# Patient Record
Sex: MALE | Race: WHITE | NOT HISPANIC OR LATINO | Employment: UNEMPLOYED | URBAN - METROPOLITAN AREA
[De-identification: names, ages, dates, MRNs, and addresses within clinical notes are randomized per-mention and may not be internally consistent; named-entity substitution may affect disease eponyms.]

---

## 2019-03-25 ENCOUNTER — APPOINTMENT (EMERGENCY)
Dept: CT IMAGING | Facility: HOSPITAL | Age: 49
End: 2019-03-25
Payer: OTHER GOVERNMENT

## 2019-03-25 ENCOUNTER — HOSPITAL ENCOUNTER (EMERGENCY)
Facility: HOSPITAL | Age: 49
Discharge: OTHER INSTITUTION | End: 2019-03-25
Attending: EMERGENCY MEDICINE | Admitting: EMERGENCY MEDICINE
Payer: OTHER GOVERNMENT

## 2019-03-25 ENCOUNTER — APPOINTMENT (EMERGENCY)
Dept: RADIOLOGY | Facility: HOSPITAL | Age: 49
End: 2019-03-25
Payer: OTHER GOVERNMENT

## 2019-03-25 ENCOUNTER — HOSPITAL ENCOUNTER (INPATIENT)
Facility: HOSPITAL | Age: 49
LOS: 4 days | Discharge: HOME | DRG: 640 | End: 2019-03-29
Attending: INTERNAL MEDICINE | Admitting: INTERNAL MEDICINE
Payer: OTHER GOVERNMENT

## 2019-03-25 VITALS
OXYGEN SATURATION: 95 % | WEIGHT: 200 LBS | SYSTOLIC BLOOD PRESSURE: 102 MMHG | BODY MASS INDEX: 25.67 KG/M2 | TEMPERATURE: 97.9 F | RESPIRATION RATE: 21 BRPM | DIASTOLIC BLOOD PRESSURE: 72 MMHG | HEART RATE: 127 BPM | HEIGHT: 74 IN

## 2019-03-25 DIAGNOSIS — E11.65 TYPE 2 DIABETES MELLITUS WITH HYPERGLYCEMIA, WITHOUT LONG-TERM CURRENT USE OF INSULIN (HCC): ICD-10-CM

## 2019-03-25 DIAGNOSIS — F10.239 ALCOHOL WITHDRAWAL (HCC): ICD-10-CM

## 2019-03-25 DIAGNOSIS — E86.0 DEHYDRATION: ICD-10-CM

## 2019-03-25 DIAGNOSIS — R11.2 NAUSEA & VOMITING: Primary | ICD-10-CM

## 2019-03-25 DIAGNOSIS — E11.10 DIABETIC KETOACIDOSIS WITHOUT COMA ASSOCIATED WITH TYPE 2 DIABETES MELLITUS (HCC): ICD-10-CM

## 2019-03-25 DIAGNOSIS — F10.10 ALCOHOL ABUSE: ICD-10-CM

## 2019-03-25 DIAGNOSIS — N17.9 ACUTE KIDNEY INJURY (HCC): ICD-10-CM

## 2019-03-25 DIAGNOSIS — E87.2 ALCOHOLIC KETOACIDOSIS: ICD-10-CM

## 2019-03-25 DIAGNOSIS — R73.9 HYPERGLYCEMIA: ICD-10-CM

## 2019-03-25 DIAGNOSIS — K92.0 GASTROINTESTINAL HEMORRHAGE WITH HEMATEMESIS: Primary | ICD-10-CM

## 2019-03-25 PROBLEM — E87.29 ALCOHOLIC KETOACIDOSIS: Status: ACTIVE | Noted: 2019-03-25

## 2019-03-25 LAB
ALBUMIN SERPL BCP-MCNC: 3.2 G/DL (ref 3.5–5.7)
ALP SERPL-CCNC: 329 U/L (ref 40–150)
ALT SERPL W P-5'-P-CCNC: 35 U/L (ref 7–52)
ANION GAP SERPL CALCULATED.3IONS-SCNC: 23 MMOL/L (ref 4–13)
ANION GAP SERPL CALCULATED.3IONS-SCNC: 24 MMOL/L (ref 4–13)
ANION GAP SERPL CALCULATED.3IONS-SCNC: 28 MMOL/L (ref 4–13)
APTT PPP: 43 SECONDS (ref 26–38)
ARTERIAL PATENCY WRIST A: YES
AST SERPL W P-5'-P-CCNC: 93 U/L (ref 13–39)
BASE EXCESS BLDA CALC-SCNC: -0.5 MMOL/L
BASOPHILS # BLD AUTO: 0.1 THOUSANDS/ΜL (ref 0–0.1)
BASOPHILS NFR BLD AUTO: 1 % (ref 0–2)
BETA-HYDROXYBUTYRATE: 2.53 MMOL/L (ref 0.02–0.27)
BILIRUB SERPL-MCNC: 1.6 MG/DL (ref 0.2–1)
BUN SERPL-MCNC: 6 MG/DL (ref 7–25)
BUN SERPL-MCNC: 6 MG/DL (ref 7–25)
BUN SERPL-MCNC: 7 MG/DL (ref 5–25)
CALCIUM SERPL-MCNC: 8.8 MG/DL (ref 8.3–10.1)
CALCIUM SERPL-MCNC: 8.9 MG/DL (ref 8.6–10.5)
CALCIUM SERPL-MCNC: 9.9 MG/DL (ref 8.6–10.5)
CHLORIDE SERPL-SCNC: 84 MMOL/L (ref 98–107)
CHLORIDE SERPL-SCNC: 88 MMOL/L (ref 98–107)
CHLORIDE SERPL-SCNC: 93 MMOL/L (ref 100–108)
CO2 SERPL-SCNC: 20 MMOL/L (ref 21–32)
CO2 SERPL-SCNC: 22 MMOL/L (ref 21–31)
CO2 SERPL-SCNC: 23 MMOL/L (ref 21–31)
CREAT SERPL-MCNC: 1.15 MG/DL (ref 0.7–1.3)
CREAT SERPL-MCNC: 1.3 MG/DL (ref 0.6–1.3)
CREAT SERPL-MCNC: 1.33 MG/DL (ref 0.7–1.3)
DACRYOCYTES BLD QL SMEAR: PRESENT
EOSINOPHIL # BLD AUTO: 0 THOUSAND/ΜL (ref 0–0.61)
EOSINOPHIL NFR BLD AUTO: 0 % (ref 0–5)
ERYTHROCYTE [DISTWIDTH] IN BLOOD BY AUTOMATED COUNT: 13.3 % (ref 11.5–14.5)
ERYTHROCYTE [DISTWIDTH] IN BLOOD BY AUTOMATED COUNT: 13.3 % (ref 11.6–15.1)
ETHANOL SERPL-MCNC: 74.4 MG/DL
GFR SERPL CREATININE-BSD FRML MDRD: 62 ML/MIN/1.73SQ M
GFR SERPL CREATININE-BSD FRML MDRD: 64 ML/MIN/1.73SQ M
GFR SERPL CREATININE-BSD FRML MDRD: 74 ML/MIN/1.73SQ M
GIANT PLATELETS BLD QL SMEAR: PRESENT
GLUCOSE SERPL-MCNC: 304 MG/DL (ref 65–99)
GLUCOSE SERPL-MCNC: 424 MG/DL (ref 65–99)
GLUCOSE SERPL-MCNC: 437 MG/DL (ref 65–140)
GLUCOSE SERPL-MCNC: 447 MG/DL (ref 65–140)
GLUCOSE SERPL-MCNC: 467 MG/DL (ref 65–140)
GLUCOSE SERPL-MCNC: 475 MG/DL (ref 65–140)
HCO3 BLDA-SCNC: 21.6 MMOL/L (ref 22–28)
HCT VFR BLD AUTO: 41.2 % (ref 36.5–49.3)
HCT VFR BLD AUTO: 45.3 % (ref 42–47)
HGB BLD-MCNC: 14.3 G/DL (ref 12–17)
HGB BLD-MCNC: 15.7 G/DL (ref 14–18)
INR PPP: 1.37 (ref 0.9–1.5)
LIPASE SERPL-CCNC: 12 U/L (ref 11–82)
LYMPHOCYTES # BLD AUTO: 1.2 THOUSANDS/ΜL (ref 0.6–4.47)
LYMPHOCYTES NFR BLD AUTO: 22 % (ref 21–51)
MACROCYTES BLD QL AUTO: PRESENT
MAGNESIUM SERPL-MCNC: 1.6 MG/DL (ref 1.9–2.7)
MAGNESIUM SERPL-MCNC: 2 MG/DL (ref 1.6–2.6)
MCH RBC QN AUTO: 35.3 PG (ref 26.8–34.3)
MCH RBC QN AUTO: 35.3 PG (ref 26–34)
MCHC RBC AUTO-ENTMCNC: 34.5 G/DL (ref 31–37)
MCHC RBC AUTO-ENTMCNC: 34.7 G/DL (ref 31.4–37.4)
MCV RBC AUTO: 102 FL (ref 81–99)
MCV RBC AUTO: 102 FL (ref 82–98)
MONOCYTES # BLD AUTO: 0.7 THOUSAND/ΜL (ref 0.17–1.22)
MONOCYTES NFR BLD AUTO: 12 % (ref 2–12)
NEUTROPHILS # BLD AUTO: 3.6 THOUSANDS/ΜL (ref 1.4–6.5)
NEUTS SEG NFR BLD AUTO: 65 % (ref 42–75)
NON VENT ROOM AIR: 21 %
O2 CT BLDA-SCNC: 19.7 ML/DL (ref 16–23)
OXYHGB MFR BLDA: 94.3 % (ref 94–97)
PCO2 BLDA: 29 MM HG (ref 36–44)
PH BLDA: 7.49 [PH] (ref 7.35–7.45)
PH BLDV: 7.55 [PH] (ref 7.3–7.4)
PLATELET # BLD AUTO: 167 THOUSANDS/UL (ref 149–390)
PLATELET # BLD AUTO: 180 THOUSANDS/UL (ref 149–390)
PLATELET BLD QL SMEAR: ADEQUATE
PMV BLD AUTO: 11.5 FL (ref 8.9–12.7)
PMV BLD AUTO: 9.4 FL (ref 8.6–11.7)
PO2 BLDA: 80 MM HG (ref 75–129)
POLYCHROMASIA BLD QL SMEAR: PRESENT
POTASSIUM SERPL-SCNC: 3.6 MMOL/L (ref 3.5–5.5)
POTASSIUM SERPL-SCNC: 3.8 MMOL/L (ref 3.5–5.3)
POTASSIUM SERPL-SCNC: 3.8 MMOL/L (ref 3.5–5.5)
PROT SERPL-MCNC: 7 G/DL (ref 6.4–8.9)
PROTHROMBIN TIME: 16 SECONDS (ref 10.2–13)
RBC # BLD AUTO: 4.05 MILLION/UL (ref 3.88–5.62)
RBC # BLD AUTO: 4.43 MILLION/UL (ref 4.3–5.9)
RBC MORPH BLD: NORMAL
SODIUM SERPL-SCNC: 134 MMOL/L (ref 134–143)
SODIUM SERPL-SCNC: 135 MMOL/L (ref 134–143)
SODIUM SERPL-SCNC: 136 MMOL/L (ref 136–145)
SPECIMEN SOURCE: ABNORMAL
WBC # BLD AUTO: 5.5 THOUSAND/UL (ref 4.8–10.8)
WBC # BLD AUTO: 8.22 THOUSAND/UL (ref 4.31–10.16)

## 2019-03-25 PROCEDURE — 99221 1ST HOSP IP/OBS SF/LOW 40: CPT | Performed by: NURSE PRACTITIONER

## 2019-03-25 PROCEDURE — 83735 ASSAY OF MAGNESIUM: CPT | Performed by: NURSE PRACTITIONER

## 2019-03-25 PROCEDURE — 36600 WITHDRAWAL OF ARTERIAL BLOOD: CPT

## 2019-03-25 PROCEDURE — 96365 THER/PROPH/DIAG IV INF INIT: CPT

## 2019-03-25 PROCEDURE — 93005 ELECTROCARDIOGRAM TRACING: CPT

## 2019-03-25 PROCEDURE — 80048 BASIC METABOLIC PNL TOTAL CA: CPT | Performed by: EMERGENCY MEDICINE

## 2019-03-25 PROCEDURE — 83690 ASSAY OF LIPASE: CPT | Performed by: EMERGENCY MEDICINE

## 2019-03-25 PROCEDURE — 83735 ASSAY OF MAGNESIUM: CPT | Performed by: EMERGENCY MEDICINE

## 2019-03-25 PROCEDURE — 70450 CT HEAD/BRAIN W/O DYE: CPT

## 2019-03-25 PROCEDURE — 82010 KETONE BODYS QUAN: CPT | Performed by: EMERGENCY MEDICINE

## 2019-03-25 PROCEDURE — 96375 TX/PRO/DX INJ NEW DRUG ADDON: CPT

## 2019-03-25 PROCEDURE — 96376 TX/PRO/DX INJ SAME DRUG ADON: CPT

## 2019-03-25 PROCEDURE — 99285 EMERGENCY DEPT VISIT HI MDM: CPT

## 2019-03-25 PROCEDURE — 85610 PROTHROMBIN TIME: CPT | Performed by: EMERGENCY MEDICINE

## 2019-03-25 PROCEDURE — 85027 COMPLETE CBC AUTOMATED: CPT | Performed by: NURSE PRACTITIONER

## 2019-03-25 PROCEDURE — 82805 BLOOD GASES W/O2 SATURATION: CPT | Performed by: NURSE PRACTITIONER

## 2019-03-25 PROCEDURE — 96366 THER/PROPH/DIAG IV INF ADDON: CPT

## 2019-03-25 PROCEDURE — 80048 BASIC METABOLIC PNL TOTAL CA: CPT | Performed by: NURSE PRACTITIONER

## 2019-03-25 PROCEDURE — 80320 DRUG SCREEN QUANTALCOHOLS: CPT | Performed by: EMERGENCY MEDICINE

## 2019-03-25 PROCEDURE — 82948 REAGENT STRIP/BLOOD GLUCOSE: CPT

## 2019-03-25 PROCEDURE — 71045 X-RAY EXAM CHEST 1 VIEW: CPT

## 2019-03-25 PROCEDURE — 96361 HYDRATE IV INFUSION ADD-ON: CPT

## 2019-03-25 PROCEDURE — 36415 COLL VENOUS BLD VENIPUNCTURE: CPT | Performed by: EMERGENCY MEDICINE

## 2019-03-25 PROCEDURE — 85025 COMPLETE CBC W/AUTO DIFF WBC: CPT | Performed by: EMERGENCY MEDICINE

## 2019-03-25 PROCEDURE — 82800 BLOOD PH: CPT | Performed by: EMERGENCY MEDICINE

## 2019-03-25 PROCEDURE — 85730 THROMBOPLASTIN TIME PARTIAL: CPT | Performed by: EMERGENCY MEDICINE

## 2019-03-25 PROCEDURE — 80053 COMPREHEN METABOLIC PANEL: CPT | Performed by: EMERGENCY MEDICINE

## 2019-03-25 RX ORDER — CALCIUM CARBONATE 500(1250)
1 TABLET ORAL 2 TIMES DAILY WITH MEALS
Status: DISCONTINUED | OUTPATIENT
Start: 2019-03-26 | End: 2019-03-29 | Stop reason: HOSPADM

## 2019-03-25 RX ORDER — LEVETIRACETAM 500 MG/1
500 TABLET ORAL EVERY 12 HOURS SCHEDULED
COMMUNITY

## 2019-03-25 RX ORDER — ATORVASTATIN CALCIUM 20 MG/1
20 TABLET, FILM COATED ORAL
Status: DISCONTINUED | OUTPATIENT
Start: 2019-03-26 | End: 2019-03-29 | Stop reason: HOSPADM

## 2019-03-25 RX ORDER — UBIDECARENONE 75 MG
100 CAPSULE ORAL DAILY
Status: DISCONTINUED | OUTPATIENT
Start: 2019-03-26 | End: 2019-03-29 | Stop reason: HOSPADM

## 2019-03-25 RX ORDER — SODIUM CHLORIDE 9 MG/ML
125 INJECTION, SOLUTION INTRAVENOUS CONTINUOUS
Status: DISCONTINUED | OUTPATIENT
Start: 2019-03-25 | End: 2019-03-25 | Stop reason: HOSPADM

## 2019-03-25 RX ORDER — MULTIVIT-MIN/IRON FUM/FOLIC AC 7.5 MG-4
1 TABLET ORAL DAILY
COMMUNITY

## 2019-03-25 RX ORDER — HYDROMORPHONE HCL/PF 1 MG/ML
0.5 SYRINGE (ML) INJECTION EVERY 6 HOURS PRN
Status: DISCONTINUED | OUTPATIENT
Start: 2019-03-25 | End: 2019-03-26

## 2019-03-25 RX ORDER — CALCIUM CARBONATE/VITAMIN D3 500-10/5ML
400 LIQUID (ML) ORAL 2 TIMES DAILY
COMMUNITY

## 2019-03-25 RX ORDER — LEVETIRACETAM 500 MG/1
500 TABLET ORAL EVERY 12 HOURS SCHEDULED
Status: DISCONTINUED | OUTPATIENT
Start: 2019-03-25 | End: 2019-03-29 | Stop reason: HOSPADM

## 2019-03-25 RX ORDER — FERROUS SULFATE 325(65) MG
325 TABLET ORAL
COMMUNITY

## 2019-03-25 RX ORDER — FOLIC ACID 1 MG/1
1 TABLET ORAL DAILY
COMMUNITY

## 2019-03-25 RX ORDER — SODIUM CHLORIDE, SODIUM LACTATE, POTASSIUM CHLORIDE, CALCIUM CHLORIDE 600; 310; 30; 20 MG/100ML; MG/100ML; MG/100ML; MG/100ML
100 INJECTION, SOLUTION INTRAVENOUS CONTINUOUS
Status: DISCONTINUED | OUTPATIENT
Start: 2019-03-25 | End: 2019-03-26

## 2019-03-25 RX ORDER — THIAMINE MONONITRATE (VIT B1) 100 MG
100 TABLET ORAL ONCE
Status: COMPLETED | OUTPATIENT
Start: 2019-03-25 | End: 2019-03-25

## 2019-03-25 RX ORDER — PHENOL 1.4 %
650 AEROSOL, SPRAY (ML) MUCOUS MEMBRANE 2 TIMES DAILY WITH MEALS
COMMUNITY

## 2019-03-25 RX ORDER — MIRTAZAPINE 30 MG/1
45 TABLET, FILM COATED ORAL
COMMUNITY

## 2019-03-25 RX ORDER — ERGOCALCIFEROL (VITAMIN D2) 1250 MCG
50000 CAPSULE ORAL WEEKLY
COMMUNITY

## 2019-03-25 RX ORDER — FOLIC ACID 1 MG/1
1 TABLET ORAL ONCE
Status: COMPLETED | OUTPATIENT
Start: 2019-03-25 | End: 2019-03-25

## 2019-03-25 RX ORDER — ERGOCALCIFEROL 1.25 MG/1
50000 CAPSULE ORAL WEEKLY
Status: DISCONTINUED | OUTPATIENT
Start: 2019-03-26 | End: 2019-03-29 | Stop reason: HOSPADM

## 2019-03-25 RX ORDER — PANTOPRAZOLE SODIUM 20 MG/1
20 TABLET, DELAYED RELEASE ORAL
Status: DISCONTINUED | OUTPATIENT
Start: 2019-03-26 | End: 2019-03-26

## 2019-03-25 RX ORDER — DULOXETIN HYDROCHLORIDE 20 MG/1
40 CAPSULE, DELAYED RELEASE ORAL 2 TIMES DAILY
Status: ON HOLD | COMMUNITY
End: 2020-06-27

## 2019-03-25 RX ORDER — ONDANSETRON 2 MG/ML
4 INJECTION INTRAMUSCULAR; INTRAVENOUS ONCE
Status: COMPLETED | OUTPATIENT
Start: 2019-03-25 | End: 2019-03-25

## 2019-03-25 RX ORDER — ATORVASTATIN CALCIUM 10 MG/1
40 TABLET, FILM COATED ORAL DAILY
COMMUNITY

## 2019-03-25 RX ORDER — TOPIRAMATE 50 MG/1
50 TABLET, FILM COATED ORAL DAILY
Status: ON HOLD | COMMUNITY
End: 2020-06-27

## 2019-03-25 RX ORDER — OMEPRAZOLE 20 MG/1
40 CAPSULE, DELAYED RELEASE ORAL DAILY
COMMUNITY
End: 2019-03-29 | Stop reason: HOSPADM

## 2019-03-25 RX ORDER — CHOLESTYRAMINE LIGHT 4 G/5.7G
4 POWDER, FOR SUSPENSION ORAL 3 TIMES DAILY
Status: DISCONTINUED | OUTPATIENT
Start: 2019-03-25 | End: 2019-03-29 | Stop reason: HOSPADM

## 2019-03-25 RX ORDER — THIAMINE MONONITRATE (VIT B1) 100 MG
100 TABLET ORAL DAILY
COMMUNITY

## 2019-03-25 RX ORDER — SERTRALINE HYDROCHLORIDE 100 MG/1
100 TABLET, FILM COATED ORAL 2 TIMES DAILY
Status: DISCONTINUED | OUTPATIENT
Start: 2019-03-25 | End: 2019-03-29 | Stop reason: HOSPADM

## 2019-03-25 RX ORDER — GEMFIBROZIL 600 MG/1
600 TABLET, FILM COATED ORAL DAILY
COMMUNITY

## 2019-03-25 RX ORDER — SERTRALINE HYDROCHLORIDE 100 MG/1
100 TABLET, FILM COATED ORAL 2 TIMES DAILY
COMMUNITY

## 2019-03-25 RX ORDER — ONDANSETRON 2 MG/ML
1 INJECTION INTRAMUSCULAR; INTRAVENOUS ONCE
Status: COMPLETED | OUTPATIENT
Start: 2019-03-25 | End: 2019-03-25

## 2019-03-25 RX ORDER — ASPIRIN 81 MG/1
81 TABLET ORAL DAILY
COMMUNITY

## 2019-03-25 RX ORDER — GEMFIBROZIL 600 MG/1
600 TABLET, FILM COATED ORAL DAILY
Status: DISCONTINUED | OUTPATIENT
Start: 2019-03-26 | End: 2019-03-25

## 2019-03-25 RX ORDER — DULOXETIN HYDROCHLORIDE 20 MG/1
40 CAPSULE, DELAYED RELEASE ORAL 2 TIMES DAILY
Status: DISCONTINUED | OUTPATIENT
Start: 2019-03-25 | End: 2019-03-29 | Stop reason: HOSPADM

## 2019-03-25 RX ORDER — LORAZEPAM 2 MG/ML
2 INJECTION INTRAMUSCULAR ONCE
Status: COMPLETED | OUTPATIENT
Start: 2019-03-25 | End: 2019-03-25

## 2019-03-25 RX ORDER — LORAZEPAM 2 MG/ML
2 INJECTION INTRAMUSCULAR EVERY 4 HOURS PRN
Status: DISCONTINUED | OUTPATIENT
Start: 2019-03-25 | End: 2019-03-29 | Stop reason: HOSPADM

## 2019-03-25 RX ORDER — MAGNESIUM SULFATE HEPTAHYDRATE 40 MG/ML
2 INJECTION, SOLUTION INTRAVENOUS ONCE
Status: COMPLETED | OUTPATIENT
Start: 2019-03-25 | End: 2019-03-25

## 2019-03-25 RX ORDER — FOLIC ACID 1 MG/1
1 TABLET ORAL DAILY
Status: DISCONTINUED | OUTPATIENT
Start: 2019-03-26 | End: 2019-03-29 | Stop reason: HOSPADM

## 2019-03-25 RX ORDER — TOPIRAMATE 25 MG/1
50 TABLET ORAL DAILY
Status: DISCONTINUED | OUTPATIENT
Start: 2019-03-26 | End: 2019-03-29 | Stop reason: HOSPADM

## 2019-03-25 RX ORDER — FERROUS SULFATE 325(65) MG
325 TABLET ORAL
Status: DISCONTINUED | OUTPATIENT
Start: 2019-03-26 | End: 2019-03-29 | Stop reason: HOSPADM

## 2019-03-25 RX ADMIN — SODIUM CHLORIDE 125 ML/HR: 0.9 INJECTION, SOLUTION INTRAVENOUS at 19:28

## 2019-03-25 RX ADMIN — SODIUM CHLORIDE 10 UNITS/HR: 9 INJECTION, SOLUTION INTRAVENOUS at 22:54

## 2019-03-25 RX ADMIN — ONDANSETRON 4 MG: 2 INJECTION INTRAMUSCULAR; INTRAVENOUS at 19:29

## 2019-03-25 RX ADMIN — SODIUM CHLORIDE 1000 ML: 0.9 INJECTION, SOLUTION INTRAVENOUS at 15:49

## 2019-03-25 RX ADMIN — MAGNESIUM SULFATE IN WATER 2 G: 40 INJECTION, SOLUTION INTRAVENOUS at 16:34

## 2019-03-25 RX ADMIN — HYDROMORPHONE HYDROCHLORIDE 0.5 MG: 1 INJECTION, SOLUTION INTRAMUSCULAR; INTRAVENOUS; SUBCUTANEOUS at 21:57

## 2019-03-25 RX ADMIN — FOLIC ACID 1 MG: 1 TABLET ORAL at 15:54

## 2019-03-25 RX ADMIN — Medication 100 MG: at 15:54

## 2019-03-25 RX ADMIN — LORAZEPAM 2 MG: 2 INJECTION INTRAMUSCULAR; INTRAVENOUS at 17:34

## 2019-03-25 RX ADMIN — SODIUM CHLORIDE, SODIUM LACTATE, POTASSIUM CHLORIDE, AND CALCIUM CHLORIDE 200 ML/HR: .6; .31; .03; .02 INJECTION, SOLUTION INTRAVENOUS at 21:35

## 2019-03-25 RX ADMIN — SODIUM CHLORIDE 1000 ML: 0.9 INJECTION, SOLUTION INTRAVENOUS at 16:49

## 2019-03-25 RX ADMIN — ONDANSETRON 4 MG: 2 INJECTION INTRAMUSCULAR; INTRAVENOUS at 15:52

## 2019-03-25 RX ADMIN — SODIUM CHLORIDE 125 ML/HR: 0.9 INJECTION, SOLUTION INTRAVENOUS at 19:34

## 2019-03-25 NOTE — ED PROVIDER NOTES
History  Chief Complaint   Patient presents with    Vomiting     Patient is a 50-year-old male with history of alcohol abuse presents the emergency department with 2 days of worsening nausea and vomiting denies any abdominal pain he also reports having occasional falls over the past 2 days and is unsure if he hit his head denies any pain or acute traumatic injury  Patient is also concerned as he isn't unable to hold down anything today and including alcohol  History provided by:  Patient  Vomiting   Severity:  Moderate  Duration:  2 days  Timing:  Constant  Quality:  Stomach contents  Progression:  Worsening  Chronicity:  Recurrent      Prior to Admission Medications   Prescriptions Last Dose Informant Patient Reported? Taking?    COLESTIPOL HCL PO 3/25/2019 at Unknown time  Yes Yes   Sig: Take 1 g by mouth 2 (two) times a day   DULoxetine (CYMBALTA) 20 mg capsule 3/25/2019 at Unknown time  Yes Yes   Sig: Take 40 mg by mouth 2 (two) times a day   Empagliflozin 25 MG TABS 3/25/2019 at Unknown time  Yes Yes   Sig: Take 25 mg by mouth every morning   Magnesium Oxide 400 MG CAPS 3/25/2019 at Unknown time  Yes Yes   Sig: Take 400 mg by mouth 2 (two) times a day   Multiple Vitamins-Minerals (MULTIVITAMIN WITH MINERALS) tablet 3/25/2019 at Unknown time  Yes Yes   Sig: Take 1 tablet by mouth daily   aspirin (ECOTRIN LOW STRENGTH) 81 mg EC tablet 3/25/2019 at Unknown time  Yes Yes   Sig: Take 81 mg by mouth daily   atorvastatin (LIPITOR) 10 mg tablet 3/25/2019 at Unknown time  Yes Yes   Sig: Take 20 mg by mouth daily   calcium carbonate (OS-LUIGI) 600 MG tablet 3/25/2019 at Unknown time  Yes Yes   Sig: Take 650 mg by mouth 2 (two) times a day with meals   cyanocobalamin 1000 MCG tablet 3/25/2019 at Unknown time  Yes Yes   Sig: Take 100 mcg by mouth daily   ergocalciferol (ERGOCALCIFEROL) 15692 units capsule 3/25/2019 at Unknown time  Yes Yes   Sig: Take 50,000 Units by mouth once a week   ferrous sulfate 325 (65 Fe) mg tablet 3/25/2019 at Unknown time  Yes Yes   Sig: Take 325 mg by mouth daily with breakfast   folic acid (FOLVITE) 1 mg tablet 3/25/2019 at Unknown time  Yes Yes   Sig: Take 1 mg by mouth daily   gemfibrozil (LOPID) 600 mg tablet 3/25/2019 at Unknown time  Yes Yes   Sig: Take 600 mg by mouth daily   levETIRAcetam (KEPPRA) 500 mg tablet 3/25/2019 at Unknown time  Yes Yes   Sig: Take 500 mg by mouth every 12 (twelve) hours   metFORMIN (GLUCOPHAGE) 1000 MG tablet 3/25/2019 at Unknown time  Yes Yes   Sig: Take 1,000 mg by mouth 2 (two) times a day with meals   metoprolol tartrate (LOPRESSOR) 25 mg tablet 3/25/2019 at Unknown time  Yes Yes   Sig: Take 25 mg by mouth every 12 (twelve) hours   mirtazapine (REMERON) 30 mg tablet 3/25/2019 at Unknown time  Yes Yes   Sig: Take 30 mg by mouth daily at bedtime   omeprazole (PriLOSEC) 20 mg delayed release capsule 3/25/2019 at Unknown time  Yes Yes   Sig: Take 40 mg by mouth daily   sertraline (ZOLOFT) 100 mg tablet 3/25/2019 at Unknown time  Yes Yes   Sig: Take 100 mg by mouth 2 (two) times a day   thiamine (VITAMIN B1) 100 mg tablet 3/25/2019 at Unknown time  Yes Yes   Sig: Take 100 mg by mouth daily   topiramate (TOPAMAX) 50 MG tablet 3/25/2019 at Unknown time  Yes Yes   Sig: Take 50 mg by mouth daily      Facility-Administered Medications: None       History reviewed  No pertinent past medical history  Past Surgical History:   Procedure Laterality Date    ABDOMINAL SURGERY      CARDIAC CATHETERIZATION      PANCREATECTOMY      SPLENECTOMY      TONSILLECTOMY         History reviewed  No pertinent family history  I have reviewed and agree with the history as documented  Social History     Tobacco Use    Smoking status: Never Smoker    Smokeless tobacco: Never Used   Substance Use Topics    Alcohol use:  Yes     Alcohol/week: 9 0 oz     Types: 15 Cans of beer per week     Comment: 15 cans beer daily    Drug use: Never        Review of Systems   Gastrointestinal: Positive for vomiting         Physical Exam  Physical Exam    Vital Signs  ED Triage Vitals [03/25/19 1502]   Temperature Pulse Respirations Blood Pressure SpO2   97 9 °F (36 6 °C) (!) 124 18 103/72 97 %      Temp src Heart Rate Source Patient Position - Orthostatic VS BP Location FiO2 (%)   -- -- Sitting Left arm --      Pain Score       7           Vitals:    03/25/19 1700 03/25/19 1730 03/25/19 1735 03/25/19 1800   BP: 100/68 90/58 99/67 97/70   Pulse: (!) 121 (!) 124 (!) 123 (!) 117   Patient Position - Orthostatic VS:             Visual Acuity      ED Medications  Medications   ondansetron (FOR EMS ONLY) (ZOFRAN) 4 mg/2 mL injection 4 mg (0 mg Does not apply Given to EMS 3/25/19 1626)   sodium chloride 0 9 % bolus 1,000 mL (0 mL Intravenous Stopped 3/25/19 1738)   ondansetron (ZOFRAN) injection 4 mg (4 mg Intravenous Given 3/25/19 1552)   thiamine (VITAMIN B1) tablet 100 mg (100 mg Oral Given 1/37/40 3107)   folic acid (FOLVITE) tablet 1 mg (1 mg Oral Given 3/25/19 1554)   magnesium sulfate 2 g/50 mL IVPB (premix) 2 g (0 g Intravenous Stopped 3/25/19 1818)   sodium chloride 0 9 % bolus 1,000 mL (1,000 mL Intravenous New Bag 3/25/19 1649)   LORazepam (ATIVAN) 2 mg/mL injection 2 mg (2 mg Intravenous Given 3/25/19 1734)       Diagnostic Studies  Results Reviewed     Procedure Component Value Units Date/Time    Basic metabolic panel [[id number]] Collected:  03/25/19 1834    Lab Status:  No result Specimen:  Blood from Arm, Right     Lipase [[id number]]  (Normal) Collected:  03/25/19 1547    Lab Status:  Final result Specimen:  Blood from Arm, Right Updated:  03/25/19 1704     Lipase 12 u/L     Beta Hydroxybutyrate [[id number]]  (Abnormal) Collected:  03/25/19 1547    Lab Status:  Final result Specimen:  Blood from Arm, Right Updated:  03/25/19 1704     BETA-HYDROXYBUTYRATE 2 53 mmol/L     pH, venous [[id number]]  (Abnormal) Collected:  03/25/19 1693    Lab Status:  Final result Specimen:  Blood from Arm, Right Updated: 03/25/19 1656     pH, Hermann 7 550    Magnesium [[id number]]  (Abnormal) Collected:  03/25/19 1547    Lab Status:  Final result Specimen:  Blood from Arm, Right Updated:  03/25/19 1615     Magnesium 1 6 mg/dL     Comprehensive metabolic panel [[id number]]  (Abnormal) Collected:  03/25/19 1547    Lab Status:  Final result Specimen:  Blood from Arm, Right Updated:  03/25/19 1615     Sodium 135 mmol/L      Potassium 3 8 mmol/L      Chloride 84 mmol/L      CO2 23 mmol/L      ANION GAP 28 mmol/L      BUN 6 mg/dL      Creatinine 1 33 mg/dL      Glucose 304 mg/dL      Calcium 9 9 mg/dL      AST 93 U/L      ALT 35 U/L      Alkaline Phosphatase 329 U/L      Total Protein 7 0 g/dL      Albumin 3 2 g/dL      Total Bilirubin 1 60 mg/dL      eGFR 62 ml/min/1 73sq m     Narrative:       National Kidney Disease Education Program recommendations are as follows:  GFR calculation is accurate only with a steady state creatinine  Chronic Kidney disease less than 60 ml/min/1 73 sq  meters  Kidney failure less than 15 ml/min/1 73 sq  meters      Ethanol [[id number]]  (Abnormal) Collected:  03/25/19 1547    Lab Status:  Final result Specimen:  Blood from Arm, Right Updated:  03/25/19 1614     Ethanol Lvl 74 4 mg/dL     Protime-INR [[id number]]  (Abnormal) Collected:  03/25/19 1547    Lab Status:  Final result Specimen:  Blood from Arm, Right Updated:  03/25/19 1608     Protime 16 0 seconds      INR 1 37    APTT [[id number]]  (Abnormal) Collected:  03/25/19 1547    Lab Status:  Final result Specimen:  Blood from Arm, Right Updated:  03/25/19 1608     PTT 43 seconds     CBC and differential [[id number]]  (Abnormal) Collected:  03/25/19 1547    Lab Status:  Final result Specimen:  Blood from Arm, Right Updated:  03/25/19 1602     WBC 5 50 Thousand/uL      RBC 4 43 Million/uL      Hemoglobin 15 7 g/dL      Hematocrit 45 3 %       fL      MCH 35 3 pg      MCHC 34 5 g/dL      RDW 13 3 %      MPV 9 4 fL      Platelets 000 Thousands/uL Neutrophils Relative 65 %      Lymphocytes Relative 22 %      Monocytes Relative 12 %      Eosinophils Relative 0 %      Basophils Relative 1 %      Neutrophils Absolute 3 60 Thousands/µL      Lymphocytes Absolute 1 20 Thousands/µL      Monocytes Absolute 0 70 Thousand/µL      Eosinophils Absolute 0 00 Thousand/µL      Basophils Absolute 0 10 Thousands/µL     UA w Reflex to Microscopic w Reflex to Culture [[id number]]     Lab Status:  No result Specimen:  Urine     Rapid drug screen, urine [[id number]]     Lab Status:  No result Specimen:  Urine                  XR chest 1 view portable   ED Interpretation by Mary Jo Caban DO (03/25 1832)   No acute disease      CT head without contrast   Final Result by Shu Singer MD (03/25 1622)      No acute intracranial abnormality  Workstation performed: [id number]                    Procedures  ECG 12 Lead Documentation  Date/Time: 3/25/2019 4:14 PM  Performed by: Mary Jo Caban DO  Authorized by: Mary Jo Caban DO     ECG reviewed by me, the ED Provider: yes    Patient location:  ED  Previous ECG:     Comparison to cardiac monitor: Yes    Quality:     Tracing quality:  Limited by artifact  Rate:     ECG rate:  130    ECG rate assessment: tachycardic    Rhythm:     Rhythm: sinus tachycardia    QRS:     QRS axis:  Right  Conduction:     Conduction normal: Low voltage  ST segments:     ST segments:  Normal  T waves:     T waves: normal             Phone Contacts  ED Phone Contact    ED Course      Patient treated with IV fluids and antiemetics on arrival to the emergency department labs are reviewed and reveal dehydration acute kidney injury and diabetes with hyperglycemia and ketosis with an elevated anion gap however pH is 7 5 and bicarb is 23  Electrolytes replaced intravenously patient was also treated with thiamine and folate given history of alcohol abuse will referred to the hospitalist for further evaluation monitoring and treatment        0245 - spoke with Dr Charles Anthony hospitalist on-call reviewed case and findings in the emergency department  My recommendation was that the patient has dehydration and ketosis and hyperglycemia and needs management with IV fluids he however reviewed the level of ketosis and recommends the patient must be placed on the DKA protocol and transferred to critical care as there is no critical care bed available with a monitor at this hospital        Patient is developing some symptoms of withdrawal with persistent tachycardia and stating he feels very shaky will treat with benzos and given this finding and high probability of alcohol withdrawal in the hospital the patient will require high level or step-down bed  6:35 PM  Spoke with Dr Milady burton intensivist on-call  Reviewed case and findings in the emergency department he accepts for transfer advises just repeating BMP obtaining chest x-ray continue with fluids and holding off on insulin drip at this time                        MDM  Number of Diagnoses or Management Options  Acute kidney injury [facility]): new and requires workup  Alcohol abuse: new and requires workup  Alcohol withdrawal ([facility] [address] ): new and requires workup  Dehydration: new and requires workup  Diabetic ketoacidosis without coma associated with type 2 diabetes mellitus ([facility] [address] ): new and requires workup  Hyperglycemia: new and requires workup  Nausea & vomiting: new and requires workup     Amount and/or Complexity of Data Reviewed  Clinical lab tests: ordered and reviewed  Tests in the radiology section of CPT®: ordered and reviewed  Tests in the medicine section of CPT®: ordered and reviewed  Decide to obtain previous medical records or to obtain history from someone other than the patient: yes  Review and summarize past medical records: yes  Independent visualization of images, tracings, or specimens: yes    Risk of Complications, Morbidity, and/or Mortality  Presenting problems: moderate  Diagnostic procedures: moderate  Management options: moderate    Patient Progress  Patient progress: stable      Disposition  Final diagnoses:   Nausea & vomiting   Dehydration   Acute kidney injury ([facility] [address] )   Alcohol abuse - Chronic   Hyperglycemia   Diabetic ketoacidosis without coma associated with type 2 diabetes mellitus ([facility] [address] ) - Possible without acidosis   Alcohol withdrawal ([facility] [address] )     Time reflects when diagnosis was documented in both MDM as applicable and the Disposition within this note     Time User Action Codes Description Comment    3/25/2019  4:31 PM Brielle Saris Add [R11 2] Nausea & vomiting     3/25/2019  4:31 PM Brielle Saris Add [E86 0] Dehydration     3/25/2019  4:31 PM Remaley, Priscella Maxcy Add [N17 9] Acute kidney injury ([facility] [address] )     3/25/2019  4:31 PM Brielle Saris Add [F10 10] Alcohol abuse     3/25/2019  4:31 PM Brielle Saris Modify [F10 10] Alcohol abuse Chronic    3/25/2019  4:32 PM Brielle Saris Add [R73 9] Hyperglycemia     3/25/2019  5:11 PM Remaley, Priscella Maxcy Add [E11 10] Diabetic ketoacidosis without coma associated with type 2 diabetes mellitus ([facility] [address] )     3/25/2019  5:11 PM Brielle Saris Modify [E11 10] Diabetic ketoacidosis without coma associated with type 2 diabetes mellitus ([facility] [address] ) Possible without acidosis    3/25/2019  6:03 PM Brielle Saris Add [F10 239] Alcohol withdrawal [facility])       ED Disposition     ED Disposition Condition Date/Time Comment    Transfer to Another [facility] Mar 25, 2019  5:36 PM Transferred to [address]  Follow-up Information    None         Patient's Medications   Discharge Prescriptions    No medications on file     No discharge procedures on file      ED Provider  Electronically Signed by               Blair Marrero DO  03/25/19 7852

## 2019-03-25 NOTE — EMTALA/ACUTE CARE TRANSFER
[address] [facility]  [address] [facility] [address]  [address] [facility] [address] [phone]  Dept: [phone]      [address] TRANSFER CONSENT    NAME Orion Kerr 1970                              MRN [id number]    I have been informed of my rights regarding examination, treatment, and transfer   by Dr René Waldrop DO    Benefits: Specialized equipment and/or services available at the receiving facility (Include comment)________________________(Cardiac monitor and ICU bed)    Risks: Potential for delay in receiving treatment, Potential deterioration of medical condition, Loss of IV, Increased discomfort during transfer, Possible worsening of condition or death during transfer      Consent for Transfer:  I acknowledge that my medical condition has been evaluated and explained to me by the emergency department physician or other qualified medical person and/or my attending physician, who has recommended that I be transferred to the service of  Accepting Physician: Dr Raji Garvin at [address] [place] [address] Name, [address] : [facility]  The above potential benefits of such transfer, the potential risks associated with such transfer, and the probable risks of not being transferred have been explained to me, and I fully understand them  The doctor has explained that, in my case, the benefits of transfer outweigh the risks  I agree to be transferred  I authorize the performance of emergency medical procedures and treatments upon me in both transit and upon arrival at the receiving facility  Additionally, I authorize the release of any and all medical records to the receiving facility and request they be transported with me, if possible  I understand that the safest mode of transportation during a medical emergency is an ambulance and that the Hospital advocates the use of this mode of transport   Risks of traveling to the receiving facility by car, including absence of medical control, life sustaining equipment, such as oxygen, and medical personnel has been explained to me and I fully understand them  (JORGE CORRECT BOX BELOW)  [  ]  I consent to the stated transfer and to be transported by ambulance/helicopter  [  ]  I consent to the stated transfer, but refuse transportation by ambulance and accept full responsibility for my transportation by car  I understand the risks of non-ambulance transfers and I exonerate the Hospital and its staff from any deterioration in my condition that results from this refusal     X___________________________________________    DATE  19  TIME________  Signature of patient or legally responsible individual signing on patient behalf           RELATIONSHIP TO PATIENT_________________________          Provider Certification    NAME Leroy Angeles                                        [facility] 1970                              MRN [id number]    A medical screening exam was performed on the above named patient  Based on the examination:    Condition Necessitating Transfer The primary encounter diagnosis was Nausea & vomiting  Diagnoses of Dehydration, Acute kidney injury ([address] ), Alcohol abuse, Hyperglycemia, Diabetic ketoacidosis without coma associated with type 2 diabetes mellitus ([address] ), and Alcohol withdrawal ([facility] [address] ) were also pertinent to this visit      Patient Condition: The patient has been stabilized such that within reasonable medical probability, no material deterioration of the patient condition or the condition of the unborn child(suhail) is likely to result from the transfer    Reason for Transfer: Level of Care needed not available at this facility(ICU level care)    Transfer Requirements: Facility [facility]   · Space available and qualified personnel available for treatment as acknowledged by    · Agreed to accept transfer and to provide appropriate medical treatment as acknowledged by         Willie  · Appropriate medical records of the examination and treatment of the patient are provided at the time of transfer   [address] [facility] _______  · Transfer will be performed by qualified personnel from    and appropriate transfer equipment as required, including the use of necessary and appropriate life support measures  Provider Certification: I have examined the patient and explained the following risks and benefits of being transferred/refusing transfer to the patient/family:  General risk, such as traffic hazards, adverse weather conditions, rough terrain or turbulence, possible failure of equipment (including vehicle or aircraft), or consequences of actions of persons outside the control of the transport personnel      Based on these reasonable risks and benefits to the patient and/or the unborn child(suhail), and based upon the information available at the time of the patients examination, I certify that the medical benefits reasonably to be expected from the provision of appropriate medical treatments at another medical facility outweigh the increasing risks, if any, to the individuals medical condition, and in the case of labor to the unborn child, from effecting the transfer      X____________________________________________ DATE 03/25/19        TIME_______      ORIGINAL - SEND TO MEDICAL RECORDS   COPY - SEND WITH PATIENT DURING TRANSFER

## 2019-03-26 ENCOUNTER — APPOINTMENT (INPATIENT)
Dept: ULTRASOUND IMAGING | Facility: HOSPITAL | Age: 49
DRG: 640 | End: 2019-03-26
Payer: OTHER GOVERNMENT

## 2019-03-26 PROBLEM — K92.0 GASTROINTESTINAL HEMORRHAGE WITH HEMATEMESIS: Status: ACTIVE | Noted: 2019-03-26

## 2019-03-26 LAB
ANION GAP SERPL CALCULATED.3IONS-SCNC: 9 MMOL/L (ref 4–13)
ATRIAL RATE: 130 BPM
BUN SERPL-MCNC: 6 MG/DL (ref 5–25)
CA-I BLD-SCNC: 1.05 MMOL/L (ref 1.12–1.32)
CALCIUM SERPL-MCNC: 9 MG/DL (ref 8.3–10.1)
CHLORIDE SERPL-SCNC: 100 MMOL/L (ref 100–108)
CO2 SERPL-SCNC: 32 MMOL/L (ref 21–32)
CREAT SERPL-MCNC: 1.2 MG/DL (ref 0.6–1.3)
ERYTHROCYTE [DISTWIDTH] IN BLOOD BY AUTOMATED COUNT: 13.5 % (ref 11.6–15.1)
GFR SERPL CREATININE-BSD FRML MDRD: 71 ML/MIN/1.73SQ M
GLUCOSE SERPL-MCNC: 110 MG/DL (ref 65–140)
GLUCOSE SERPL-MCNC: 116 MG/DL (ref 65–140)
GLUCOSE SERPL-MCNC: 120 MG/DL (ref 65–140)
GLUCOSE SERPL-MCNC: 148 MG/DL (ref 65–140)
GLUCOSE SERPL-MCNC: 161 MG/DL (ref 65–140)
GLUCOSE SERPL-MCNC: 172 MG/DL (ref 65–140)
GLUCOSE SERPL-MCNC: 175 MG/DL (ref 65–140)
GLUCOSE SERPL-MCNC: 177 MG/DL (ref 65–140)
GLUCOSE SERPL-MCNC: 185 MG/DL (ref 65–140)
GLUCOSE SERPL-MCNC: 199 MG/DL (ref 65–140)
GLUCOSE SERPL-MCNC: 242 MG/DL (ref 65–140)
GLUCOSE SERPL-MCNC: 242 MG/DL (ref 65–140)
GLUCOSE SERPL-MCNC: 359 MG/DL (ref 65–140)
HCT VFR BLD AUTO: 40.1 % (ref 36.5–49.3)
HGB BLD-MCNC: 14.1 G/DL (ref 12–17)
MAGNESIUM SERPL-MCNC: 1.9 MG/DL (ref 1.6–2.6)
MCH RBC QN AUTO: 35.7 PG (ref 26.8–34.3)
MCHC RBC AUTO-ENTMCNC: 35.2 G/DL (ref 31.4–37.4)
MCV RBC AUTO: 102 FL (ref 82–98)
PLATELET # BLD AUTO: 153 THOUSANDS/UL (ref 149–390)
PMV BLD AUTO: 10.8 FL (ref 8.9–12.7)
POTASSIUM SERPL-SCNC: 3.1 MMOL/L (ref 3.5–5.3)
QRS AXIS: 66 DEGREES
QRSD INTERVAL: 78 MS
QT INTERVAL: 400 MS
QTC INTERVAL: 588 MS
RBC # BLD AUTO: 3.95 MILLION/UL (ref 3.88–5.62)
SODIUM SERPL-SCNC: 141 MMOL/L (ref 136–145)
T WAVE AXIS: 57 DEGREES
VENTRICULAR RATE: 130 BPM
WBC # BLD AUTO: 8.31 THOUSAND/UL (ref 4.31–10.16)

## 2019-03-26 PROCEDURE — 85027 COMPLETE CBC AUTOMATED: CPT | Performed by: NURSE PRACTITIONER

## 2019-03-26 PROCEDURE — 80048 BASIC METABOLIC PNL TOTAL CA: CPT | Performed by: NURSE PRACTITIONER

## 2019-03-26 PROCEDURE — 82948 REAGENT STRIP/BLOOD GLUCOSE: CPT

## 2019-03-26 PROCEDURE — 83735 ASSAY OF MAGNESIUM: CPT | Performed by: NURSE PRACTITIONER

## 2019-03-26 PROCEDURE — 76700 US EXAM ABDOM COMPLETE: CPT

## 2019-03-26 PROCEDURE — C9113 INJ PANTOPRAZOLE SODIUM, VIA: HCPCS | Performed by: NURSE PRACTITIONER

## 2019-03-26 PROCEDURE — 93010 ELECTROCARDIOGRAM REPORT: CPT | Performed by: INTERNAL MEDICINE

## 2019-03-26 PROCEDURE — 99233 SBSQ HOSP IP/OBS HIGH 50: CPT | Performed by: NURSE PRACTITIONER

## 2019-03-26 PROCEDURE — 82330 ASSAY OF CALCIUM: CPT | Performed by: NURSE PRACTITIONER

## 2019-03-26 RX ORDER — HEPARIN SODIUM 5000 [USP'U]/ML
5000 INJECTION, SOLUTION INTRAVENOUS; SUBCUTANEOUS EVERY 8 HOURS SCHEDULED
Status: DISCONTINUED | OUTPATIENT
Start: 2019-03-26 | End: 2019-03-29 | Stop reason: HOSPADM

## 2019-03-26 RX ORDER — POTASSIUM CHLORIDE 14.9 MG/ML
20 INJECTION INTRAVENOUS ONCE
Status: COMPLETED | OUTPATIENT
Start: 2019-03-26 | End: 2019-03-26

## 2019-03-26 RX ORDER — PANTOPRAZOLE SODIUM 40 MG/1
40 INJECTION, POWDER, FOR SOLUTION INTRAVENOUS EVERY 12 HOURS SCHEDULED
Status: DISCONTINUED | OUTPATIENT
Start: 2019-03-26 | End: 2019-03-26

## 2019-03-26 RX ORDER — PANTOPRAZOLE SODIUM 40 MG/1
40 TABLET, DELAYED RELEASE ORAL
Status: DISCONTINUED | OUTPATIENT
Start: 2019-03-27 | End: 2019-03-29 | Stop reason: HOSPADM

## 2019-03-26 RX ORDER — SODIUM CHLORIDE, SODIUM LACTATE, POTASSIUM CHLORIDE, CALCIUM CHLORIDE 600; 310; 30; 20 MG/100ML; MG/100ML; MG/100ML; MG/100ML
75 INJECTION, SOLUTION INTRAVENOUS CONTINUOUS
Status: DISCONTINUED | OUTPATIENT
Start: 2019-03-26 | End: 2019-03-29 | Stop reason: HOSPADM

## 2019-03-26 RX ORDER — MAGNESIUM SULFATE 1 G/100ML
1 INJECTION INTRAVENOUS ONCE
Status: COMPLETED | OUTPATIENT
Start: 2019-03-26 | End: 2019-03-26

## 2019-03-26 RX ADMIN — VITAMIN B12 0.1 MG ORAL TABLET 100 MCG: 0.1 TABLET ORAL at 08:49

## 2019-03-26 RX ADMIN — HEPARIN SODIUM 5000 UNITS: 5000 INJECTION, SOLUTION INTRAVENOUS; SUBCUTANEOUS at 14:42

## 2019-03-26 RX ADMIN — LEVETIRACETAM 500 MG: 500 TABLET ORAL at 08:49

## 2019-03-26 RX ADMIN — ERGOCALCIFEROL 50000 UNITS: 1.25 CAPSULE ORAL at 08:49

## 2019-03-26 RX ADMIN — THIAMINE HYDROCHLORIDE 100 MG: 100 INJECTION, SOLUTION INTRAMUSCULAR; INTRAVENOUS at 08:57

## 2019-03-26 RX ADMIN — ONDANSETRON 8 MG: 2 INJECTION INTRAMUSCULAR; INTRAVENOUS at 02:08

## 2019-03-26 RX ADMIN — PANTOPRAZOLE SODIUM 40 MG: 40 INJECTION, POWDER, FOR SOLUTION INTRAVENOUS at 05:19

## 2019-03-26 RX ADMIN — SODIUM CHLORIDE, SODIUM LACTATE, POTASSIUM CHLORIDE, AND CALCIUM CHLORIDE 100 ML/HR: .6; .31; .03; .02 INJECTION, SOLUTION INTRAVENOUS at 08:54

## 2019-03-26 RX ADMIN — Medication 1 TABLET: at 17:49

## 2019-03-26 RX ADMIN — HYDROMORPHONE HYDROCHLORIDE 0.5 MG: 1 INJECTION, SOLUTION INTRAMUSCULAR; INTRAVENOUS; SUBCUTANEOUS at 07:24

## 2019-03-26 RX ADMIN — SERTRALINE HYDROCHLORIDE 100 MG: 100 TABLET ORAL at 08:50

## 2019-03-26 RX ADMIN — HYDROMORPHONE HYDROCHLORIDE 0.5 MG: 1 INJECTION, SOLUTION INTRAMUSCULAR; INTRAVENOUS; SUBCUTANEOUS at 14:46

## 2019-03-26 RX ADMIN — MAGNESIUM OXIDE TAB 400 MG (241.3 MG ELEMENTAL MG) 400 MG: 400 (241.3 MG) TAB at 08:49

## 2019-03-26 RX ADMIN — SODIUM CHLORIDE, SODIUM LACTATE, POTASSIUM CHLORIDE, AND CALCIUM CHLORIDE 75 ML/HR: .6; .31; .03; .02 INJECTION, SOLUTION INTRAVENOUS at 22:14

## 2019-03-26 RX ADMIN — SODIUM CHLORIDE 3 UNITS/HR: 9 INJECTION, SOLUTION INTRAVENOUS at 17:54

## 2019-03-26 RX ADMIN — CHOLESTYRAMINE 4 G: 4 POWDER, FOR SUSPENSION ORAL at 22:00

## 2019-03-26 RX ADMIN — SERTRALINE HYDROCHLORIDE 100 MG: 100 TABLET ORAL at 17:49

## 2019-03-26 RX ADMIN — HEPARIN SODIUM 5000 UNITS: 5000 INJECTION, SOLUTION INTRAVENOUS; SUBCUTANEOUS at 22:12

## 2019-03-26 RX ADMIN — MAGNESIUM OXIDE TAB 400 MG (241.3 MG ELEMENTAL MG) 400 MG: 400 (241.3 MG) TAB at 17:48

## 2019-03-26 RX ADMIN — METOPROLOL TARTRATE 25 MG: 25 TABLET, FILM COATED ORAL at 22:19

## 2019-03-26 RX ADMIN — LEVETIRACETAM 500 MG: 500 TABLET ORAL at 22:00

## 2019-03-26 RX ADMIN — METOPROLOL TARTRATE 25 MG: 25 TABLET, FILM COATED ORAL at 08:50

## 2019-03-26 RX ADMIN — SODIUM CHLORIDE, SODIUM LACTATE, POTASSIUM CHLORIDE, AND CALCIUM CHLORIDE 200 ML/HR: .6; .31; .03; .02 INJECTION, SOLUTION INTRAVENOUS at 02:11

## 2019-03-26 RX ADMIN — ATORVASTATIN CALCIUM 20 MG: 20 TABLET, FILM COATED ORAL at 17:48

## 2019-03-26 RX ADMIN — POTASSIUM CHLORIDE 20 MEQ: 200 INJECTION, SOLUTION INTRAVENOUS at 06:34

## 2019-03-26 RX ADMIN — DULOXETINE HYDROCHLORIDE 40 MG: 20 CAPSULE, DELAYED RELEASE ORAL at 17:48

## 2019-03-26 RX ADMIN — HEPARIN SODIUM 5000 UNITS: 5000 INJECTION, SOLUTION INTRAVENOUS; SUBCUTANEOUS at 05:19

## 2019-03-26 RX ADMIN — POTASSIUM CHLORIDE 20 MEQ: 200 INJECTION, SOLUTION INTRAVENOUS at 08:42

## 2019-03-26 RX ADMIN — FOLIC ACID 1 MG: 1 TABLET ORAL at 08:49

## 2019-03-26 RX ADMIN — MAGNESIUM SULFATE HEPTAHYDRATE 1 G: 1 INJECTION, SOLUTION INTRAVENOUS at 07:26

## 2019-03-27 LAB
ALBUMIN SERPL BCP-MCNC: 2 G/DL (ref 3.5–5)
ALP SERPL-CCNC: 310 U/L (ref 46–116)
ALT SERPL W P-5'-P-CCNC: 35 U/L (ref 12–78)
ANION GAP SERPL CALCULATED.3IONS-SCNC: 11 MMOL/L (ref 4–13)
AST SERPL W P-5'-P-CCNC: 100 U/L (ref 5–45)
ATRIAL RATE: 117 BPM
BILIRUB SERPL-MCNC: 2.41 MG/DL (ref 0.2–1)
BUN SERPL-MCNC: 5 MG/DL (ref 5–25)
CALCIUM SERPL-MCNC: 8.3 MG/DL (ref 8.3–10.1)
CHLORIDE SERPL-SCNC: 100 MMOL/L (ref 100–108)
CO2 SERPL-SCNC: 29 MMOL/L (ref 21–32)
CREAT SERPL-MCNC: 0.8 MG/DL (ref 0.6–1.3)
ERYTHROCYTE [DISTWIDTH] IN BLOOD BY AUTOMATED COUNT: 13.2 % (ref 11.6–15.1)
GFR SERPL CREATININE-BSD FRML MDRD: 105 ML/MIN/1.73SQ M
GLUCOSE SERPL-MCNC: 115 MG/DL (ref 65–140)
GLUCOSE SERPL-MCNC: 125 MG/DL (ref 65–140)
GLUCOSE SERPL-MCNC: 141 MG/DL (ref 65–140)
GLUCOSE SERPL-MCNC: 160 MG/DL (ref 65–140)
GLUCOSE SERPL-MCNC: 164 MG/DL (ref 65–140)
GLUCOSE SERPL-MCNC: 170 MG/DL (ref 65–140)
GLUCOSE SERPL-MCNC: 179 MG/DL (ref 65–140)
GLUCOSE SERPL-MCNC: 193 MG/DL (ref 65–140)
GLUCOSE SERPL-MCNC: 195 MG/DL (ref 65–140)
GLUCOSE SERPL-MCNC: 221 MG/DL (ref 65–140)
GLUCOSE SERPL-MCNC: 233 MG/DL (ref 65–140)
GLUCOSE SERPL-MCNC: 250 MG/DL (ref 65–140)
GLUCOSE SERPL-MCNC: 65 MG/DL (ref 65–140)
GLUCOSE SERPL-MCNC: 72 MG/DL (ref 65–140)
HCT VFR BLD AUTO: 37.8 % (ref 36.5–49.3)
HGB BLD-MCNC: 12.8 G/DL (ref 12–17)
MCH RBC QN AUTO: 35.3 PG (ref 26.8–34.3)
MCHC RBC AUTO-ENTMCNC: 33.9 G/DL (ref 31.4–37.4)
MCV RBC AUTO: 104 FL (ref 82–98)
P AXIS: 66 DEGREES
PLATELET # BLD AUTO: 115 THOUSANDS/UL (ref 149–390)
PMV BLD AUTO: 12 FL (ref 8.9–12.7)
POTASSIUM SERPL-SCNC: 2.8 MMOL/L (ref 3.5–5.3)
PR INTERVAL: 175 MS
PROT SERPL-MCNC: 5.8 G/DL (ref 6.4–8.2)
QRS AXIS: 72 DEGREES
QRSD INTERVAL: 83 MS
QT INTERVAL: 292 MS
QTC INTERVAL: 408 MS
RBC # BLD AUTO: 3.63 MILLION/UL (ref 3.88–5.62)
SODIUM SERPL-SCNC: 140 MMOL/L (ref 136–145)
T WAVE AXIS: 270 DEGREES
VENTRICULAR RATE: 117 BPM
WBC # BLD AUTO: 6.71 THOUSAND/UL (ref 4.31–10.16)

## 2019-03-27 PROCEDURE — 85027 COMPLETE CBC AUTOMATED: CPT | Performed by: NURSE PRACTITIONER

## 2019-03-27 PROCEDURE — 97167 OT EVAL HIGH COMPLEX 60 MIN: CPT

## 2019-03-27 PROCEDURE — 80053 COMPREHEN METABOLIC PANEL: CPT | Performed by: NURSE PRACTITIONER

## 2019-03-27 PROCEDURE — 82948 REAGENT STRIP/BLOOD GLUCOSE: CPT

## 2019-03-27 PROCEDURE — 93010 ELECTROCARDIOGRAM REPORT: CPT | Performed by: INTERNAL MEDICINE

## 2019-03-27 PROCEDURE — G8987 SELF CARE CURRENT STATUS: HCPCS

## 2019-03-27 PROCEDURE — G8988 SELF CARE GOAL STATUS: HCPCS

## 2019-03-27 PROCEDURE — 99232 SBSQ HOSP IP/OBS MODERATE 35: CPT | Performed by: PHYSICIAN ASSISTANT

## 2019-03-27 RX ORDER — POTASSIUM CHLORIDE 20 MEQ/1
40 TABLET, EXTENDED RELEASE ORAL 2 TIMES DAILY
Status: COMPLETED | OUTPATIENT
Start: 2019-03-27 | End: 2019-03-28

## 2019-03-27 RX ORDER — TRAMADOL HYDROCHLORIDE 50 MG/1
50 TABLET ORAL EVERY 6 HOURS PRN
Status: DISCONTINUED | OUTPATIENT
Start: 2019-03-27 | End: 2019-03-29 | Stop reason: HOSPADM

## 2019-03-27 RX ORDER — ACETAMINOPHEN 325 MG/1
650 TABLET ORAL 4 TIMES DAILY PRN
Status: DISCONTINUED | OUTPATIENT
Start: 2019-03-27 | End: 2019-03-29 | Stop reason: HOSPADM

## 2019-03-27 RX ADMIN — SERTRALINE HYDROCHLORIDE 100 MG: 100 TABLET ORAL at 08:00

## 2019-03-27 RX ADMIN — DULOXETINE HYDROCHLORIDE 40 MG: 20 CAPSULE, DELAYED RELEASE ORAL at 07:58

## 2019-03-27 RX ADMIN — Medication 1 TABLET: at 17:27

## 2019-03-27 RX ADMIN — MAGNESIUM OXIDE TAB 400 MG (241.3 MG ELEMENTAL MG) 400 MG: 400 (241.3 MG) TAB at 17:27

## 2019-03-27 RX ADMIN — ATORVASTATIN CALCIUM 20 MG: 20 TABLET, FILM COATED ORAL at 17:27

## 2019-03-27 RX ADMIN — HEPARIN SODIUM 5000 UNITS: 5000 INJECTION, SOLUTION INTRAVENOUS; SUBCUTANEOUS at 13:12

## 2019-03-27 RX ADMIN — LEVETIRACETAM 500 MG: 500 TABLET ORAL at 08:00

## 2019-03-27 RX ADMIN — THIAMINE HYDROCHLORIDE 100 MG: 100 INJECTION, SOLUTION INTRAMUSCULAR; INTRAVENOUS at 08:11

## 2019-03-27 RX ADMIN — SODIUM CHLORIDE, SODIUM LACTATE, POTASSIUM CHLORIDE, AND CALCIUM CHLORIDE 75 ML/HR: .6; .31; .03; .02 INJECTION, SOLUTION INTRAVENOUS at 12:11

## 2019-03-27 RX ADMIN — ONDANSETRON 8 MG: 2 INJECTION INTRAMUSCULAR; INTRAVENOUS at 06:17

## 2019-03-27 RX ADMIN — FOLIC ACID 1 MG: 1 TABLET ORAL at 07:58

## 2019-03-27 RX ADMIN — LORAZEPAM 2 MG: 2 INJECTION INTRAMUSCULAR; INTRAVENOUS at 07:53

## 2019-03-27 RX ADMIN — VITAMIN B12 0.1 MG ORAL TABLET 100 MCG: 0.1 TABLET ORAL at 07:58

## 2019-03-27 RX ADMIN — Medication 1 TABLET: at 07:57

## 2019-03-27 RX ADMIN — LEVETIRACETAM 500 MG: 500 TABLET ORAL at 22:39

## 2019-03-27 RX ADMIN — METOPROLOL TARTRATE 25 MG: 25 TABLET, FILM COATED ORAL at 08:00

## 2019-03-27 RX ADMIN — CHOLESTYRAMINE 4 G: 4 POWDER, FOR SUSPENSION ORAL at 22:39

## 2019-03-27 RX ADMIN — PANTOPRAZOLE SODIUM 40 MG: 40 TABLET, DELAYED RELEASE ORAL at 06:05

## 2019-03-27 RX ADMIN — POTASSIUM CHLORIDE 40 MEQ: 1500 TABLET, EXTENDED RELEASE ORAL at 17:27

## 2019-03-27 RX ADMIN — MAGNESIUM OXIDE TAB 400 MG (241.3 MG ELEMENTAL MG) 400 MG: 400 (241.3 MG) TAB at 08:00

## 2019-03-27 RX ADMIN — SERTRALINE HYDROCHLORIDE 100 MG: 100 TABLET ORAL at 17:27

## 2019-03-27 RX ADMIN — CHOLESTYRAMINE 4 G: 4 POWDER, FOR SUSPENSION ORAL at 17:27

## 2019-03-27 RX ADMIN — HEPARIN SODIUM 5000 UNITS: 5000 INJECTION, SOLUTION INTRAVENOUS; SUBCUTANEOUS at 22:39

## 2019-03-27 RX ADMIN — TOPIRAMATE 50 MG: 25 TABLET, FILM COATED ORAL at 08:00

## 2019-03-27 RX ADMIN — DULOXETINE HYDROCHLORIDE 40 MG: 20 CAPSULE, DELAYED RELEASE ORAL at 17:27

## 2019-03-27 RX ADMIN — HEPARIN SODIUM 5000 UNITS: 5000 INJECTION, SOLUTION INTRAVENOUS; SUBCUTANEOUS at 06:05

## 2019-03-27 RX ADMIN — LORAZEPAM 2 MG: 2 INJECTION INTRAMUSCULAR; INTRAVENOUS at 07:54

## 2019-03-27 RX ADMIN — TRAMADOL HYDROCHLORIDE 50 MG: 50 TABLET, COATED ORAL at 22:48

## 2019-03-27 RX ADMIN — POTASSIUM CHLORIDE 40 MEQ: 1500 TABLET, EXTENDED RELEASE ORAL at 12:00

## 2019-03-27 RX ADMIN — FERROUS SULFATE TAB 325 MG (65 MG ELEMENTAL FE) 325 MG: 325 (65 FE) TAB at 07:54

## 2019-03-28 LAB
AFP-TM SERPL-MCNC: 4.4 NG/ML (ref 0.5–8)
ALBUMIN SERPL BCP-MCNC: 2.1 G/DL (ref 3.5–5)
ALP SERPL-CCNC: 321 U/L (ref 46–116)
ALT SERPL W P-5'-P-CCNC: 31 U/L (ref 12–78)
ANION GAP SERPL CALCULATED.3IONS-SCNC: 9 MMOL/L (ref 4–13)
AST SERPL W P-5'-P-CCNC: 74 U/L (ref 5–45)
BASOPHILS # BLD AUTO: 0.07 THOUSANDS/ΜL (ref 0–0.1)
BASOPHILS NFR BLD AUTO: 1 % (ref 0–1)
BILIRUB SERPL-MCNC: 2.28 MG/DL (ref 0.2–1)
BUN SERPL-MCNC: 5 MG/DL (ref 5–25)
CALCIUM SERPL-MCNC: 8.3 MG/DL (ref 8.3–10.1)
CHLORIDE SERPL-SCNC: 99 MMOL/L (ref 100–108)
CO2 SERPL-SCNC: 28 MMOL/L (ref 21–32)
CREAT SERPL-MCNC: 0.84 MG/DL (ref 0.6–1.3)
EOSINOPHIL # BLD AUTO: 0.09 THOUSAND/ΜL (ref 0–0.61)
EOSINOPHIL NFR BLD AUTO: 1 % (ref 0–6)
ERYTHROCYTE [DISTWIDTH] IN BLOOD BY AUTOMATED COUNT: 12.9 % (ref 11.6–15.1)
GFR SERPL CREATININE-BSD FRML MDRD: 103 ML/MIN/1.73SQ M
GLUCOSE SERPL-MCNC: 125 MG/DL (ref 65–140)
GLUCOSE SERPL-MCNC: 145 MG/DL (ref 65–140)
GLUCOSE SERPL-MCNC: 164 MG/DL (ref 65–140)
GLUCOSE SERPL-MCNC: 168 MG/DL (ref 65–140)
GLUCOSE SERPL-MCNC: 186 MG/DL (ref 65–140)
GLUCOSE SERPL-MCNC: 194 MG/DL (ref 65–140)
GLUCOSE SERPL-MCNC: 211 MG/DL (ref 65–140)
GLUCOSE SERPL-MCNC: 281 MG/DL (ref 65–140)
GLUCOSE SERPL-MCNC: 300 MG/DL (ref 65–140)
HCT VFR BLD AUTO: 39.9 % (ref 36.5–49.3)
HGB BLD-MCNC: 13.6 G/DL (ref 12–17)
IMM GRANULOCYTES # BLD AUTO: 0.08 THOUSAND/UL (ref 0–0.2)
IMM GRANULOCYTES NFR BLD AUTO: 1 % (ref 0–2)
LYMPHOCYTES # BLD AUTO: 3.66 THOUSANDS/ΜL (ref 0.6–4.47)
LYMPHOCYTES NFR BLD AUTO: 48 % (ref 14–44)
MCH RBC QN AUTO: 35.6 PG (ref 26.8–34.3)
MCHC RBC AUTO-ENTMCNC: 34.1 G/DL (ref 31.4–37.4)
MCV RBC AUTO: 105 FL (ref 82–98)
MONOCYTES # BLD AUTO: 0.97 THOUSAND/ΜL (ref 0.17–1.22)
MONOCYTES NFR BLD AUTO: 13 % (ref 4–12)
NEUTROPHILS # BLD AUTO: 2.69 THOUSANDS/ΜL (ref 1.85–7.62)
NEUTS SEG NFR BLD AUTO: 36 % (ref 43–75)
NRBC BLD AUTO-RTO: 1 /100 WBCS
PLATELET # BLD AUTO: 110 THOUSANDS/UL (ref 149–390)
PMV BLD AUTO: 11.9 FL (ref 8.9–12.7)
POTASSIUM SERPL-SCNC: 3.2 MMOL/L (ref 3.5–5.3)
PROT SERPL-MCNC: 6.2 G/DL (ref 6.4–8.2)
RBC # BLD AUTO: 3.82 MILLION/UL (ref 3.88–5.62)
SODIUM SERPL-SCNC: 136 MMOL/L (ref 136–145)
WBC # BLD AUTO: 7.56 THOUSAND/UL (ref 4.31–10.16)

## 2019-03-28 PROCEDURE — 99232 SBSQ HOSP IP/OBS MODERATE 35: CPT | Performed by: PHYSICIAN ASSISTANT

## 2019-03-28 PROCEDURE — 82105 ALPHA-FETOPROTEIN SERUM: CPT | Performed by: INTERNAL MEDICINE

## 2019-03-28 PROCEDURE — 82948 REAGENT STRIP/BLOOD GLUCOSE: CPT

## 2019-03-28 PROCEDURE — 80053 COMPREHEN METABOLIC PANEL: CPT | Performed by: PHYSICIAN ASSISTANT

## 2019-03-28 PROCEDURE — 85025 COMPLETE CBC W/AUTO DIFF WBC: CPT | Performed by: PHYSICIAN ASSISTANT

## 2019-03-28 PROCEDURE — 97530 THERAPEUTIC ACTIVITIES: CPT

## 2019-03-28 RX ORDER — INSULIN GLARGINE 100 [IU]/ML
20 INJECTION, SOLUTION SUBCUTANEOUS EVERY 12 HOURS SCHEDULED
Status: DISCONTINUED | OUTPATIENT
Start: 2019-03-28 | End: 2019-03-29 | Stop reason: HOSPADM

## 2019-03-28 RX ADMIN — INSULIN LISPRO 1 UNITS: 100 INJECTION, SOLUTION INTRAVENOUS; SUBCUTANEOUS at 21:49

## 2019-03-28 RX ADMIN — VITAMIN B12 0.1 MG ORAL TABLET 100 MCG: 0.1 TABLET ORAL at 08:28

## 2019-03-28 RX ADMIN — SERTRALINE HYDROCHLORIDE 100 MG: 100 TABLET ORAL at 17:25

## 2019-03-28 RX ADMIN — FOLIC ACID 1 MG: 1 TABLET ORAL at 08:29

## 2019-03-28 RX ADMIN — HEPARIN SODIUM 5000 UNITS: 5000 INJECTION, SOLUTION INTRAVENOUS; SUBCUTANEOUS at 06:08

## 2019-03-28 RX ADMIN — POTASSIUM CHLORIDE 40 MEQ: 1500 TABLET, EXTENDED RELEASE ORAL at 08:25

## 2019-03-28 RX ADMIN — MAGNESIUM OXIDE TAB 400 MG (241.3 MG ELEMENTAL MG) 400 MG: 400 (241.3 MG) TAB at 08:24

## 2019-03-28 RX ADMIN — Medication 1 TABLET: at 17:25

## 2019-03-28 RX ADMIN — THIAMINE HYDROCHLORIDE 100 MG: 100 INJECTION, SOLUTION INTRAMUSCULAR; INTRAVENOUS at 08:30

## 2019-03-28 RX ADMIN — CHOLESTYRAMINE 4 G: 4 POWDER, FOR SUSPENSION ORAL at 08:30

## 2019-03-28 RX ADMIN — HEPARIN SODIUM 5000 UNITS: 5000 INJECTION, SOLUTION INTRAVENOUS; SUBCUTANEOUS at 15:00

## 2019-03-28 RX ADMIN — DULOXETINE HYDROCHLORIDE 40 MG: 20 CAPSULE, DELAYED RELEASE ORAL at 08:24

## 2019-03-28 RX ADMIN — INSULIN LISPRO 2 UNITS: 100 INJECTION, SOLUTION INTRAVENOUS; SUBCUTANEOUS at 17:24

## 2019-03-28 RX ADMIN — PANTOPRAZOLE SODIUM 40 MG: 40 TABLET, DELAYED RELEASE ORAL at 06:08

## 2019-03-28 RX ADMIN — Medication 1 TABLET: at 08:24

## 2019-03-28 RX ADMIN — FERROUS SULFATE TAB 325 MG (65 MG ELEMENTAL FE) 325 MG: 325 (65 FE) TAB at 08:29

## 2019-03-28 RX ADMIN — SODIUM CHLORIDE, SODIUM LACTATE, POTASSIUM CHLORIDE, AND CALCIUM CHLORIDE 75 ML/HR: .6; .31; .03; .02 INJECTION, SOLUTION INTRAVENOUS at 15:36

## 2019-03-28 RX ADMIN — TOPIRAMATE 50 MG: 25 TABLET, FILM COATED ORAL at 08:29

## 2019-03-28 RX ADMIN — INSULIN GLARGINE 20 UNITS: 100 INJECTION, SOLUTION SUBCUTANEOUS at 10:58

## 2019-03-28 RX ADMIN — INSULIN GLARGINE 20 UNITS: 100 INJECTION, SOLUTION SUBCUTANEOUS at 21:48

## 2019-03-28 RX ADMIN — DULOXETINE HYDROCHLORIDE 40 MG: 20 CAPSULE, DELAYED RELEASE ORAL at 17:25

## 2019-03-28 RX ADMIN — HEPARIN SODIUM 5000 UNITS: 5000 INJECTION, SOLUTION INTRAVENOUS; SUBCUTANEOUS at 21:46

## 2019-03-28 RX ADMIN — ATORVASTATIN CALCIUM 20 MG: 20 TABLET, FILM COATED ORAL at 17:25

## 2019-03-28 RX ADMIN — INSULIN LISPRO 4 UNITS: 100 INJECTION, SOLUTION INTRAVENOUS; SUBCUTANEOUS at 12:30

## 2019-03-28 RX ADMIN — LEVETIRACETAM 500 MG: 500 TABLET ORAL at 21:49

## 2019-03-28 RX ADMIN — LEVETIRACETAM 500 MG: 500 TABLET ORAL at 08:29

## 2019-03-28 RX ADMIN — MAGNESIUM OXIDE TAB 400 MG (241.3 MG ELEMENTAL MG) 400 MG: 400 (241.3 MG) TAB at 17:25

## 2019-03-28 RX ADMIN — SERTRALINE HYDROCHLORIDE 100 MG: 100 TABLET ORAL at 08:24

## 2019-03-28 RX ADMIN — CHOLESTYRAMINE 4 G: 4 POWDER, FOR SUSPENSION ORAL at 21:54

## 2019-03-29 VITALS
HEIGHT: 74 IN | BODY MASS INDEX: 23.44 KG/M2 | WEIGHT: 182.6 LBS | TEMPERATURE: 98.2 F | RESPIRATION RATE: 18 BRPM | HEART RATE: 82 BPM | DIASTOLIC BLOOD PRESSURE: 80 MMHG | OXYGEN SATURATION: 97 % | SYSTOLIC BLOOD PRESSURE: 123 MMHG

## 2019-03-29 LAB
ALBUMIN SERPL BCP-MCNC: 2.3 G/DL (ref 3.5–5)
ALP SERPL-CCNC: 317 U/L (ref 46–116)
ALT SERPL W P-5'-P-CCNC: 28 U/L (ref 12–78)
ANION GAP SERPL CALCULATED.3IONS-SCNC: 10 MMOL/L (ref 4–13)
AST SERPL W P-5'-P-CCNC: 51 U/L (ref 5–45)
BASOPHILS # BLD AUTO: 0.08 THOUSANDS/ΜL (ref 0–0.1)
BASOPHILS NFR BLD AUTO: 1 % (ref 0–1)
BILIRUB SERPL-MCNC: 1.98 MG/DL (ref 0.2–1)
BUN SERPL-MCNC: 4 MG/DL (ref 5–25)
CALCIUM SERPL-MCNC: 9.4 MG/DL (ref 8.3–10.1)
CHLORIDE SERPL-SCNC: 103 MMOL/L (ref 100–108)
CO2 SERPL-SCNC: 26 MMOL/L (ref 21–32)
CREAT SERPL-MCNC: 0.85 MG/DL (ref 0.6–1.3)
EOSINOPHIL # BLD AUTO: 0.16 THOUSAND/ΜL (ref 0–0.61)
EOSINOPHIL NFR BLD AUTO: 2 % (ref 0–6)
ERYTHROCYTE [DISTWIDTH] IN BLOOD BY AUTOMATED COUNT: 12.5 % (ref 11.6–15.1)
GFR SERPL CREATININE-BSD FRML MDRD: 102 ML/MIN/1.73SQ M
GLUCOSE SERPL-MCNC: 108 MG/DL (ref 65–140)
GLUCOSE SERPL-MCNC: 129 MG/DL (ref 65–140)
GLUCOSE SERPL-MCNC: 188 MG/DL (ref 65–140)
HCT VFR BLD AUTO: 41.9 % (ref 36.5–49.3)
HGB BLD-MCNC: 14.2 G/DL (ref 12–17)
IMM GRANULOCYTES # BLD AUTO: 0.09 THOUSAND/UL (ref 0–0.2)
IMM GRANULOCYTES NFR BLD AUTO: 1 % (ref 0–2)
LYMPHOCYTES # BLD AUTO: 3.9 THOUSANDS/ΜL (ref 0.6–4.47)
LYMPHOCYTES NFR BLD AUTO: 55 % (ref 14–44)
MCH RBC QN AUTO: 35.1 PG (ref 26.8–34.3)
MCHC RBC AUTO-ENTMCNC: 33.9 G/DL (ref 31.4–37.4)
MCV RBC AUTO: 104 FL (ref 82–98)
MONOCYTES # BLD AUTO: 0.96 THOUSAND/ΜL (ref 0.17–1.22)
MONOCYTES NFR BLD AUTO: 13 % (ref 4–12)
NEUTROPHILS # BLD AUTO: 1.97 THOUSANDS/ΜL (ref 1.85–7.62)
NEUTS SEG NFR BLD AUTO: 28 % (ref 43–75)
NRBC BLD AUTO-RTO: 1 /100 WBCS
PLATELET # BLD AUTO: 128 THOUSANDS/UL (ref 149–390)
PMV BLD AUTO: 12.4 FL (ref 8.9–12.7)
POTASSIUM SERPL-SCNC: 3.2 MMOL/L (ref 3.5–5.3)
PROT SERPL-MCNC: 6.4 G/DL (ref 6.4–8.2)
RBC # BLD AUTO: 4.05 MILLION/UL (ref 3.88–5.62)
SODIUM SERPL-SCNC: 139 MMOL/L (ref 136–145)
WBC # BLD AUTO: 7.16 THOUSAND/UL (ref 4.31–10.16)

## 2019-03-29 PROCEDURE — 97163 PT EVAL HIGH COMPLEX 45 MIN: CPT

## 2019-03-29 PROCEDURE — G8978 MOBILITY CURRENT STATUS: HCPCS

## 2019-03-29 PROCEDURE — 82948 REAGENT STRIP/BLOOD GLUCOSE: CPT

## 2019-03-29 PROCEDURE — 99239 HOSP IP/OBS DSCHRG MGMT >30: CPT | Performed by: HOSPITALIST

## 2019-03-29 PROCEDURE — 80053 COMPREHEN METABOLIC PANEL: CPT | Performed by: PHYSICIAN ASSISTANT

## 2019-03-29 PROCEDURE — 85025 COMPLETE CBC W/AUTO DIFF WBC: CPT | Performed by: PHYSICIAN ASSISTANT

## 2019-03-29 PROCEDURE — G8979 MOBILITY GOAL STATUS: HCPCS

## 2019-03-29 RX ORDER — PANTOPRAZOLE SODIUM 40 MG/1
40 TABLET, DELAYED RELEASE ORAL
Qty: 30 TABLET | Refills: 0 | Status: SHIPPED | OUTPATIENT
Start: 2019-03-30

## 2019-03-29 RX ORDER — POTASSIUM CHLORIDE 20 MEQ/1
40 TABLET, EXTENDED RELEASE ORAL 2 TIMES DAILY
Status: DISCONTINUED | OUTPATIENT
Start: 2019-03-29 | End: 2019-03-29 | Stop reason: HOSPADM

## 2019-03-29 RX ORDER — INSULIN GLARGINE 100 [IU]/ML
30 INJECTION, SOLUTION SUBCUTANEOUS 2 TIMES DAILY
Qty: 20 ML | Refills: 0 | Status: SHIPPED | OUTPATIENT
Start: 2019-03-29

## 2019-03-29 RX ADMIN — DULOXETINE HYDROCHLORIDE 40 MG: 20 CAPSULE, DELAYED RELEASE ORAL at 08:56

## 2019-03-29 RX ADMIN — HEPARIN SODIUM 5000 UNITS: 5000 INJECTION, SOLUTION INTRAVENOUS; SUBCUTANEOUS at 05:11

## 2019-03-29 RX ADMIN — FOLIC ACID 1 MG: 1 TABLET ORAL at 08:57

## 2019-03-29 RX ADMIN — PANTOPRAZOLE SODIUM 40 MG: 40 TABLET, DELAYED RELEASE ORAL at 05:11

## 2019-03-29 RX ADMIN — TOPIRAMATE 50 MG: 25 TABLET, FILM COATED ORAL at 08:57

## 2019-03-29 RX ADMIN — POTASSIUM CHLORIDE 40 MEQ: 1500 TABLET, EXTENDED RELEASE ORAL at 08:57

## 2019-03-29 RX ADMIN — VITAMIN B12 0.1 MG ORAL TABLET 100 MCG: 0.1 TABLET ORAL at 08:56

## 2019-03-29 RX ADMIN — SERTRALINE HYDROCHLORIDE 100 MG: 100 TABLET ORAL at 08:57

## 2019-03-29 RX ADMIN — FERROUS SULFATE TAB 325 MG (65 MG ELEMENTAL FE) 325 MG: 325 (65 FE) TAB at 08:57

## 2019-03-29 RX ADMIN — LEVETIRACETAM 500 MG: 500 TABLET ORAL at 08:57

## 2019-03-29 RX ADMIN — THIAMINE HYDROCHLORIDE 100 MG: 100 INJECTION, SOLUTION INTRAMUSCULAR; INTRAVENOUS at 08:56

## 2019-03-29 RX ADMIN — MAGNESIUM OXIDE TAB 400 MG (241.3 MG ELEMENTAL MG) 400 MG: 400 (241.3 MG) TAB at 08:57

## 2019-03-29 RX ADMIN — INSULIN LISPRO 1 UNITS: 100 INJECTION, SOLUTION INTRAVENOUS; SUBCUTANEOUS at 12:16

## 2019-03-29 RX ADMIN — CHOLESTYRAMINE 4 G: 4 POWDER, FOR SUSPENSION ORAL at 08:58

## 2019-03-29 RX ADMIN — INSULIN GLARGINE 20 UNITS: 100 INJECTION, SOLUTION SUBCUTANEOUS at 09:09

## 2019-03-29 RX ADMIN — Medication 1 TABLET: at 08:57

## 2020-06-27 ENCOUNTER — APPOINTMENT (EMERGENCY)
Dept: RADIOLOGY | Facility: HOSPITAL | Age: 50
DRG: 637 | End: 2020-06-27
Payer: COMMERCIAL

## 2020-06-27 ENCOUNTER — HOSPITAL ENCOUNTER (INPATIENT)
Facility: HOSPITAL | Age: 50
LOS: 2 days | Discharge: HOME HEALTH | DRG: 637 | End: 2020-06-29
Attending: EMERGENCY MEDICINE | Admitting: FAMILY MEDICINE
Payer: COMMERCIAL

## 2020-06-27 DIAGNOSIS — A41.9 SEPSIS (HCC): ICD-10-CM

## 2020-06-27 DIAGNOSIS — E11.621 DIABETIC ULCER OF TOE OF RIGHT FOOT ASSOCIATED WITH TYPE 2 DIABETES MELLITUS, LIMITED TO BREAKDOWN OF SKIN (HCC): Primary | ICD-10-CM

## 2020-06-27 DIAGNOSIS — L97.511 DIABETIC ULCER OF TOE OF RIGHT FOOT ASSOCIATED WITH TYPE 2 DIABETES MELLITUS, LIMITED TO BREAKDOWN OF SKIN (HCC): Primary | ICD-10-CM

## 2020-06-27 PROBLEM — G40.909 SEIZURE DISORDER (HCC): Chronic | Status: ACTIVE | Noted: 2020-06-27

## 2020-06-27 PROBLEM — E87.2 LACTIC ACIDOSIS: Status: ACTIVE | Noted: 2020-06-27

## 2020-06-27 PROBLEM — L97.512 DIABETIC ULCER OF TOE OF RIGHT FOOT ASSOCIATED WITH TYPE 2 DIABETES MELLITUS, WITH FAT LAYER EXPOSED (HCC): Status: ACTIVE | Noted: 2020-06-27

## 2020-06-27 PROBLEM — E87.20 LACTIC ACIDOSIS: Status: ACTIVE | Noted: 2020-06-27

## 2020-06-27 PROBLEM — L97.519 DIABETIC ULCER OF TOE OF RIGHT FOOT ASSOCIATED WITH TYPE 2 DIABETES MELLITUS (HCC): Status: ACTIVE | Noted: 2020-06-27

## 2020-06-27 PROBLEM — F43.10 PTSD (POST-TRAUMATIC STRESS DISORDER): Chronic | Status: ACTIVE | Noted: 2020-06-27

## 2020-06-27 LAB
ALBUMIN SERPL BCP-MCNC: 2.6 G/DL (ref 3.5–5.7)
ALP SERPL-CCNC: 166 U/L (ref 40–150)
ALT SERPL W P-5'-P-CCNC: 33 U/L (ref 7–52)
ANION GAP SERPL CALCULATED.3IONS-SCNC: 12 MMOL/L (ref 4–13)
APTT PPP: 37 SECONDS (ref 23–37)
AST SERPL W P-5'-P-CCNC: 35 U/L (ref 13–39)
BASOPHILS # BLD AUTO: 0.1 THOUSANDS/ΜL (ref 0–0.1)
BASOPHILS NFR BLD AUTO: 1 % (ref 0–2)
BILIRUB SERPL-MCNC: 0.5 MG/DL (ref 0.2–1)
BUN SERPL-MCNC: 5 MG/DL (ref 7–25)
CALCIUM SERPL-MCNC: 8 MG/DL (ref 8.6–10.5)
CHLORIDE SERPL-SCNC: 101 MMOL/L (ref 98–107)
CO2 SERPL-SCNC: 21 MMOL/L (ref 21–31)
CREAT SERPL-MCNC: 1.17 MG/DL (ref 0.7–1.3)
EOSINOPHIL # BLD AUTO: 0.1 THOUSAND/ΜL (ref 0–0.61)
EOSINOPHIL NFR BLD AUTO: 1 % (ref 0–5)
ERYTHROCYTE [DISTWIDTH] IN BLOOD BY AUTOMATED COUNT: 12.8 % (ref 11.5–14.5)
EST. AVERAGE GLUCOSE BLD GHB EST-MCNC: 200 MG/DL
GFR SERPL CREATININE-BSD FRML MDRD: 72 ML/MIN/1.73SQ M
GLUCOSE SERPL-MCNC: 105 MG/DL (ref 65–140)
GLUCOSE SERPL-MCNC: 261 MG/DL (ref 65–140)
GLUCOSE SERPL-MCNC: 273 MG/DL (ref 65–140)
GLUCOSE SERPL-MCNC: 352 MG/DL (ref 65–99)
GLUCOSE SERPL-MCNC: 80 MG/DL (ref 65–140)
GLUCOSE SERPL-MCNC: 83 MG/DL (ref 65–140)
HBA1C MFR BLD: 8.6 %
HCT VFR BLD AUTO: 36.6 % (ref 42–47)
HGB BLD-MCNC: 12.3 G/DL (ref 14–18)
INR PPP: 2.02 (ref 0.84–1.19)
LACTATE SERPL-SCNC: 1.6 MMOL/L (ref 0.5–2)
LACTATE SERPL-SCNC: 2.8 MMOL/L (ref 0.5–2)
LACTATE SERPL-SCNC: 2.9 MMOL/L (ref 0.5–2)
LYMPHOCYTES # BLD AUTO: 4.7 THOUSANDS/ΜL (ref 0.6–4.47)
LYMPHOCYTES NFR BLD AUTO: 47 % (ref 21–51)
MCH RBC QN AUTO: 32.3 PG (ref 26–34)
MCHC RBC AUTO-ENTMCNC: 33.5 G/DL (ref 31–37)
MCV RBC AUTO: 96 FL (ref 81–99)
MONOCYTES # BLD AUTO: 1.2 THOUSAND/ΜL (ref 0.17–1.22)
MONOCYTES NFR BLD AUTO: 13 % (ref 2–12)
NEUTROPHILS # BLD AUTO: 3.8 THOUSANDS/ΜL (ref 1.4–6.5)
NEUTS SEG NFR BLD AUTO: 38 % (ref 42–75)
PLATELET # BLD AUTO: 395 THOUSANDS/UL (ref 149–390)
PMV BLD AUTO: 9.2 FL (ref 8.6–11.7)
POTASSIUM SERPL-SCNC: 3.8 MMOL/L (ref 3.5–5.5)
PROCALCITONIN SERPL-MCNC: 0.2 NG/ML
PROT SERPL-MCNC: 6.5 G/DL (ref 6.4–8.9)
PROTHROMBIN TIME: 22.2 SECONDS (ref 11.6–14.5)
RBC # BLD AUTO: 3.8 MILLION/UL (ref 4.3–5.9)
SODIUM SERPL-SCNC: 134 MMOL/L (ref 134–143)
WBC # BLD AUTO: 10 THOUSAND/UL (ref 4.8–10.8)

## 2020-06-27 PROCEDURE — 99284 EMERGENCY DEPT VISIT MOD MDM: CPT | Performed by: INTERNAL MEDICINE

## 2020-06-27 PROCEDURE — 85730 THROMBOPLASTIN TIME PARTIAL: CPT | Performed by: EMERGENCY MEDICINE

## 2020-06-27 PROCEDURE — 82948 REAGENT STRIP/BLOOD GLUCOSE: CPT

## 2020-06-27 PROCEDURE — 85610 PROTHROMBIN TIME: CPT | Performed by: EMERGENCY MEDICINE

## 2020-06-27 PROCEDURE — 99285 EMERGENCY DEPT VISIT HI MDM: CPT

## 2020-06-27 PROCEDURE — 87040 BLOOD CULTURE FOR BACTERIA: CPT | Performed by: EMERGENCY MEDICINE

## 2020-06-27 PROCEDURE — 96361 HYDRATE IV INFUSION ADD-ON: CPT

## 2020-06-27 PROCEDURE — 84145 PROCALCITONIN (PCT): CPT | Performed by: EMERGENCY MEDICINE

## 2020-06-27 PROCEDURE — 36415 COLL VENOUS BLD VENIPUNCTURE: CPT | Performed by: EMERGENCY MEDICINE

## 2020-06-27 PROCEDURE — 83605 ASSAY OF LACTIC ACID: CPT | Performed by: EMERGENCY MEDICINE

## 2020-06-27 PROCEDURE — 80053 COMPREHEN METABOLIC PANEL: CPT | Performed by: EMERGENCY MEDICINE

## 2020-06-27 PROCEDURE — 99222 1ST HOSP IP/OBS MODERATE 55: CPT | Performed by: FAMILY MEDICINE

## 2020-06-27 PROCEDURE — 83036 HEMOGLOBIN GLYCOSYLATED A1C: CPT | Performed by: FAMILY MEDICINE

## 2020-06-27 PROCEDURE — 85025 COMPLETE CBC W/AUTO DIFF WBC: CPT | Performed by: EMERGENCY MEDICINE

## 2020-06-27 PROCEDURE — 73630 X-RAY EXAM OF FOOT: CPT

## 2020-06-27 PROCEDURE — 83605 ASSAY OF LACTIC ACID: CPT | Performed by: FAMILY MEDICINE

## 2020-06-27 PROCEDURE — 96374 THER/PROPH/DIAG INJ IV PUSH: CPT

## 2020-06-27 RX ORDER — HYDROCODONE BITARTRATE AND ACETAMINOPHEN 5; 325 MG/1; MG/1
2 TABLET ORAL EVERY 6 HOURS PRN
Status: DISCONTINUED | OUTPATIENT
Start: 2020-06-27 | End: 2020-06-29 | Stop reason: HOSPADM

## 2020-06-27 RX ORDER — VANCOMYCIN HYDROCHLORIDE 1 G/200ML
15 INJECTION, SOLUTION INTRAVENOUS ONCE
Status: DISCONTINUED | OUTPATIENT
Start: 2020-06-27 | End: 2020-06-27

## 2020-06-27 RX ORDER — HYDROCODONE BITARTRATE AND ACETAMINOPHEN 5; 325 MG/1; MG/1
1 TABLET ORAL EVERY 12 HOURS PRN
COMMUNITY

## 2020-06-27 RX ORDER — HYDROCODONE BITARTRATE AND ACETAMINOPHEN 5; 325 MG/1; MG/1
1 TABLET ORAL EVERY 6 HOURS PRN
Status: DISCONTINUED | OUTPATIENT
Start: 2020-06-27 | End: 2020-06-29 | Stop reason: HOSPADM

## 2020-06-27 RX ORDER — ONDANSETRON 2 MG/ML
4 INJECTION INTRAMUSCULAR; INTRAVENOUS EVERY 4 HOURS PRN
Status: DISCONTINUED | OUTPATIENT
Start: 2020-06-27 | End: 2020-06-29 | Stop reason: HOSPADM

## 2020-06-27 RX ORDER — THIAMINE MONONITRATE (VIT B1) 100 MG
100 TABLET ORAL DAILY
Status: DISCONTINUED | OUTPATIENT
Start: 2020-06-27 | End: 2020-06-29 | Stop reason: HOSPADM

## 2020-06-27 RX ORDER — LEVETIRACETAM 500 MG/1
500 TABLET ORAL EVERY 12 HOURS SCHEDULED
Status: DISCONTINUED | OUTPATIENT
Start: 2020-06-27 | End: 2020-06-29 | Stop reason: HOSPADM

## 2020-06-27 RX ORDER — FOLIC ACID 1 MG/1
1 TABLET ORAL DAILY
Status: DISCONTINUED | OUTPATIENT
Start: 2020-06-27 | End: 2020-06-29 | Stop reason: HOSPADM

## 2020-06-27 RX ORDER — CEFEPIME HYDROCHLORIDE 2 G/50ML
2000 INJECTION, SOLUTION INTRAVENOUS EVERY 12 HOURS
Status: DISCONTINUED | OUTPATIENT
Start: 2020-06-27 | End: 2020-06-29 | Stop reason: HOSPADM

## 2020-06-27 RX ORDER — LAMOTRIGINE 25 MG/1
25 TABLET ORAL DAILY
Status: DISCONTINUED | OUTPATIENT
Start: 2020-06-27 | End: 2020-06-29 | Stop reason: HOSPADM

## 2020-06-27 RX ORDER — PANTOPRAZOLE SODIUM 40 MG/1
40 TABLET, DELAYED RELEASE ORAL
Status: DISCONTINUED | OUTPATIENT
Start: 2020-06-27 | End: 2020-06-29 | Stop reason: HOSPADM

## 2020-06-27 RX ORDER — CEFEPIME HYDROCHLORIDE 2 G/50ML
2000 INJECTION, SOLUTION INTRAVENOUS ONCE
Status: COMPLETED | OUTPATIENT
Start: 2020-06-27 | End: 2020-06-27

## 2020-06-27 RX ORDER — LANOLIN ALCOHOL/MO/W.PET/CERES
6 CREAM (GRAM) TOPICAL
Status: DISCONTINUED | OUTPATIENT
Start: 2020-06-27 | End: 2020-06-29 | Stop reason: HOSPADM

## 2020-06-27 RX ORDER — MIRTAZAPINE 15 MG/1
45 TABLET, FILM COATED ORAL
Status: DISCONTINUED | OUTPATIENT
Start: 2020-06-27 | End: 2020-06-29 | Stop reason: HOSPADM

## 2020-06-27 RX ORDER — MAGNESIUM HYDROXIDE/ALUMINUM HYDROXICE/SIMETHICONE 120; 1200; 1200 MG/30ML; MG/30ML; MG/30ML
30 SUSPENSION ORAL EVERY 6 HOURS PRN
Status: DISCONTINUED | OUTPATIENT
Start: 2020-06-27 | End: 2020-06-29 | Stop reason: HOSPADM

## 2020-06-27 RX ORDER — ACETAMINOPHEN 325 MG/1
650 TABLET ORAL EVERY 6 HOURS PRN
Status: DISCONTINUED | OUTPATIENT
Start: 2020-06-27 | End: 2020-06-29 | Stop reason: HOSPADM

## 2020-06-27 RX ORDER — OMEPRAZOLE 20 MG/1
40 CAPSULE, DELAYED RELEASE ORAL DAILY
COMMUNITY

## 2020-06-27 RX ORDER — ATORVASTATIN CALCIUM 40 MG/1
40 TABLET, FILM COATED ORAL
Status: DISCONTINUED | OUTPATIENT
Start: 2020-06-27 | End: 2020-06-29 | Stop reason: HOSPADM

## 2020-06-27 RX ORDER — SERTRALINE HYDROCHLORIDE 100 MG/1
100 TABLET, FILM COATED ORAL 2 TIMES DAILY
Status: DISCONTINUED | OUTPATIENT
Start: 2020-06-27 | End: 2020-06-29 | Stop reason: HOSPADM

## 2020-06-27 RX ORDER — BUSPIRONE HYDROCHLORIDE 15 MG/1
30 TABLET ORAL 2 TIMES DAILY
Status: DISCONTINUED | OUTPATIENT
Start: 2020-06-27 | End: 2020-06-29 | Stop reason: HOSPADM

## 2020-06-27 RX ORDER — LAMOTRIGINE 25 MG/1
25 TABLET ORAL DAILY
COMMUNITY

## 2020-06-27 RX ORDER — INSULIN GLARGINE 100 [IU]/ML
30 INJECTION, SOLUTION SUBCUTANEOUS
Status: DISCONTINUED | OUTPATIENT
Start: 2020-06-27 | End: 2020-06-27

## 2020-06-27 RX ORDER — POTASSIUM CHLORIDE 20 MEQ/1
20 TABLET, EXTENDED RELEASE ORAL 2 TIMES DAILY
COMMUNITY

## 2020-06-27 RX ORDER — BUSPIRONE HYDROCHLORIDE 15 MG/1
30 TABLET ORAL 2 TIMES DAILY
COMMUNITY

## 2020-06-27 RX ORDER — DOCUSATE SODIUM 100 MG/1
100 CAPSULE, LIQUID FILLED ORAL 2 TIMES DAILY PRN
Status: DISCONTINUED | OUTPATIENT
Start: 2020-06-27 | End: 2020-06-29 | Stop reason: HOSPADM

## 2020-06-27 RX ADMIN — SERTRALINE HYDROCHLORIDE 100 MG: 100 TABLET ORAL at 17:49

## 2020-06-27 RX ADMIN — BUSPIRONE HYDROCHLORIDE 30 MG: 15 TABLET ORAL at 17:49

## 2020-06-27 RX ADMIN — INSULIN LISPRO 4 UNITS: 100 INJECTION, SOLUTION INTRAVENOUS; SUBCUTANEOUS at 11:52

## 2020-06-27 RX ADMIN — SODIUM CHLORIDE 1000 ML: 0.9 INJECTION, SOLUTION INTRAVENOUS at 06:45

## 2020-06-27 RX ADMIN — SODIUM CHLORIDE 1000 ML: 0.9 INJECTION, SOLUTION INTRAVENOUS at 07:49

## 2020-06-27 RX ADMIN — CEFEPIME HYDROCHLORIDE 2000 MG: 2 INJECTION, SOLUTION INTRAVENOUS at 07:41

## 2020-06-27 RX ADMIN — SODIUM CHLORIDE 1000 ML: 0.9 INJECTION, SOLUTION INTRAVENOUS at 16:10

## 2020-06-27 RX ADMIN — MELATONIN 6 MG: at 21:31

## 2020-06-27 RX ADMIN — VANCOMYCIN HYDROCHLORIDE 2000 MG: 1 INJECTION, POWDER, LYOPHILIZED, FOR SOLUTION INTRAVENOUS at 11:54

## 2020-06-27 RX ADMIN — PANTOPRAZOLE SODIUM 40 MG: 40 TABLET, DELAYED RELEASE ORAL at 10:21

## 2020-06-27 RX ADMIN — ENOXAPARIN SODIUM 40 MG: 40 INJECTION SUBCUTANEOUS at 10:22

## 2020-06-27 RX ADMIN — CEFEPIME HYDROCHLORIDE 2000 MG: 2 INJECTION, SOLUTION INTRAVENOUS at 19:55

## 2020-06-27 RX ADMIN — VANCOMYCIN HYDROCHLORIDE 1250 MG: 1 INJECTION, POWDER, LYOPHILIZED, FOR SOLUTION INTRAVENOUS at 23:22

## 2020-06-27 RX ADMIN — LEVETIRACETAM 500 MG: 500 TABLET, FILM COATED ORAL at 16:08

## 2020-06-27 RX ADMIN — FOLIC ACID 1 MG: 1 TABLET ORAL at 10:21

## 2020-06-27 RX ADMIN — HYDROCODONE BITARTRATE AND ACETAMINOPHEN 2 TABLET: 5; 325 TABLET ORAL at 10:43

## 2020-06-27 RX ADMIN — THIAMINE HCL TAB 100 MG 100 MG: 100 TAB at 10:21

## 2020-06-27 RX ADMIN — Medication 100 MCG: at 10:21

## 2020-06-27 RX ADMIN — SODIUM CHLORIDE 1000 ML: 0.9 INJECTION, SOLUTION INTRAVENOUS at 10:26

## 2020-06-27 RX ADMIN — LAMOTRIGINE 25 MG: 25 TABLET ORAL at 16:09

## 2020-06-27 RX ADMIN — ATORVASTATIN CALCIUM 40 MG: 40 TABLET, FILM COATED ORAL at 16:09

## 2020-06-27 NOTE — ED PROVIDER NOTES
History  Chief Complaint   Patient presents with    Foot Ulcer     This is a 79-year-old man with chief complaint infection as right foot  Patient is in a splenic diabetic  He reports he cannot feel anything below his knees on either side nor kidney feels hands  Patient reports that recently his sugars have been around 200  He reports he has not been drinking alcohol since February  Does have a significant history of alcoholism in the past   Reports this has been noticed for at least the past week  Feels that is significantly worse last night which is why he came to our emergency department at approximately 6 in the morning  States he has not had an also like this before  He has attempted to use some over-the-counter dressings at home which have not  Cannot display prior to admission medications because the patient has not been admitted in this contact  Past Medical History:   Diagnosis Date    Alcoholic hepatitis     Anxiety     Chronic liver disease     Depression     Diabetes (HCC)     Iron deficiency anemia     Pancreatitis     PTSD (post-traumatic stress disorder)     Seizure ([address] )        Past Surgical History:   Procedure Laterality Date    ABDOMINAL SURGERY      CARDIAC CATHETERIZATION      PANCREATECTOMY      SPLENECTOMY      TONSILLECTOMY         History reviewed  No pertinent family history  I have reviewed and agree with the history as documented  E-Cigarette/Vaping    E-Cigarette Use Never User      E-Cigarette/Vaping Substances     Social History     Tobacco Use    Smoking status: Never Smoker    Smokeless tobacco: Never Used   Substance Use Topics    Alcohol use: Yes     Alcohol/week: 15 0 standard drinks     Types: 15 Cans of beer per week     Comment: 15 cans beer daily    Drug use: Never       Review of Systems   Constitutional: Positive for chills  Negative for fever  Eyes: Negative for visual disturbance     Respiratory: Negative for shortness of breath  Cardiovascular: Negative for chest pain  Gastrointestinal: Negative for abdominal distention and abdominal pain  Endocrine: Negative for polyuria  Genitourinary: Negative for decreased urine volume and dysuria  Skin: Positive for wound  Allergic/Immunologic: Positive for immunocompromised state  Neurological: Negative for dizziness, syncope and weakness  Physical Exam  Physical Exam   Constitutional: He is oriented to person, place, and time  He appears well-developed and well-nourished  No distress  HENT:   Head: Normocephalic and atraumatic  Eyes: Pupils are equal, round, and reactive to light  Conjunctivae and EOM are normal    Neck: Normal range of motion  Neck supple  Cardiovascular: Normal rate, regular rhythm and normal heart sounds  Pulmonary/Chest: Effort normal and breath sounds normal  No respiratory distress  Abdominal: Soft  Bowel sounds are normal    Musculoskeletal: Normal range of motion  Right foot has an infected appearing ulcer from the great toe extending to the midfoot  Minimal drainage  Neurological: He is alert and oriented to person, place, and time  Skin: Skin is warm and dry  Psychiatric: He has a normal mood and affect   His behavior is normal  Judgment and thought content normal        Vital Signs  ED Triage Vitals [06/27/20 0608]   Temperature Pulse Respirations Blood Pressure SpO2   98 9 °F (37 2 °C) (!) 120 20 120/79 99 %      Temp Source Heart Rate Source Patient Position - Orthostatic VS BP Location FiO2 (%)   Temporal Monitor Sitting Left arm --      Pain Score       7           Vitals:    06/27/20 0608   BP: 120/79   Pulse: (!) 120   Patient Position - Orthostatic VS: Sitting         Visual Acuity      ED Medications  Medications   sodium chloride 0 9 % bolus 1,000 mL (has no administration in time range)     Followed by   sodium chloride 0 9 % bolus 1,000 mL (has no administration in time range)     Followed by   sodium chloride 0 9 % bolus 1,000 mL (has no administration in time range)       Diagnostic Studies  Results Reviewed     Procedure Component Value Units Date/Time    CBC and differential [[id number]]     Lab Status:  No result Specimen:  Blood     Comprehensive metabolic panel [[id number]]     Lab Status:  No result Specimen:  Blood     Lactic acid [[id number]]     Lab Status:  No result Specimen:  Blood     Procalcitonin [[id number]]     Lab Status:  No result Specimen:  Blood     Protime-INR [[id number]]     Lab Status:  No result Specimen:  Blood     APTT [[id number]]     Lab Status:  No result Specimen:  Blood     Blood culture #1 [[id number]]     Lab Status:  No result Specimen:  Blood     Blood culture #2 [[id number]]     Lab Status:  No result Specimen:  Blood                  XR foot 3+ views RIGHT    (Results Pending)              Procedures  Procedures         ED Course  ED Course as of Jun 27 0757   Sat Jun 27, 2020   0714 Reviewed case with Dr Jazmine Sanders   Accepted transfer of care  1552 Reviewed lab findings with the patient  Concern about the infection to his right great toe  Recommended admission for IV antibiotic use and further evaluation  Patient in agreement          US AUDIT      Most Recent Value   Initial Alcohol Screen: US AUDIT-C    1  How often do you have a drink containing alcohol? 1 Filed at: 06/27/2020 0611   2  How many drinks containing alcohol do you have on a typical day you are drinking? 0 Filed at: 06/27/2020 0611   3a  Male UNDER 65: How often do you have five or more drinks on one occasion? 0 Filed at: 06/27/2020 0904   Audit-C Score  1 Filed at: 06/27/2020 5212                  HAIR/DAST-10      Most Recent Value   How many times in the past year have you    Used an illegal drug or used a prescription medication for non-medical reasons?   Never Filed at: 06/27/2020 3560                                MDM        Disposition  Final diagnoses:   None     ED Disposition     None      Follow-up Information    None         Patient's Medications   Discharge Prescriptions    No medications on file     No discharge procedures on file      PDMP Review     None          ED Provider  Electronically Signed by           Marisela Yeh MD  06/27/20 2341

## 2020-06-27 NOTE — CONSULTS
Vancomycin Assessment    Xu Redding is a 45 y o  male who is currently receiving vancomycin 2000 mg IV X 1 Dose then 1250 mg IV Q 12 Hrs for skin-soft tissue infection     Relevant clinical data and objective history reviewed:  Creatinine   Date Value Ref Range Status   06/27/2020 1 17 0 70 - 1 30 mg/dL Final     Comment:     Standardized to IDMS reference method   03/29/2019 0 85 0 60 - 1 30 mg/dL Final     Comment:     Standardized to IDMS reference method   03/28/2019 0 84 0 60 - 1 30 mg/dL Final     Comment:     Standardized to IDMS reference method     /74 (BP Location: Left arm)   Pulse 103   Temp 98 3 °F (36 8 °C) (Temporal)   Resp 20   Ht 6' 2" (1 88 m)   Wt 76 1 kg (167 lb 12 3 oz)   SpO2 99%   BMI 21 54 kg/m²   No intake/output data recorded  Lab Results   Component Value Date/Time    BUN 5 (L) 06/27/2020 06:40 AM    WBC 10 00 06/27/2020 06:40 AM    HGB 12 3 (L) 06/27/2020 06:40 AM    HCT 36 6 (L) 06/27/2020 06:40 AM    MCV 96 06/27/2020 06:40 AM     (H) 06/27/2020 06:40 AM     Temp Readings from Last 3 Encounters:   06/27/20 98 3 °F (36 8 °C) (Temporal)   03/29/19 98 2 °F (36 8 °C) (Temporal)   03/25/19 97 9 °F (36 6 °C)     Vancomycin Days of Therapy: 1    Assessment/Plan  The patient is currently on vancomycin utilizing scheduled dosing based on actual body weight  Baseline risks associated with therapy include: concomitant nephrotoxic medications and dehydration  The patient is currently receiving 2000 mg IV X 1 Dose then 1250 mg IV Q 12 Hrs and is clinically appropriate and dose will be continued  Pharmacy will also follow closely for s/sx of nephrotoxicity, infusion reactions and appropriateness of therapy  BMP and CBC will be ordered per protocol  Plan for trough as patient approaches steady state, prior to the 5th  dose at approximately 1100 on 6/29/20  Due to infection severity, will target a trough of 15-20 (appropriate for most indications)     Pharmacy will continue to follow the patients culture results and clinical progress daily      Shira Lugo, Pharmacist

## 2020-06-27 NOTE — H&P
H&P Exam - Shamar Cooley 45 y o  male MRN: [id number]    Unit/Bed#: -02 Encounter: [id number]      Assessment/Plan       * Diabetic ulcer of toe of right foot associated with type 2 diabetes mellitus [facility])  Assessment & Plan  Lab Results   Component Value Date    HGBA1C 8 6 (H) 06/27/2020       Recent Labs     06/27/20  0924 06/27/20  1121 06/27/20  1546 06/27/20 2008   POCGLU 273* 261* 80 105       Blood Sugar Average: Last 72 hrs:  (P) 179 75     Diabetic ulcer on dorsal aspect of right great toe x 1-2 weeks  Unclear depth of wound due to overlying eschar  X-ray concerning for possible osteomyelitis of proximal phalange  Obtain MRI to evaluate for possible osteomyelitis  Treat with vancomycin and cefepime  Consult Podiatry  PTSD (post-traumatic stress disorder)  Assessment & Plan  Chronic stable condition  No evidence of acute exacerbation  Continue outpatient medication regimen  Seizure disorder [facility])  Assessment & Plan  Patient reports remote history of several seizures in 2007  Seizure-free since 2007 on Keppra 500 mg b i d  Continue outpatient medication regimen  Lactic acidosis  Assessment & Plan  Lactic acidosis (2 9) at time of presentation, secondary to sepsis  Will treat infection with IV fluid rehydration and antibiotics  Will monitor serial labs and refine plan of care as necessary  Sepsis, unspecified organism [facility])  Assessment & Plan  Early sepsis as evidenced by soft tissue infection of right great toe with possible osteomyelitis and  2 of 4 SIRS criteria positive (tachycardia and tachypnea)  ER initiated vancomycin and cefepime; will continue same  Patient treated with IV fluid boluses in ER  Procalcitonin and blood cultures pending        Type 2 diabetes mellitus with hyperglycemia, without long-term current use of insulin [facility])  Assessment & Plan  Lab Results   Component Value Date    HGBA1C 8 6 (H) 06/27/2020       Recent Labs     06/27/20  3266 06/27/20  1121 06/27/20  1546 06/27/20 2008   POCGLU 273* 261* 80 105       Blood Sugar Average: Last 72 hrs:  (P) 179 75     Diabetes uncontrolled as evidenced by elevated A1c  Initiate insulin protocol with blood glucose AC and HS  History of Present Illness   HPI:  Xu Redding is a 45 y o  male who presents with ulcer on dorsal aspect of right great toe x 1-2 weeks  He reports history of several prior ulcers on bilateral feet requiring antibiotics and wound care, but no need for surgery in past   He reports no sensation distal to knees bilaterally  PCP: No primary care provider on file  Review of Systems   Constitutional: Negative for chills, diaphoresis and fever  Skin: Positive for wound  All other systems reviewed and are negative  Historical Information   Past Medical History:   Diagnosis Date    Alcoholic hepatitis     Anxiety     Chronic liver disease     Depression     Diabetes ([facility] )     Diabetic ulcer of toe of right foot associated with type 2 diabetes mellitus, with fat layer exposed ([place] [id number] ) 6/27/2020    Iron deficiency anemia     Lactic acidosis 6/27/2020    Pancreatitis     PTSD (post-traumatic stress disorder)     Seizure ([place] [address] )     Sepsis, unspecified organism ([place] [address] ) 6/27/2020     Past Surgical History:   Procedure Laterality Date    ABDOMINAL SURGERY      CARDIAC CATHETERIZATION      PANCREATECTOMY      SPLENECTOMY      TONSILLECTOMY       Social History   Social History     Substance and Sexual Activity   Alcohol Use Yes    Alcohol/week: 15 0 standard drinks    Types: 15 Cans of beer per week    Comment: 15 cans beer daily     Social History     Substance and Sexual Activity   Drug Use Never     Social History     Tobacco Use   Smoking Status Never Smoker   Smokeless Tobacco Never Used     E-Cigarette/Vaping    E-Cigarette Use Never User       E-Cigarette/Vaping Substances     Family History: History reviewed   No pertinent family history  Meds/Allergies   all medications and allergies reviewed  No Known Allergies    Objective   Vitals: Blood pressure 107/74, pulse 103, temperature 98 3 °F (36 8 °C), temperature source Temporal, resp  rate 20, height 6' 2" (1 88 m), weight 76 1 kg (167 lb 12 3 oz), SpO2 99 %  Intake/Output Summary (Last 24 hours) at 6/27/2020 0854  Last data filed at 6/27/2020 0749  Gross per 24 hour   Intake 2000 ml   Output --   Net 2000 ml       Invasive Devices     Peripheral Intravenous Line            Peripheral IV 06/27/20 Right Antecubital less than 1 day                Physical Exam   Constitutional: He is oriented to person, place, and time  He appears well-developed and well-nourished  No distress  Generally healthy-appearing male lying recumbent in hospital bed  No acute distress  Conversing easily  HENT:   Head: Normocephalic and atraumatic  Right Ear: External ear normal    Left Ear: External ear normal    Nose: Nose normal    Eyes: Pupils are equal, round, and reactive to light  Conjunctivae and EOM are normal  Right eye exhibits no discharge  Left eye exhibits no discharge  No scleral icterus  Neck: No JVD present  No tracheal deviation present  No thyromegaly present  Cardiovascular: Normal rate, regular rhythm, normal heart sounds and intact distal pulses  Exam reveals no gallop and no friction rub  No murmur heard  Pulmonary/Chest: Effort normal and breath sounds normal  No stridor  No respiratory distress  He has no wheezes  He has no rales  He exhibits no tenderness  Abdominal: Soft  Bowel sounds are normal  He exhibits no distension and no mass  There is no tenderness  There is no rebound and no guarding  Musculoskeletal: He exhibits deformity  He exhibits no edema or tenderness  Bilateral bunions and hammertoes noted   Lymphadenopathy:     He has no cervical adenopathy  Neurological: He is alert and oriented to person, place, and time  A sensory deficit is present  Patient reports no sensation to light touch below knees bilaterally  Skin: Skin is warm and dry  Capillary refill takes less than 2 seconds  No rash noted  He is not diaphoretic  There is erythema  No pallor  Dorsal aspect of right great toe shows eschar approximately 1cm diameter with mildly erythematous border  No associated tenderness, fluctuance, or drainage  Psychiatric: He has a normal mood and affect  His behavior is normal  Judgment and thought content normal    Nursing note and vitals reviewed  Lab Results: I have personally reviewed pertinent reports  Imaging: I have personally reviewed pertinent reports  and I have personally reviewed pertinent films in PACS  EKG, Pathology, and Other Studies: I have personally reviewed pertinent reports  Code Status: Level 1 - Full Code  Advance Directive and Living Will:      Power of :    POLST:      Counseling / Coordination of Care  Total floor / unit time spent today 50 minutes  Greater than 50% of total time was spent with the patient and / or family counseling and / or coordination of care  A description of the counseling / coordination of care:  Greater than 25 minutes spent with this patient discussing diagnosis, prognosis, and plan of care

## 2020-06-28 ENCOUNTER — APPOINTMENT (INPATIENT)
Dept: MRI IMAGING | Facility: HOSPITAL | Age: 50
DRG: 637 | End: 2020-06-28
Payer: COMMERCIAL

## 2020-06-28 PROBLEM — E87.20 LACTIC ACIDOSIS: Status: RESOLVED | Noted: 2020-06-27 | Resolved: 2020-06-28

## 2020-06-28 PROBLEM — E87.2 LACTIC ACIDOSIS: Status: RESOLVED | Noted: 2020-06-27 | Resolved: 2020-06-28

## 2020-06-28 PROBLEM — E11.42 TYPE 2 DIABETES MELLITUS WITH DIABETIC POLYNEUROPATHY, WITHOUT LONG-TERM CURRENT USE OF INSULIN (HCC): Chronic | Status: ACTIVE | Noted: 2019-03-25

## 2020-06-28 LAB
ALBUMIN SERPL BCP-MCNC: 1.8 G/DL (ref 3.5–5.7)
ALP SERPL-CCNC: 129 U/L (ref 40–150)
ALT SERPL W P-5'-P-CCNC: 24 U/L (ref 7–52)
ANION GAP SERPL CALCULATED.3IONS-SCNC: 11 MMOL/L (ref 4–13)
AST SERPL W P-5'-P-CCNC: 45 U/L (ref 13–39)
BASOPHILS # BLD AUTO: 0.1 THOUSANDS/ΜL (ref 0–0.1)
BASOPHILS NFR BLD AUTO: 2 % (ref 0–2)
BILIRUB SERPL-MCNC: 0.5 MG/DL (ref 0.2–1)
BUN SERPL-MCNC: 4 MG/DL (ref 7–25)
CALCIUM SERPL-MCNC: 7.3 MG/DL (ref 8.6–10.5)
CHLORIDE SERPL-SCNC: 107 MMOL/L (ref 98–107)
CO2 SERPL-SCNC: 22 MMOL/L (ref 21–31)
CREAT SERPL-MCNC: 0.7 MG/DL (ref 0.7–1.3)
EOSINOPHIL # BLD AUTO: 0.2 THOUSAND/ΜL (ref 0–0.61)
EOSINOPHIL NFR BLD AUTO: 2 % (ref 0–5)
ERYTHROCYTE [DISTWIDTH] IN BLOOD BY AUTOMATED COUNT: 12.2 % (ref 11.5–14.5)
GFR SERPL CREATININE-BSD FRML MDRD: 109 ML/MIN/1.73SQ M
GLUCOSE SERPL-MCNC: 112 MG/DL (ref 65–140)
GLUCOSE SERPL-MCNC: 123 MG/DL (ref 65–99)
GLUCOSE SERPL-MCNC: 124 MG/DL (ref 65–140)
GLUCOSE SERPL-MCNC: 227 MG/DL (ref 65–140)
GLUCOSE SERPL-MCNC: 242 MG/DL (ref 65–140)
GLUCOSE SERPL-MCNC: 93 MG/DL (ref 65–140)
HCT VFR BLD AUTO: 28.5 % (ref 42–47)
HGB BLD-MCNC: 9.5 G/DL (ref 14–18)
LYMPHOCYTES # BLD AUTO: 3.1 THOUSANDS/ΜL (ref 0.6–4.47)
LYMPHOCYTES NFR BLD AUTO: 34 % (ref 21–51)
MAGNESIUM SERPL-MCNC: 1.2 MG/DL (ref 1.9–2.7)
MCH RBC QN AUTO: 32.5 PG (ref 26–34)
MCHC RBC AUTO-ENTMCNC: 33.3 G/DL (ref 31–37)
MCV RBC AUTO: 98 FL (ref 81–99)
MONOCYTES # BLD AUTO: 1 THOUSAND/ΜL (ref 0.17–1.22)
MONOCYTES NFR BLD AUTO: 11 % (ref 2–12)
NEUTROPHILS # BLD AUTO: 4.7 THOUSANDS/ΜL (ref 1.4–6.5)
NEUTS SEG NFR BLD AUTO: 51 % (ref 42–75)
PHOSPHATE SERPL-MCNC: 2.3 MG/DL (ref 3–5.5)
PLATELET # BLD AUTO: 337 THOUSANDS/UL (ref 149–390)
PMV BLD AUTO: 9.2 FL (ref 8.6–11.7)
POTASSIUM SERPL-SCNC: 3.4 MMOL/L (ref 3.5–5.5)
PROT SERPL-MCNC: 5 G/DL (ref 6.4–8.9)
RBC # BLD AUTO: 2.92 MILLION/UL (ref 4.3–5.9)
SODIUM SERPL-SCNC: 140 MMOL/L (ref 134–143)
WBC # BLD AUTO: 9.1 THOUSAND/UL (ref 4.8–10.8)

## 2020-06-28 PROCEDURE — 97163 PT EVAL HIGH COMPLEX 45 MIN: CPT

## 2020-06-28 PROCEDURE — 82948 REAGENT STRIP/BLOOD GLUCOSE: CPT

## 2020-06-28 PROCEDURE — 85025 COMPLETE CBC W/AUTO DIFF WBC: CPT | Performed by: FAMILY MEDICINE

## 2020-06-28 PROCEDURE — 80053 COMPREHEN METABOLIC PANEL: CPT | Performed by: FAMILY MEDICINE

## 2020-06-28 PROCEDURE — 73718 MRI LOWER EXTREMITY W/O DYE: CPT

## 2020-06-28 PROCEDURE — 83735 ASSAY OF MAGNESIUM: CPT | Performed by: FAMILY MEDICINE

## 2020-06-28 PROCEDURE — 84100 ASSAY OF PHOSPHORUS: CPT | Performed by: FAMILY MEDICINE

## 2020-06-28 PROCEDURE — 99232 SBSQ HOSP IP/OBS MODERATE 35: CPT | Performed by: FAMILY MEDICINE

## 2020-06-28 PROCEDURE — 97167 OT EVAL HIGH COMPLEX 60 MIN: CPT

## 2020-06-28 RX ORDER — POTASSIUM CHLORIDE 20 MEQ/1
20 TABLET, EXTENDED RELEASE ORAL 2 TIMES DAILY
Status: DISCONTINUED | OUTPATIENT
Start: 2020-06-28 | End: 2020-06-29 | Stop reason: HOSPADM

## 2020-06-28 RX ORDER — MAGNESIUM SULFATE HEPTAHYDRATE 40 MG/ML
2 INJECTION, SOLUTION INTRAVENOUS ONCE
Status: COMPLETED | OUTPATIENT
Start: 2020-06-28 | End: 2020-06-28

## 2020-06-28 RX ADMIN — FOLIC ACID 1 MG: 1 TABLET ORAL at 11:42

## 2020-06-28 RX ADMIN — SERTRALINE HYDROCHLORIDE 100 MG: 100 TABLET ORAL at 17:13

## 2020-06-28 RX ADMIN — HYDROCODONE BITARTRATE AND ACETAMINOPHEN 2 TABLET: 5; 325 TABLET ORAL at 13:40

## 2020-06-28 RX ADMIN — MIRTAZAPINE 45 MG: 15 TABLET, FILM COATED ORAL at 21:40

## 2020-06-28 RX ADMIN — CEFEPIME HYDROCHLORIDE 2000 MG: 2 INJECTION, SOLUTION INTRAVENOUS at 09:24

## 2020-06-28 RX ADMIN — LEVETIRACETAM 500 MG: 500 TABLET, FILM COATED ORAL at 21:40

## 2020-06-28 RX ADMIN — PANTOPRAZOLE SODIUM 40 MG: 40 TABLET, DELAYED RELEASE ORAL at 05:03

## 2020-06-28 RX ADMIN — ENOXAPARIN SODIUM 40 MG: 40 INJECTION SUBCUTANEOUS at 09:23

## 2020-06-28 RX ADMIN — VANCOMYCIN HYDROCHLORIDE 1250 MG: 1 INJECTION, POWDER, LYOPHILIZED, FOR SOLUTION INTRAVENOUS at 23:54

## 2020-06-28 RX ADMIN — VANCOMYCIN HYDROCHLORIDE 1250 MG: 1 INJECTION, POWDER, LYOPHILIZED, FOR SOLUTION INTRAVENOUS at 11:44

## 2020-06-28 RX ADMIN — POTASSIUM CHLORIDE 20 MEQ: 1500 TABLET, EXTENDED RELEASE ORAL at 20:30

## 2020-06-28 RX ADMIN — SERTRALINE HYDROCHLORIDE 100 MG: 100 TABLET ORAL at 09:23

## 2020-06-28 RX ADMIN — LEVETIRACETAM 500 MG: 500 TABLET, FILM COATED ORAL at 09:23

## 2020-06-28 RX ADMIN — LAMOTRIGINE 25 MG: 25 TABLET ORAL at 09:23

## 2020-06-28 RX ADMIN — THIAMINE HCL TAB 100 MG 100 MG: 100 TAB at 09:23

## 2020-06-28 RX ADMIN — ATORVASTATIN CALCIUM 40 MG: 40 TABLET, FILM COATED ORAL at 16:21

## 2020-06-28 RX ADMIN — MELATONIN 6 MG: at 21:40

## 2020-06-28 RX ADMIN — BUSPIRONE HYDROCHLORIDE 30 MG: 15 TABLET ORAL at 09:22

## 2020-06-28 RX ADMIN — Medication 100 MCG: at 09:22

## 2020-06-28 RX ADMIN — MAGNESIUM SULFATE IN WATER 2 G: 40 INJECTION, SOLUTION INTRAVENOUS at 20:31

## 2020-06-28 RX ADMIN — CEFEPIME HYDROCHLORIDE 2000 MG: 2 INJECTION, SOLUTION INTRAVENOUS at 21:42

## 2020-06-28 RX ADMIN — BUSPIRONE HYDROCHLORIDE 30 MG: 15 TABLET ORAL at 17:14

## 2020-06-28 RX ADMIN — INSULIN LISPRO 2 UNITS: 100 INJECTION, SOLUTION INTRAVENOUS; SUBCUTANEOUS at 16:22

## 2020-06-28 RX ADMIN — INSULIN LISPRO 3 UNITS: 100 INJECTION, SOLUTION INTRAVENOUS; SUBCUTANEOUS at 21:41

## 2020-06-28 NOTE — PROGRESS NOTES
Vancomycin IV Pharmacy-to-Dose Consultation    Symone Garcia is a 45 y o  male who is currently receiving Vancomycin IV with management by the Pharmacy Consult service  Assessment/Plan:  The patient was reviewed  Renal function is stable and no signs or symptoms of nephrotoxicity and/or infusion reactions were documented in the chart  Based on todays assessment, continue current vancomycin (day # 2) dosing of 1250mg iv q 12 hrs , with a plan for trough to be drawn at 11:00 on 06/29/20  We will continue to follow the patients culture results and clinical progress daily      Damian Olszewski, Pharmacist

## 2020-06-28 NOTE — ASSESSMENT & PLAN NOTE
Lab Results   Component Value Date    HGBA1C 8 6 (H) 06/27/2020       Recent Labs     06/27/20  0924 06/27/20  1121 06/27/20  1546 06/27/20 2008   POCGLU 273* 261* 80 105       Blood Sugar Average: Last 72 hrs:  (P) 179 75     Diabetes uncontrolled as evidenced by elevated A1c  Initiate insulin protocol with blood glucose AC and HS

## 2020-06-28 NOTE — PHYSICAL THERAPY NOTE
Physical Therapy Evaluation     Patient's Name: Watson Mayorga    Admitting Diagnosis  Foot ulcer ([place] [address] ) [E92 149]  Sepsis ([place] [address] ) [A41 9]  Diabetic ulcer of toe of right foot associated with type 2 diabetes mellitus, limited to breakdown of skin ([facility] ) [Q17 656, L97 511]    Problem List  Patient Active Problem List   Diagnosis    Alcoholic ketoacidosis    Type 2 diabetes mellitus with diabetic polyneuropathy, without long-term current use of insulin ([facility] )    Chronic alcohol abuse    SUSIE (acute kidney injury) ([facility] )    Gastrointestinal hemorrhage with hematemesis    Diabetic ulcer of toe of right foot associated with type 2 diabetes mellitus ([facility] )    Sepsis, unspecified organism ([facility] )    Seizure disorder ([place] [address] )    PTSD (post-traumatic stress disorder)       Past Medical History  Past Medical History:   Diagnosis Date    Alcoholic hepatitis     Anxiety     Chronic liver disease     Depression     Diabetes ([facility] )     Diabetic ulcer of toe of right foot associated with type 2 diabetes mellitus ([facility] ) 6/27/2020    Diabetic ulcer of toe of right foot associated with type 2 diabetes mellitus, with fat layer exposed ([facility] ) 6/27/2020    Iron deficiency anemia     Lactic acidosis 6/27/2020    Pancreatitis     PTSD (post-traumatic stress disorder)     Seizure ([place] [address] )     Seizure disorder ([facility] ) 6/27/2020    Sepsis, unspecified organism ([facility] ) 6/27/2020    Type 2 diabetes mellitus with diabetic polyneuropathy, without long-term current use of insulin ([facility] ) 3/25/2019       Past Surgical History  Past Surgical History:   Procedure Laterality Date    ABDOMINAL SURGERY      CARDIAC CATHETERIZATION      PANCREATECTOMY      SPLENECTOMY      TONSILLECTOMY          06/28/20 6228   Note Type   Note type Eval only   Pain Assessment   Pain Assessment Tool 0-10   Pain Score 8   Pain Location/Orientation Location: Foot; Location: Leg;Location: Back; Location: Generalized   Pain Onset/Description Onset: Ongoing;Frequency: Constant/Continuous; Descriptor: Aching;Descriptor: Burning; Descriptor: Discomfort; Descriptor: [facility] Pain Intervention(s) Repositioned; Ambulation/increased activity; Medication (See MAR)   Home Living   Type of [address] Multi-level;Bed/bath upstairs; Performs ADLs on one level; Able to live on main level with bedroom/bathroom; Access  (stair glide basement>1st floor and 1st>2nd floor )   Bathroom Shower/Tub Walk-in shower   Bathroom Toilet Raised   Bathroom Equipment Grab bars in shower;Built-in shower seat;Grab bars around toilet   [address] Walker;Cane;Wheelchair-manual   Prior Function   Level of [place] Independent with ADLs and functional mobility   Lives With Alone   ADL Assistance Independent   IADLs Independent   Falls in the last 6 months >10  ("1-2 times a day")   Comments Pt reports that he has been having increased difficulty with ADLs/IADLs and may warrant long-term SNF   Restrictions/Precautions   Weight Bearing Precautions Per Order No   Braces or Orthoses   (Surgical shoe)   Other Precautions Fall Risk;Pain   General   Family/Caregiver Present No   Cognition   Overall Cognitive Status WFL   Arousal/Participation Alert   Attention Within functional limits   Orientation Level Oriented X4   Memory Within functional limits   Following Commands Follows one step commands without difficulty   Comments pt agreeable to PT session   RLE Assessment   RLE Assessment X   Strength RLE   RLE Overall Strength 4-/5   LLE Assessment   LLE Assessment X   Strength LLE   LLE Overall Strength 4-/5   Coordination   Movements are Fluid and Coordinated 0   Sensation   (decreased proprioception and kinthesia)   Proprioception   RLE Proprioception Impaired   LLE Proprioception Impaired   Bed Mobility   Supine to Sit 5  Supervision   Additional items Assist x 1;HOB elevated; Increased time required;Verbal cues   Sit to Supine 5 Supervision   Additional items Assist x 1;HOB elevated; Increased time required;Verbal cues   Transfers   Sit to Stand 4  Minimal assistance   Additional items Assist x 1; Increased time required;Verbal cues   Stand to Sit 4  Minimal assistance   Additional items Assist x 1; Increased time required;Verbal cues   Ambulation/Elevation   Gait pattern   (increased hip and knee flexion)   Gait Assistance 4  Minimal assist   Additional items Assist x 1;Verbal cues   Assistive Device Rolling walker   Distance 30 ft   Balance   Static Sitting Good   Dynamic Sitting Fair +   Static Standing Fair -   Dynamic Standing Poor +   Ambulatory Poor +   Endurance Deficit   Endurance Deficit Yes   Endurance Deficit Description reported fatigue with exertion   Activity Tolerance   Activity Tolerance Patient limited by fatigue;Patient limited by pain   Assessment   Prognosis Fair   Problem List Decreased strength;Decreased endurance; Impaired balance;Decreased mobility; Decreased coordination;Pain   Assessment Pt is 45 y o  male seen for PT evaluation s/p admit to [address] [place] [address] on 6/27/2020 w/ Diabetic ulcer of toe of right foot associated with type 2 diabetes mellitus ([facility] )  PT consulted to assess pt's functional mobility and d/c needs  Order placed for PT eval and tx, w/ up as tolerated order  Comorbidities affecting pt's physical performance at time of assessment include: peripheral neuropathy, anxiety and depression  PTA, pt was independent w/ all functional mobility w/ RW/cane, ambulates household distances, lives alone in multi level house and on disability  Personal factors affecting pt at time of IE include: lives in multi story house, ambulating w/ assistive device, positive fall history, anxiety and depression   Please find objective findings from PT assessment regarding body systems outlined above with impairments and limitations including weakness, impaired balance, decreased endurance, impaired coordination, gait deviations, pain, decreased activity tolerance, decreased functional mobility tolerance, decreased safety awareness and fall risk  Pt's clinical presentation is currently unstable/unpredictable seen in pt's presentation of frequent falls and wound  Pt to benefit from continued PT tx to address deficits as defined above and maximize level of functional independent mobility and consistency  From PT/mobility standpoint, recommendation at time of d/c would be STR pending progress in order to facilitate return to PLOF  Barriers to Discharge Decreased caregiver support   Goals   Patient Goals to fell better and go home   LTG Expiration Date 07/08/20   Long Term Goal #1 Pt will: Perform bed mobility tasks to modified I to improve ease of bed mobility  Perform transfers to modified I to improve ease of transfers  Perform ambulation with MI and RW for 150 ft to     increase Indep in home environment  Increase dynamic standing balance to F+ to decrease fall risk  Increase BLE strength to 4+/5 to improve functional mobility  Increase OOB activity tolerance to 10 minutes without s/s of exertion to decrease fall risk  Navigate up and down 12 steps with MI so patient can navigate in home  PT Treatment Day 0   Plan   Treatment/Interventions Functional transfer training;LE strengthening/ROM; Elevations; Therapeutic exercise; Endurance training;Bed mobility;Gait training   PT Frequency   (3-5x/wk)   Recommendation   PT Discharge Recommendation Post-Acute Rehabilitation Services   Equipment Recommended Walker; Wheelchair   PT - OK to Discharge No   Additional Comments upon conclusion pt supine in bed with all needs in reach and SCDs in place           [address] [facility],

## 2020-06-28 NOTE — PLAN OF CARE
Problem: OCCUPATIONAL THERAPY ADULT  Goal: Performs self-care activities at highest level of function for planned discharge setting  See evaluation for individualized goals  Description  Treatment Interventions: ADL retraining, Functional transfer training, UE strengthening/ROM, Endurance training, Equipment evaluation/education, Compensatory technique education, Activityengagement          See flowsheet documentation for full assessment, interventions and recommendations  Note:   Limitation: Decreased ADL status, Decreased UE strength, Decreased Safe judgement during ADL, Decreased endurance, Decreased self-care trans, Decreased high-level ADLs  Prognosis: Fair  Assessment: Pt is a 45 y o  male who was admitted to [address] [place] [address] on 6/27/2020 with Diabetic ulcer of toe of right foot associated with type 2 diabetes mellitus ([facility] )  At this time, patient is also affected by the comorbidities of: sepsis, seizure disorder, PTSD, and DM2  Additionally, patient is affected by the following personal factors:limited home support, difficulty performing ADLS and difficulty performing IADLS   Orders placed for OT evaluation/treatment with up with assistance orders  Prior to admission, Patient reporting being independent with ADLs/IADLs, ambulatory with RW or SPC and lives alone in a multi-level house  Upon OT evaluation, patient requires supervision/set-up for UB ADLs and minimum assist and moderate assist for LB ADLs  Occupational performance is affected by the following deficits: decreased strength, decreased balance, decreased tolerance, decreased safety awareness, increased pain and decreased coping skills  Based on the following information, patient would benefit from continued skilled OT treatment while in the hospital to address deficits and maximize level of functional independence with ADL's and functional mobility   Occupational Performance areas to address include: bathing/shower, toilet hygiene, dressing, medication management, functional mobility, clothing management, meal prep and household maintenance  From OT standpoint, recommendation at time of d/c would be Post-Acute Rehabilitation Services       OT Discharge Recommendation: Post-Acute Rehabilitation Services  OT - OK to Discharge: Yes(Once medically cleared)  Lion Grady, OT

## 2020-06-28 NOTE — CONSULTS
Consultation - [address] [facility] [employer] [facility] Javad 45 y o  male MRN: [id number]  Unit/Bed#: -02 Encounter: [id number]    Assessment/Plan     Assessment:  Grade 4 ulceration right hallux interphalangeal joint  Osteomyelitis right hallux  Plan:  MRI is on order  We cleaned the wound site and applied Xeroform and a dry, sterile dressing  I discussed different treatment options with the patient:  1  Long-term antibiotic therapy, 2  debridement of infected bone at the proximal phalanx head and 3  hallux amputation  We will await results of the MRI to determine how much of the proximal phalanx is involved  Either myself or Dr Jorge Zavaleta monitor him and, with a definitive game plan  History of Present Illness   HPI:  Babatuned Lynn is a 45 y o  male who was admitted to [address] [place] [address] on 06/27/2020 with right great toe diabetic infection  Early sepsis  Patient admitted for IV antibiotics and local care  The wound has been present for approximately 2week(s)  The wound is nontender  Currently he denies any fevers, chills or night sweats  He relates he has severe neuropathy with no sensation below the mid legs  He has had previous ulcerations  He did bring a surgical shoe with him to the hospital   Currently on IV vancomycin and cefepime broad coverage  Consults    Review of Systems   Constitutional: Negative for chills and fever  Skin: Positive for wound ( right great toe)  Neurological: Positive for numbness (Lower extremity secondary to peripheral neuropathy)         Historical Information   Past Medical History:   Diagnosis Date    Alcoholic hepatitis     Anxiety     Chronic liver disease     Depression     Diabetes ([facility] )     Diabetic ulcer of toe of right foot associated with type 2 diabetes mellitus ([facility] ) 6/27/2020    Diabetic ulcer of toe of right foot associated with type 2 diabetes mellitus, with fat layer exposed ([facility] ) 6/27/2020    Iron deficiency anemia     Lactic acidosis 6/27/2020    Pancreatitis     PTSD (post-traumatic stress disorder)     Seizure ([facility] )     Seizure disorder ([facility] ) 6/27/2020    Sepsis, unspecified organism ([facility] ) 6/27/2020     Past Surgical History:   Procedure Laterality Date    ABDOMINAL SURGERY      CARDIAC CATHETERIZATION      PANCREATECTOMY      SPLENECTOMY      TONSILLECTOMY       Social History   Social History     Substance and Sexual Activity   Alcohol Use Yes    Alcohol/week: 15 0 standard drinks    Types: 15 Cans of beer per week    Comment: 15 cans beer daily     Social History     Substance and Sexual Activity   Drug Use Never     E-Cigarette/Vaping    E-Cigarette Use Never User      E-Cigarette/Vaping Substances     Social History     Tobacco Use   Smoking Status Never Smoker   Smokeless Tobacco Never Used     Family History: non-contributory    Meds/Allergies   all current active meds have been reviewed  No Known Allergies    Objective   Vitals: Blood pressure 99/65, pulse 97, temperature 99 3 °F (37 4 °C), temperature source Temporal, resp  rate 18, height 6' 2" (1 88 m), weight 76 1 kg (167 lb 12 3 oz), SpO2 97 %  Wounds:     Wound 06/27/20 Pressure Injury Foot Right (Active)   Wound Description Black; Other (Comment); Nicolás Shanell 6/27/2020  8:06 PM   Harper-wound Assessment Pink 6/27/2020  8:06 PM   Drainage Amount None 6/27/2020 10:14 AM   Dressing Open to air 6/27/2020 10:14 AM   Dressing Status Clean;Dry; Intact 6/27/2020  8:06 PM         Physical Exam   Constitutional: He is oriented to person, place, and time  He appears well-developed  Musculoskeletal: He exhibits edema (Mild to moderate nonpitting edema lower extremities  )  Neurological: He is alert and oriented to person, place, and time  A sensory deficit ( severe diabetic peripheral neuropathy with loss of protective sensation to light touch) is present  Skin: Skin is warm  Capillary refill takes less than 2 seconds  No erythema     There is a full-thickness wound down to the capsular layer of the interphalangeal joint of the right great toe  There is no purulence expressible  There is no periwound erythema or malodor present  Heels are intact bilaterally  There is no lesions/wounds on his left lower extremity  Lab Results: I have personally reviewed pertinent reports  Imaging: I have personally reviewed pertinent reports  EKG, Pathology, and Other Studies: I have personally reviewed pertinent reports     and I have personally reviewed pertinent films in PACS    Code Status: Level 1 - Full Code  Advance Directive and Living Will:      Power of :    POLST:

## 2020-06-28 NOTE — NURSING NOTE
Patient refused morning blood work to be drawn, asked for bloodwork to be drawn at a later time, will pass on to oncoming shift  Patient is not in any distress at this time, denies pain/discomfort

## 2020-06-28 NOTE — ASSESSMENT & PLAN NOTE
Patient reports remote history of several seizures in 2007  Seizure-free since 2007 on Keppra 500 mg b i d  Continue outpatient medication regimen

## 2020-06-28 NOTE — UTILIZATION REVIEW
Initial Clinical Review    Admission: Date/Time/Statement: Admission Orders (From admission, onward)     Ordered        06/27/20 0745  Inpatient Admission (expected length of stay for this patient Order details is greater than two midnights)  Once                   Orders Placed This Encounter   Procedures    Inpatient Admission (expected length of stay for this patient Order details is greater than two midnights)     Standing Status:   Standing     Number of Occurrences:   1     Order Specific Question:   Admitting Physician     Answer:   Marifer Vasquez [[id number]]     Order Specific Question:   Level of Care     Answer:   Med Surg [16]     Order Specific Question:   Estimated length of stay     Answer:   More than 2 Midnights     Order Specific Question:   Certification     Answer:   I certify that inpatient services are medically necessary for this patient for a duration of greater than two midnights  See H&P and MD Progress Notes for additional information about the patient's course of treatment  ED Arrival Information     Expected Arrival Acuity Means of Arrival Escorted By Service Admission Type    - 6/27/2020 05:56 Urgent Walk-In Self General Medicine Urgent    Arrival Complaint    foot swelling        Chief Complaint   Patient presents with    Foot Ulcer     Assessment/Plan: this is a 45year old male from home to ED admitted to inpatient due to diabetic ulcer of toe right foot/sepsis  [place] History of DM, PTSD, seizure disorder  Presented due to infection of right foot for last week, worsening last night  On exam right foot infected appearing ulcer from great toe extending to mid foot  Drainage  Lactic acid 2 9  Glucose 352  Wbc 10  00  Xray right foot non specific  In the ED given 2 liters of IVF and IV Cefepime  Plan is continued IV antibiotics, blood sugar control  Consult Podiatry       On 6/28/2020 per Podiatry - has grade 4 ulceration right hallux interphalangeal joint with osteomyelitis right hallux  Plan is MRI, long term IV antibiotics vs debridement of infected bone at proximal phalanx head vs hallux amputation  Will decide after MRI review  6/28/2020 PT recommending post acute rehab service  ED Triage Vitals [06/27/20 0608]   Temperature Pulse Respirations Blood Pressure SpO2   98 9 °F (37 2 °C) (!) 120 20 120/79 99 %      Temp Source Heart Rate Source Patient Position - Orthostatic VS BP Location FiO2 (%)   Temporal Monitor Sitting Left arm --      Pain Score       7        Wt Readings from Last 1 Encounters:   06/27/20 76 1 kg (167 lb 12 3 oz)     Additional Vital Signs:   06/28/20 0729  99 3 °F (37 4 °C)  97  18  99/65  --  97 %  None (Room air)  Lying   06/27/20 2335  99 5 °F (37 5 °C)  96  18  100/65  --  97 %  None (Room air)  Lying   06/27/20 2006  --  --  --  97/66  --  --  --  Lying   06/27/20 1515  98 6 °F (37 °C)  71  20  90/57  --  97 %  None (Room air)  Lying   06/27/20 0846  98 3 °F (36 8 °C)  103  20  107/74  --  99 %  None (Room air)  Lying   06/27/20 0800  --  104  24Abnormal   102/69  --  98 %  --         Pertinent Labs/Diagnostic Test Results:   6/27/202 Xray right foot No acute osseous abnormality   Some focal demineralization is seen in the proximal phalanx of the great toe   This is nonspecific in appearance    6/28/2020 MRI right foot - Plantar and medial skin ulceration involving the first toe at the interphalangeal joint level with associated subcutaneous edema indicating cellulitis but without evidence of abscess collection in this unenhanced study  Although increased T2   marrow changes are noted in this region there is no evidence of T1 replacement signal to indicate osteomyelitis at this time        Results from last 7 days   Lab Units 06/27/20  0640   WBC Thousand/uL 10 00   HEMOGLOBIN g/dL 12 3*   HEMATOCRIT % 36 6*   PLATELETS Thousands/uL 395*   NEUTROS ABS Thousands/µL 3 80     Results from last 7 days   Lab Units 06/27/20  0640   SODIUM mmol/L 134 POTASSIUM mmol/L 3 8   CHLORIDE mmol/L 101   CO2 mmol/L 21   ANION GAP mmol/L 12   BUN mg/dL 5*   CREATININE mg/dL 1 17   EGFR ml/min/1 73sq m 72   CALCIUM mg/dL 8 0*     Results from last 7 days   Lab Units 06/27/20  0640   AST U/L 35   ALT U/L 33   ALK PHOS U/L 166*   TOTAL PROTEIN g/dL 6 5   ALBUMIN g/dL 2 6*   TOTAL BILIRUBIN mg/dL 0 50     Results from last 7 days   Lab Units 06/28/20  0607 06/28/20  0316 06/27/20  2309 06/27/20 2008 06/27/20  1546 06/27/20  1121 06/27/20  0924   POC GLUCOSE mg/dl 93 112 83 105 80 261* 273*     Results from last 7 days   Lab Units 06/27/20  0640   GLUCOSE RANDOM mg/dL 352*         Results from last 7 days   Lab Units 06/27/20  0640   HEMOGLOBIN A1C % 8 6*   EAG mg/dl 200     BETA-HYDROXYBUTYRATE   Date Value Ref Range Status   03/25/2019 2 53 (H) 0 02 - 0 27 mmol/L Final      Results from last 7 days   Lab Units 06/27/20  0641   PROTIME seconds 22 2*   INR  2 02*   PTT seconds 37         Results from last 7 days   Lab Units 06/27/20  0640   PROCALCITONIN ng/ml 0 20     Results from last 7 days   Lab Units 06/27/20 2015 06/27/20  1432 06/27/20  0641   LACTIC ACID mmol/L 1 6 2 8* 2 9*     Results from last 7 days   Lab Units 06/27/20  0640   BLOOD CULTURE  Received in Microbiology Lab  Culture in Progress  Received in Microbiology Lab  Culture in Progress       ED Treatment:   Medication Administration from 06/27/2020 0556 to 06/27/2020 1295       Date/Time Order Dose Route Action Comments     06/27/2020 0645 sodium chloride 0 9 % bolus 1,000 mL 1,000 mL Intravenous New Bag      06/27/2020 0749 sodium chloride 0 9 % bolus 1,000 mL 1,000 mL Intravenous New Bag      06/27/2020 0741 cefepime (MAXIPIME) IVPB (premix) 2,000 mg 50 mL 2,000 mg Intravenous New Bag         Past Medical History:   Diagnosis Date    Alcoholic hepatitis     Anxiety     Chronic liver disease     Depression     Diabetes ([facility] [address] )     Diabetic ulcer of toe of right foot associated with type 2 diabetes mellitus ([place] [address] ) 6/27/2020    Diabetic ulcer of toe of right foot associated with type 2 diabetes mellitus, with fat layer exposed ([place] [address] ) 6/27/2020    Iron deficiency anemia     Lactic acidosis 6/27/2020    Pancreatitis     PTSD (post-traumatic stress disorder)     Seizure ([place] [address] )     Seizure disorder ([place] [address] ) 6/27/2020    Sepsis, unspecified organism ([place] [address] ) 6/27/2020    Type 2 diabetes mellitus with diabetic polyneuropathy, without long-term current use of insulin ([place] [address] ) 3/25/2019     Present on Admission:   Type 2 diabetes mellitus with diabetic polyneuropathy, without long-term current use of insulin (HCC)   Diabetic ulcer of toe of right foot associated with type 2 diabetes mellitus (HCC)   Sepsis, unspecified organism ([place] [address] )   (Resolved) Lactic acidosis   Seizure disorder (HCC)   PTSD (post-traumatic stress disorder)      Admitting Diagnosis: Foot ulcer ([place] [address] ) [L97 509]  Sepsis ([place] [address] ) [A41 9]  Diabetic ulcer of toe of right foot associated with type 2 diabetes mellitus, limited to breakdown of skin ([place] [address] ) [Y09 501, L97 511]  Age/Sex: 45 y o  male  Admission Orders: 6/27/2020 0745 inpatient   Scheduled Medications:  Medications:  atorvastatin 40 mg Oral Daily With Dinner   busPIRone 30 mg Oral BID   cefepime 2,000 mg Intravenous Q12H   cyanocobalamin 100 mcg Oral Daily   enoxaparin 40 mg Subcutaneous Daily   folic acid 1 mg Oral Daily   insulin lispro 1-6 Units Subcutaneous TID AC   insulin lispro 1-6 Units Subcutaneous HS   lamoTRIgine 25 mg Oral Daily   levETIRAcetam 500 mg Oral Q12H [address]   melatonin 6 mg Oral HS   metoprolol tartrate 25 mg Oral Q12H [place]   mirtazapine 45 mg Oral HS   pantoprazole 40 mg Oral Early Morning   sertraline 100 mg Oral BID   thiamine 100 mg Oral Daily   vancomycin 17 5 mg/kg Intravenous Q12H     Continuous IV Infusions:     PRN Meds:  acetaminophen 650 mg Oral Q6H PRN   Or      HYDROcodone-acetaminophen 1 tablet Oral Q6H PRN   Or      HYDROcodone-acetaminophen 2 tablet Oral Q6H PRN aluminum-magnesium hydroxide-simethicone 30 mL Oral Q6H PRN   docusate sodium 100 mg Oral BID PRN   ondansetron 4 mg Intravenous Q4H PRN         IP CONSULT TO PODIATRY    Network Utilization Review Department  [email or web address]  ATTENTION: Please call with any questions or concerns to [phone] and carefully listen to the prompts so that you are directed to the right person  All voicemails are confidential   [place] all requests for admission clinical reviews, approved or denied determinations and any other requests to dedicated fax number below belonging to the campus where the patient is receiving treatment   List of dedicated fax numbers for the Facilities:  [address] DENIALS (Administrative/Medical Necessity) [phone]   [address] (Maternity/NICU/Pediatrics) [phone]   Stephanie Madden [phone]   Janey [place] [phone]   Car Form [phone]   145 [facility]  [phone]   [address] [place] [address] [phone]   [facility]  [phone]   [address] [facility] [address] [facility]  [address] [facility] [address] [facility] [phone]

## 2020-06-28 NOTE — ASSESSMENT & PLAN NOTE
Early sepsis as evidenced by soft tissue infection of right great toe with possible osteomyelitis and  2 of 4 SIRS criteria positive (tachycardia and tachypnea)  No systemic symptoms  ER initiated vancomycin and cefepime; will continue same  Patient treated with IV fluid boluses in ER  Procalcitonin negative and blood cultures pending

## 2020-06-28 NOTE — ASSESSMENT & PLAN NOTE
Lab Results   Component Value Date    HGBA1C 8 6 (H) 06/27/2020       Recent Labs     06/27/20  0924 06/27/20  1121 06/27/20  1546 06/27/20 2008   POCGLU 273* 261* 80 105       Blood Sugar Average: Last 72 hrs:  (P) 179 75     Diabetic ulcer on dorsal aspect of right great toe x 1-2 weeks  Unclear depth of wound due to overlying eschar  X-ray concerning for possible osteomyelitis of proximal phalange  Obtain MRI to evaluate for possible osteomyelitis  Treat with vancomycin and cefepime  Consult Podiatry

## 2020-06-28 NOTE — PROGRESS NOTES
Progress Note - Clerance Tyler 1970, 45 y o  male MRN: [id number]    Unit/Bed#: -02 Encounter: [id number]    Primary Care Provider: No primary care provider on file  Date and time admitted to hospital: 6/27/2020  6:41 AM        * Diabetic ulcer of toe of right foot associated with type 2 diabetes mellitus [facility])  Assessment & Plan  Lab Results   Component Value Date    HGBA1C 8 6 (H) 06/27/2020       Recent Labs     06/27/20  0924 06/27/20  1121 06/27/20  1546 06/27/20 2008   POCGLU 273* 261* 80 105       Blood Sugar Average: Last 72 hrs:  (P) 179 75     Diabetic ulcer on dorsal aspect of right great toe x 1-2 weeks  Unclear depth of wound due to overlying eschar  X-ray concerning for possible osteomyelitis of proximal phalange  Obtain MRI to evaluate for possible osteomyelitis  Treat with vancomycin and cefepime  Consult Podiatry  PTSD (post-traumatic stress disorder)  Assessment & Plan  Chronic stable condition  No evidence of acute exacerbation  Continue outpatient medication regimen  Seizure disorder [facility])  Assessment & Plan  Patient reports remote history of several seizures in 2007  Seizure-free since 2007 on Keppra 500 mg b i d  Continue outpatient medication regimen  Sepsis, unspecified organism [facility])  Assessment & Plan  Early sepsis as evidenced by soft tissue infection of right great toe with possible osteomyelitis and  2 of 4 SIRS criteria positive (tachycardia and tachypnea)  No systemic symptoms  ER initiated vancomycin and cefepime; will continue same  Patient treated with IV fluid boluses in ER  Procalcitonin negative and blood cultures pending        Type 2 diabetes mellitus with diabetic polyneuropathy, without long-term current use of insulin [facility])  Assessment & Plan  Lab Results   Component Value Date    HGBA1C 8 6 (H) 06/27/2020       Recent Labs     06/27/20  0924 06/27/20  1121 06/27/20  1546 06/27/20 2008   POCGLU 273* 261* 80 105       Blood Sugar Average: Last 72 hrs:  (P) 179 75     Diabetes uncontrolled as evidenced by elevated A1c  Initiate insulin protocol with blood glucose AC and HS  Lactic acidosis-resolved as of 2020  Assessment & Plan  Lactic acidosis (2 9) at time of presentation, secondary to sepsis  Will treat infection with IV fluid rehydration and antibiotics  Lactic acidosis resolved with IV fluid rehydration  Will monitor serial labs and refine plan of care as necessary  VTE Pharmacologic Prophylaxis:   Pharmacologic: Enoxaparin (Lovenox)  Mechanical VTE Prophylaxis in Place: Yes    Patient Centered Rounds: I have performed bedside rounds with nursing staff today  Discussions with Specialists or Other Care Team Provider:  Greater than 5 minutes spent with this patient discussing diagnosis, prognosis, and plan of care  Education and Discussions with Family / Patient:  Greater than 10 minutes spent with this patient discussing diagnosis, prognosis, and plan of care  Time Spent for Care: 30 minutes  More than 50% of total time spent on counseling and coordination of care as described above  Current Length of Stay: 1 day(s)    Current Patient Status: Inpatient   Certification Statement: The patient will continue to require additional inpatient hospital stay due to diabetic ulcer requiring surgical care    Discharge Plan: Pending clinical course    Code Status: Level 1 - Full Code      Subjective:   Ulcer on toe cleaned and dressed by podiatry today  Patient reports no new concerns  Objective:     Vitals:   Temp (24hrs), Av 9 °F (37 2 °C), Min:98 3 °F (36 8 °C), Max:99 5 °F (37 5 °C)    Temp:  [98 3 °F (36 8 °C)-99 5 °F (37 5 °C)] 99 3 °F (37 4 °C)  HR:  [] 97  Resp:  [18-24] 18  BP: ()/(57-74) 99/65  SpO2:  [97 %-99 %] 97 %  Body mass index is 21 54 kg/m²  Input and Output Summary (last 24 hours):        Intake/Output Summary (Last 24 hours) at 2020 5769  Last data filed at 6/27/2020 2335  Gross per 24 hour   Intake 4120 ml   Output 850 ml   Net 3270 ml       Physical Exam:     Physical Exam   Constitutional: He is oriented to person, place, and time  He appears well-developed and well-nourished  No distress  HENT:   Head: Normocephalic and atraumatic  Right Ear: External ear normal    Left Ear: External ear normal    Nose: Nose normal    Eyes: Pupils are equal, round, and reactive to light  Conjunctivae and EOM are normal  Right eye exhibits no discharge  Left eye exhibits no discharge  No scleral icterus  Neck: No JVD present  No tracheal deviation present  No thyromegaly present  Cardiovascular: Normal rate, regular rhythm, normal heart sounds and intact distal pulses  Exam reveals no gallop and no friction rub  No murmur heard  Pulmonary/Chest: Effort normal and breath sounds normal  No stridor  No respiratory distress  He has no wheezes  He has no rales  He exhibits no tenderness  Abdominal: Soft  Bowel sounds are normal  He exhibits no distension and no mass  There is no tenderness  There is no rebound and no guarding  Musculoskeletal: He exhibits no edema, tenderness or deformity  Right great toe covered in dressing   Lymphadenopathy:     He has no cervical adenopathy  Neurological: He is alert and oriented to person, place, and time  Skin: Capillary refill takes less than 2 seconds  No rash noted  He is not diaphoretic  No erythema  No pallor  Psychiatric: He has a normal mood and affect  His behavior is normal  Judgment and thought content normal    Nursing note and vitals reviewed        Additional Data:     Labs:    Results from last 7 days   Lab Units 06/27/20  0640   WBC Thousand/uL 10 00   HEMOGLOBIN g/dL 12 3*   HEMATOCRIT % 36 6*   PLATELETS Thousands/uL 395*   NEUTROS PCT % 38*   LYMPHS PCT % 47   MONOS PCT % 13*   EOS PCT % 1     Results from last 7 days   Lab Units 06/27/20  0640   SODIUM mmol/L 134   POTASSIUM mmol/L 3 8   CHLORIDE mmol/L 101 CO2 mmol/L 21   BUN mg/dL 5*   CREATININE mg/dL 1 17   ANION GAP mmol/L 12   CALCIUM mg/dL 8 0*   ALBUMIN g/dL 2 6*   TOTAL BILIRUBIN mg/dL 0 50   ALK PHOS U/L 166*   ALT U/L 33   AST U/L 35   GLUCOSE RANDOM mg/dL 352*     Results from last 7 days   Lab Units 06/27/20  0641   INR  2 02*     Results from last 7 days   Lab Units 06/28/20  0607 06/28/20  0316 06/27/20  2309 06/27/20 2008 06/27/20  1546 06/27/20  1121 06/27/20  0924   POC GLUCOSE mg/dl 93 112 83 105 80 261* 273*     Results from last 7 days   Lab Units 06/27/20  0640   HEMOGLOBIN A1C % 8 6*     Results from last 7 days   Lab Units 06/27/20 2015 06/27/20  1432 06/27/20  0641 06/27/20  0640   LACTIC ACID mmol/L 1 6 2 8* 2 9*  --    PROCALCITONIN ng/ml  --   --   --  0 20           * I Have Reviewed All Lab Data Listed Above  * Additional Pertinent Lab Tests Reviewed: Lisa 66 Admission Reviewed    Imaging:    Imaging Reports Reviewed Today Include: None  Imaging Personally Reviewed by Myself Includes:  None    Recent Cultures (last 7 days):     Results from last 7 days   Lab Units 06/27/20  0640   BLOOD CULTURE  Received in Microbiology Lab  Culture in Progress  Received in Microbiology Lab  Culture in Progress         Last 24 Hours Medication List:     Current Facility-Administered Medications:  acetaminophen 650 mg Oral Q6H PRN Jessica Mai MD    Or        HYDROcodone-acetaminophen 1 tablet Oral Q6H PRN Jessica Mai MD    Or        HYDROcodone-acetaminophen 2 tablet Oral Q6H PRN Jessica Mai MD    aluminum-magnesium hydroxide-simethicone 30 mL Oral Q6H PRN Jessica Mai MD    atorvastatin 40 mg Oral Daily With Vicki Bess MD    busPIRone 30 mg Oral BID Jessica Mai MD    cefepime 2,000 mg Intravenous Q12H Jessica Mai MD Last Rate: 2,000 mg (06/27/20 1955)   cyanocobalamin 100 mcg Oral Daily Jessica Mai MD    docusate sodium 100 mg Oral BID PRN Carlos Smoker Siva Bell MD    enoxaparin 40 mg Subcutaneous Daily Yovany Llanos MD    folic acid 1 mg Oral Daily Yovany Llanos MD    insulin lispro 1-6 Units Subcutaneous TID [facility] Yovany Llanos MD    insulin lispro 1-6 Units Subcutaneous HS Yovany Llanos MD    lamoTRIgine 25 mg Oral Daily Yovany Llanos MD    levETIRAcetam 500 mg Oral Q12H [address] Yovany Llanos MD    melatonin 6 mg Oral HS Yovany Llanos MD    metoprolol tartrate 25 mg Oral Q12H [address] Yovany Llanos MD    mirtazapine 45 mg Oral HS Yovany Llanos MD    ondansetron 4 mg Intravenous Q4H PRN Yovany Llanos MD    pantoprazole 40 mg Oral Early Morning Yovany Llanos MD    sertraline 100 mg Oral BID Yovany Llanos MD    thiamine 100 mg Oral Daily Yovany Llanos MD    vancomycin 17 5 mg/kg Intravenous Q12H Yovany Llanos MD Last Rate: 1,250 mg (06/27/20 2322)        Today, Patient Was Seen By: Yovany Llanos MD    ** Please Note: Dictation voice to text software may have been used in the creation of this document   **

## 2020-06-28 NOTE — OCCUPATIONAL THERAPY NOTE
Occupational Therapy Evaluation      Tracy Bravo    6/28/2020    Patient Active Problem List   Diagnosis    Alcoholic ketoacidosis    Type 2 diabetes mellitus with diabetic polyneuropathy, without long-term current use of insulin (HCC)    Chronic alcohol abuse    [facility]IE [facility]) [facility]    Gastrointestinal hemorrhage with hematemesis    Diabetic ulcer of toe of right foot associated with type 2 diabetes mellitus ([facility] )    Sepsis, unspecified organism ([facility] )    Seizure disorder ([facility] )    PTSD (post-traumatic stress disorder)       Past Medical History:   Diagnosis Date    Alcoholic hepatitis     Anxiety     Chronic liver disease     Depression     Diabetes ([facility]     Diabetic ulcer of toe of right foot associated with type 2 diabetes mellitus ([facility] ) 6/27/2020    Diabetic ulcer of toe of right foot associated with type 2 diabetes mellitus, with fat layer exposed ([facility] ) 6/27/2020    Iron deficiency anemia     Lactic acidosis 6/27/2020    Pancreatitis     PTSD (post-traumatic stress disorder)     Seizure ([facility]     Seizure disorder ([facility] ) 6/27/2020    Sepsis, unspecified organism ([facility] ) 6/27/2020    Type 2 diabetes mellitus with diabetic polyneuropathy, without long-term current use of insulin ([facility] ) 3/25/2019       Past Surgical History:   Procedure Laterality Date    ABDOMINAL SURGERY      CARDIAC CATHETERIZATION      PANCREATECTOMY      SPLENECTOMY      TONSILLECTOMY          06/28/20 0831   Note Type   Note type Eval only   Restrictions/Precautions   Weight Bearing Precautions Per Order No   Braces or Orthoses Other (Comment)  (Surgical shoe)   Other Precautions Fall Risk;Pain   Pain Assessment   Pain Assessment Tool 0-10   Pain Score 8   Pain Location/Orientation Location: Foot; Location: Leg;Location: Back; Location: Generalized   Pain Onset/Description Onset: Ongoing;Frequency: Constant/Continuous; Descriptor: Aching;Descriptor: Burning; Descriptor: Discomfort; Descriptor: Throbbing Hospital Pain Intervention(s) Repositioned; Ambulation/increased activity; Medication (See MAR)   Home Living   Type of [address] Multi-level;Bed/bath upstairs; Performs ADLs on one level; Able to live on main level with bedroom/bathroom; Access  (stair glide basement>1st floor and 1st>2nd floor )   Bathroom Shower/Tub Walk-in shower   Bathroom Toilet Raised   Bathroom Equipment Grab bars in shower;Built-in shower seat;Grab bars around toilet   [address] Walker;Cane;Wheelchair-manual   Prior Function   Level of [place] Independent with ADLs and functional mobility   Lives With Alone   ADL Assistance Independent   IADLs Independent   Falls in the last 6 months >10  ("1-2 times a day")   Comments Pt reports that he has been having increased difficulty with ADLs/IADLs and may warrant long-term SNF   Lifestyle   Autonomy Patient reporting being independent with ADLs/IADLs, ambulatory with RW or SPC and lives alone in a multi-level house    ADL   Eating Assistance 6  Modified independent   Grooming Assistance 6  Modified Independent   UB Bathing Assistance 5  Supervision/Setup   LB Bathing Assistance 4  Minimal Assistance   UB Dressing Assistance 5  Supervision/Setup    [facility] 3  Moderate [address]  3  Moderate Assistance   Bed Mobility   Supine to Sit 5  Supervision   Additional items Assist x 1;HOB elevated; Increased time required;Verbal cues   Sit to Supine 5  Supervision   Additional items Assist x 1;HOB elevated; Increased time required;Verbal cues   Transfers   Sit to Stand 4  Minimal assistance   Additional items Assist x 1; Increased time required;Verbal cues   Stand to Sit 4  Minimal assistance   Additional items Assist x 1; Increased time required;Verbal cues   Functional Mobility   Functional Mobility 4  Minimal assistance   Additional Comments Pt ambulated in room with no LOB or SOB noted      Additional items Rolling walker   Balance   Static Sitting Good   Dynamic Sitting Fair +   Static Standing Fair -   Dynamic Standing Poor +   Activity Tolerance   Activity Tolerance Patient limited by pain; Patient limited by fatigue   Nurse Made Aware RN made aware of outcomes    RUE Assessment   RUE Assessment WFL  (grossly 4-/5 MMT)   LUE Assessment   LUE Assessment WFL  (grossly 4-/5 MMT)   Hand Function   Gross Motor Coordination Functional   Fine Motor Coordination Functional   Cognition   Overall Cognitive Status WFL   Arousal/Participation Alert; Responsive   Attention Within functional limits   Orientation Level Oriented X4   Memory Within functional limits   Following Commands Follows one step commands without difficulty   Comments Pt agreeable to OT eval    Assessment   Limitation Decreased ADL status; Decreased UE strength;Decreased Safe judgement during ADL;Decreased endurance;Decreased self-care trans;Decreased high-level ADLs   Prognosis Fair   Assessment Pt is a 45 y o  male who was admitted to [address] [place] [address] on 6/27/2020 with Diabetic ulcer of toe of right foot associated with type 2 diabetes mellitus ([facility] )  At this time, patient is also affected by the comorbidities of: sepsis, seizure disorder, PTSD, and DM2  Additionally, patient is affected by the following personal factors:limited home support, difficulty performing ADLS and difficulty performing IADLS   Orders placed for OT evaluation/treatment with up with assistance orders  Prior to admission, Patient reporting being independent with ADLs/IADLs, ambulatory with RW or SPC and lives alone in a multi-level house  Upon OT evaluation, patient requires supervision/set-up for UB ADLs and minimum assist and moderate assist for LB ADLs  Occupational performance is affected by the following deficits: decreased strength, decreased balance, decreased tolerance, decreased safety awareness, increased pain and decreased coping skills   Based on the following information, patient would benefit from continued skilled OT treatment while in the hospital to address deficits and maximize level of functional independence with ADL's and functional mobility  Occupational Performance areas to address include: bathing/shower, toilet hygiene, dressing, medication management, functional mobility, clothing management, meal prep and household maintenance  From OT standpoint, recommendation at time of d/c would be Post-Acute Rehabilitation Services  Goals   Patient Goals to feel better and go home    Plan   Treatment Interventions ADL retraining;Functional transfer training;UE strengthening/ROM; Endurance training;Equipment evaluation/education; Compensatory technique education; Activityengagement   Goal Expiration Date 07/08/20   OT Treatment Day 0   OT Frequency 3-5x/wk   Recommendation   OT Discharge Recommendation Post-Acute Rehabilitation Services   OT - OK to Discharge Yes  (Once medically cleared)   Barthel Index   Feeding 10   Bathing 0   Grooming Score 5   Dressing Score 5   Bladder Score 10   Bowels Score 10   Toilet Use Score 5   Transfers (Bed/Chair) Score 5   Mobility (Level Surface) Score 0   Stairs Score 0   Barthel Index Score 50     GOALS:    *ADL transfers with (I) for inc'd independence with ADLs/purposeful tasks    *UB ADL with (I) for inc'd independence with self cares    *LB ADL with (I) using AE prn for inc'd independence with self cares    *Toileting with (I) for clothing management and hygiene for return to PLOF with personal care    *Increase static stand balance to good and dyn stand balance to fair+ for inc'd safety with standing purposeful tasks    *Increase stand tolerance x8 m for inc'd tolerance with standing purposeful tasks    *Participate in 10m UE therex to increase overall stamina/activity tolerance for purposeful tasks    *Bed mobility- (I) for inc'd independence to manage own comfort and initiate EOB & OOB purposeful tasks    *Patient will verbalize 3 safety awareness/ principles to prevent falls in the home setting  *Patient will increase OOB/sitting tolerance to 2-4 hours per day to increase participation in self-care and leisure tasks with no s/s of exertion           Darwin Plaza MS, OTR/L

## 2020-06-28 NOTE — PLAN OF CARE
Problem: PHYSICAL THERAPY ADULT  Goal: Performs mobility at highest level of function for planned discharge setting  See evaluation for individualized goals  Description  Treatment/Interventions: Functional transfer training, LE strengthening/ROM, Elevations, Therapeutic exercise, Endurance training, Bed mobility, Gait training  Equipment Recommended: [place], Wheelchair       See flowsheet documentation for full assessment, interventions and recommendations  Outcome: Progressing  Note:   Prognosis: Fair  Problem List: Decreased strength, Decreased endurance, Impaired balance, Decreased mobility, Decreased coordination, Pain  Assessment: Pt is 45 y o  male seen for PT evaluation s/p admit to [facility] on 6/27/2020 w/ Diabetic ulcer of toe of right foot associated with type 2 diabetes mellitus ([facility] )  PT consulted to assess pt's functional mobility and d/c needs  Order placed for PT eval and tx, w/ up as tolerated order  Comorbidities affecting pt's physical performance at time of assessment include: peripheral neuropathy, anxiety and depression  PTA, pt was independent w/ all functional mobility w/ RW/cane, ambulates household distances, lives alone in multi level house and on disability  Personal factors affecting pt at time of IE include: lives in multi story house, ambulating w/ assistive device, positive fall history, anxiety and depression  Please find objective findings from PT assessment regarding body systems outlined above with impairments and limitations including weakness, impaired balance, decreased endurance, impaired coordination, gait deviations, pain, decreased activity tolerance, decreased functional mobility tolerance, decreased safety awareness and fall risk  Pt's clinical presentation is currently unstable/unpredictable seen in pt's presentation of frequent falls and wound   Pt to benefit from continued PT tx to address deficits as defined above and maximize level of functional independent mobility and consistency  From PT/mobility standpoint, recommendation at time of d/c would be STR pending progress in order to facilitate return to PLOF  Barriers to Discharge: Decreased caregiver support     PT Discharge Recommendation: Post-Acute Rehabilitation Services     PT - OK to Discharge: No    See flowsheet documentation for full assessment     Konstantin Bravo, PT

## 2020-06-28 NOTE — ASSESSMENT & PLAN NOTE
Lactic acidosis (2 9) at time of presentation, secondary to sepsis  Will treat infection with IV fluid rehydration and antibiotics  Lactic acidosis resolved with IV fluid rehydration  Will monitor serial labs and refine plan of care as necessary

## 2020-06-29 VITALS
HEART RATE: 88 BPM | OXYGEN SATURATION: 95 % | RESPIRATION RATE: 20 BRPM | BODY MASS INDEX: 21.43 KG/M2 | TEMPERATURE: 97.7 F | WEIGHT: 167 LBS | DIASTOLIC BLOOD PRESSURE: 59 MMHG | SYSTOLIC BLOOD PRESSURE: 98 MMHG | HEIGHT: 74 IN

## 2020-06-29 LAB
ALBUMIN SERPL BCP-MCNC: 1.7 G/DL (ref 3.5–5.7)
ALP SERPL-CCNC: 122 U/L (ref 40–150)
ALT SERPL W P-5'-P-CCNC: 31 U/L (ref 7–52)
ANION GAP SERPL CALCULATED.3IONS-SCNC: 8 MMOL/L (ref 4–13)
AST SERPL W P-5'-P-CCNC: 76 U/L (ref 13–39)
BASOPHILS # BLD AUTO: 0.1 THOUSANDS/ΜL (ref 0–0.1)
BASOPHILS NFR BLD AUTO: 1 % (ref 0–2)
BILIRUB SERPL-MCNC: 0.4 MG/DL (ref 0.2–1)
BUN SERPL-MCNC: 5 MG/DL (ref 7–25)
CALCIUM SERPL-MCNC: 7.5 MG/DL (ref 8.6–10.5)
CHLORIDE SERPL-SCNC: 110 MMOL/L (ref 98–107)
CO2 SERPL-SCNC: 24 MMOL/L (ref 21–31)
CREAT SERPL-MCNC: 0.63 MG/DL (ref 0.7–1.3)
EOSINOPHIL # BLD AUTO: 0.3 THOUSAND/ΜL (ref 0–0.61)
EOSINOPHIL NFR BLD AUTO: 4 % (ref 0–5)
ERYTHROCYTE [DISTWIDTH] IN BLOOD BY AUTOMATED COUNT: 12.6 % (ref 11.5–14.5)
GFR SERPL CREATININE-BSD FRML MDRD: 114 ML/MIN/1.73SQ M
GLUCOSE SERPL-MCNC: 217 MG/DL (ref 65–140)
GLUCOSE SERPL-MCNC: 231 MG/DL (ref 65–99)
GLUCOSE SERPL-MCNC: 261 MG/DL (ref 65–140)
HCT VFR BLD AUTO: 28.1 % (ref 42–47)
HGB BLD-MCNC: 9.7 G/DL (ref 14–18)
LYMPHOCYTES # BLD AUTO: 4 THOUSANDS/ΜL (ref 0.6–4.47)
LYMPHOCYTES NFR BLD AUTO: 48 % (ref 21–51)
MAGNESIUM SERPL-MCNC: 1.6 MG/DL (ref 1.9–2.7)
MCH RBC QN AUTO: 33.2 PG (ref 26–34)
MCHC RBC AUTO-ENTMCNC: 34.5 G/DL (ref 31–37)
MCV RBC AUTO: 96 FL (ref 81–99)
MONOCYTES # BLD AUTO: 1.1 THOUSAND/ΜL (ref 0.17–1.22)
MONOCYTES NFR BLD AUTO: 13 % (ref 2–12)
NEUTROPHILS # BLD AUTO: 3 THOUSANDS/ΜL (ref 1.4–6.5)
NEUTS SEG NFR BLD AUTO: 35 % (ref 42–75)
PHOSPHATE SERPL-MCNC: 2.2 MG/DL (ref 3–5.5)
PLATELET # BLD AUTO: 323 THOUSANDS/UL (ref 149–390)
PMV BLD AUTO: 9.3 FL (ref 8.6–11.7)
POTASSIUM SERPL-SCNC: 3.5 MMOL/L (ref 3.5–5.5)
PROT SERPL-MCNC: 4.7 G/DL (ref 6.4–8.9)
RBC # BLD AUTO: 2.93 MILLION/UL (ref 4.3–5.9)
SODIUM SERPL-SCNC: 142 MMOL/L (ref 134–143)
VANCOMYCIN TROUGH SERPL-MCNC: 17.4 UG/ML (ref 5–12)
WBC # BLD AUTO: 8.5 THOUSAND/UL (ref 4.8–10.8)

## 2020-06-29 PROCEDURE — 85025 COMPLETE CBC W/AUTO DIFF WBC: CPT | Performed by: FAMILY MEDICINE

## 2020-06-29 PROCEDURE — 97116 GAIT TRAINING THERAPY: CPT

## 2020-06-29 PROCEDURE — 82948 REAGENT STRIP/BLOOD GLUCOSE: CPT

## 2020-06-29 PROCEDURE — 80202 ASSAY OF VANCOMYCIN: CPT | Performed by: FAMILY MEDICINE

## 2020-06-29 PROCEDURE — 80053 COMPREHEN METABOLIC PANEL: CPT | Performed by: FAMILY MEDICINE

## 2020-06-29 PROCEDURE — 84100 ASSAY OF PHOSPHORUS: CPT | Performed by: FAMILY MEDICINE

## 2020-06-29 PROCEDURE — 97530 THERAPEUTIC ACTIVITIES: CPT

## 2020-06-29 PROCEDURE — 83735 ASSAY OF MAGNESIUM: CPT | Performed by: FAMILY MEDICINE

## 2020-06-29 PROCEDURE — 99239 HOSP IP/OBS DSCHRG MGMT >30: CPT | Performed by: INTERNAL MEDICINE

## 2020-06-29 PROCEDURE — 97110 THERAPEUTIC EXERCISES: CPT

## 2020-06-29 RX ORDER — MAGNESIUM SULFATE HEPTAHYDRATE 40 MG/ML
2 INJECTION, SOLUTION INTRAVENOUS ONCE
Status: COMPLETED | OUTPATIENT
Start: 2020-06-29 | End: 2020-06-29

## 2020-06-29 RX ORDER — DOXYCYCLINE 100 MG/1
100 TABLET ORAL 2 TIMES DAILY
Qty: 14 TABLET | Refills: 0 | Status: SHIPPED | OUTPATIENT
Start: 2020-06-29 | End: 2020-07-06

## 2020-06-29 RX ORDER — DOXYCYCLINE 100 MG/1
100 TABLET ORAL 2 TIMES DAILY
Qty: 14 TABLET | Refills: 0 | Status: SHIPPED | OUTPATIENT
Start: 2020-06-29 | End: 2020-06-29 | Stop reason: SDUPTHER

## 2020-06-29 RX ADMIN — INSULIN LISPRO 3 UNITS: 100 INJECTION, SOLUTION INTRAVENOUS; SUBCUTANEOUS at 11:50

## 2020-06-29 RX ADMIN — INSULIN LISPRO 2 UNITS: 100 INJECTION, SOLUTION INTRAVENOUS; SUBCUTANEOUS at 08:37

## 2020-06-29 RX ADMIN — THIAMINE HCL TAB 100 MG 100 MG: 100 TAB at 08:41

## 2020-06-29 RX ADMIN — CEFEPIME HYDROCHLORIDE 2000 MG: 2 INJECTION, SOLUTION INTRAVENOUS at 08:37

## 2020-06-29 RX ADMIN — ENOXAPARIN SODIUM 40 MG: 40 INJECTION SUBCUTANEOUS at 08:42

## 2020-06-29 RX ADMIN — POTASSIUM CHLORIDE 20 MEQ: 1500 TABLET, EXTENDED RELEASE ORAL at 08:41

## 2020-06-29 RX ADMIN — MAGNESIUM SULFATE IN WATER 2 G: 40 INJECTION, SOLUTION INTRAVENOUS at 12:54

## 2020-06-29 RX ADMIN — LAMOTRIGINE 25 MG: 25 TABLET ORAL at 08:38

## 2020-06-29 RX ADMIN — PANTOPRAZOLE SODIUM 40 MG: 40 TABLET, DELAYED RELEASE ORAL at 05:09

## 2020-06-29 RX ADMIN — MAGNESIUM GLUCONATE 500 MG ORAL TABLET 400 MG: 500 TABLET ORAL at 08:41

## 2020-06-29 RX ADMIN — LEVETIRACETAM 500 MG: 500 TABLET, FILM COATED ORAL at 08:38

## 2020-06-29 RX ADMIN — BUSPIRONE HYDROCHLORIDE 30 MG: 15 TABLET ORAL at 08:38

## 2020-06-29 RX ADMIN — VANCOMYCIN HYDROCHLORIDE 1250 MG: 1 INJECTION, POWDER, LYOPHILIZED, FOR SOLUTION INTRAVENOUS at 11:51

## 2020-06-29 RX ADMIN — SERTRALINE HYDROCHLORIDE 100 MG: 100 TABLET ORAL at 08:38

## 2020-06-29 RX ADMIN — Medication 100 MCG: at 08:41

## 2020-06-29 RX ADMIN — FOLIC ACID 1 MG: 1 TABLET ORAL at 08:38

## 2020-06-29 NOTE — OCCUPATIONAL THERAPY NOTE
[address]  Treatment Note     Patient Name: Meka [place]  Today's Date: 6/29/2020  Problem List  Principal Problem:    Diabetic ulcer of toe of right foot associated with type 2 diabetes mellitus ([facility] )  Active Problems:    Type 2 diabetes mellitus with diabetic polyneuropathy, without long-term current use of insulin (HCC)    Sepsis, unspecified organism ([facility] )    Seizure disorder ([place] [address] )    PTSD (post-traumatic stress disorder)          06/29/20 0931   Restrictions/Precautions   Weight Bearing Precautions Per Order No   Braces or Orthoses Other (Comment)  (Surgical shoe)   Other Precautions Fall Risk;Pain   Pain Assessment   Pain Assessment Tool 0-10   Pain Score 8   Pain Location/Orientation Location: Generalized   Pain Onset/Description Onset: Ongoing;Frequency: Constant/Continuous; Descriptor: Aching;Descriptor: Burning; Descriptor: Throbbing   Hospital Pain Intervention(s) Ambulation/increased activity;Repositioned;Medication (See MAR)   Bed Mobility   Supine to Sit 6  Modified independent   Additional items HOB elevated; Bedrails   Sit to Supine 6  Modified independent   Additional items Bedrails;HOB elevated   Transfers   Sit to Stand   (CGA)   Additional items Assist x 1;Bedrails;Verbal cues   Stand to Sit   (CGA)   Additional items Assist x 1;Verbal cues; Bedrails   Functional Mobility   Functional Mobility   (CGA)   Additional Comments Pt ambulated 150 feet in hallway with no LOB or SOB noted  Additional items Rolling walker   Therapeutic Exercise - ROM   UE-ROM Yes   ROM- Right Upper Extremities   R Shoulder AROM; Flexion; Extension  (Protraction/Retraction)   R Elbow AROM;Elbow flexion;Elbow extension   R Position Seated   R Weight/Reps/Sets 1 set x 30 reps each    ROM - Left Upper Extremities    L Shoulder AROM; Flexion; Extension  (Protraction/Retraction)   L Elbow AROM;Elbow flexion;Elbow extension   L Position Seated   L Weight/Reps/Sets 1 set x 30 reps each    Cognition   Overall Cognitive Status WFL   Arousal/Participation Alert; Responsive   Attention Within functional limits   Orientation Level Oriented X4   Memory Within functional limits   Following Commands Follows one step commands without difficulty   Comments Pt agreeable to OT session   Activity Tolerance   Activity Tolerance Patient limited by pain   Medical Staff Made Aware RN made aware of outcomes    Assessment   Assessment Patient participated in Skilled OT session this date with interventions consisting of therapeutic exercise to: increase functional use of BUEs, increase BUE muscle strength ,  therapeutic activities to: increase activity tolerance, increase dynamic sit/ stand balance during functional activity , increase postural control, increase trunk control and increase OOB/ sitting tolerance   Patient agreeable to OT treatment session, upon arrival patient was found supine in bed and in no apparent distress  In comparison to previous session, patient with improvements in endurance and balance  Patient requiring verbal cues for pacing thru activity steps and ocassional safety reminders  Patient continues to be functioning below baseline level, occupational performance remains limited secondary to factors listed above and increased risk for falls and injury  From OT standpoint, recommendation at time of d/c would be Home with skilled therapy  Patient to benefit from continued Occupational Therapy treatment while in the hospital to address deficits as defined above and maximize level of functional independence with ADLs and functional mobility  Plan   Treatment Interventions Functional transfer training;UE strengthening/ROM; Endurance training; Compensatory technique education   Goal Expiration Date 07/08/20   OT Treatment Day 1   OT Frequency 3-5x/wk   Recommendation   OT Discharge Recommendation Home with skilled therapy   Equipment Recommended   (Pt may benefit from [facility] for foot drop)   OT - OK to Discharge Yes  (Once medically cleared)     Eusebia Green, OT

## 2020-06-29 NOTE — PLAN OF CARE
Problem: OCCUPATIONAL THERAPY ADULT  Goal: Performs self-care activities at highest level of function for planned discharge setting  See evaluation for individualized goals  Description  Treatment Interventions: ADL retraining, Functional transfer training, UE strengthening/ROM, Endurance training, Equipment evaluation/education, Compensatory technique education, Activityengagement          See flowsheet documentation for full assessment, interventions and recommendations  Outcome: Progressing  Note:   Limitation: Decreased ADL status, Decreased UE strength, Decreased Safe judgement during ADL, Decreased endurance, Decreased self-care trans, Decreased high-level ADLs  Prognosis: Fair  Assessment: Patient participated in Skilled OT session this date with interventions consisting of therapeutic exercise to: increase functional use of BUEs, increase BUE muscle strength ,  therapeutic activities to: increase activity tolerance, increase dynamic sit/ stand balance during functional activity , increase postural control, increase trunk control and increase OOB/ sitting tolerance   Patient agreeable to OT treatment session, upon arrival patient was found supine in bed and in no apparent distress  In comparison to previous session, patient with improvements in endurance and balance  Patient requiring verbal cues for pacing thru activity steps and ocassional safety reminders  Patient continues to be functioning below baseline level, occupational performance remains limited secondary to factors listed above and increased risk for falls and injury  From OT standpoint, recommendation at time of d/c would be Home with skilled therapy  Patient to benefit from continued Occupational Therapy treatment while in the hospital to address deficits as defined above and maximize level of functional independence with ADLs and functional mobility        OT Discharge Recommendation: Home with skilled therapy  OT - OK to Discharge: Yes(Once medically cleared)     James Concepcion, OT

## 2020-06-29 NOTE — DISCHARGE SUMMARY
Discharge- Anjum Truong 1970, 45 y o  male MRN: [id number]    Unit/Bed#: -02 Encounter: [id number]    Primary Care Provider: No primary care provider on file  Date and time admitted to hospital: 6/27/2020  6:41 AM        * Diabetic ulcer of toe of right foot associated with type 2 diabetes mellitus ([facility] )  Assessment & Plan  · MRI done with no findings of abscess or osteomyelitis  · Podiatry input appreciated  · Will discharge on doxycycline per podiatry  · [facility]  · Continue home wound care      PTSD (post-traumatic stress disorder)  Assessment & Plan  Chronic stable condition  No evidence of acute exacerbation  Continue outpatient medication regimen  Seizure disorder [facility])  Assessment & Plan  Patient reports remote history of several seizures in 2007  Seizure-free since 2007 on Keppra 500 mg b i d  Continue outpatient medication regimen  Sepsis, unspecified organism [facility])  Assessment & Plan  Improving, will discharge on oral antibiotics  [facility]      Type 2 diabetes mellitus with diabetic polyneuropathy, without long-term current use of insulin ([facility] )  Assessment & Plan  Continue home diabetic regimen        Discharging Physician / Practitioner: Dorian Herzog MD  PCP: No primary care provider on file    Admission Date:   Admission Orders (From admission, onward)     Ordered        06/27/20 0745  Inpatient Admission (expected length of stay for this patient Order details is greater than two midnights)  Once                   Discharge Date: 06/29/20    Resolved Problems  Date Reviewed: 6/28/2020          Resolved    Lactic acidosis 6/28/2020     Resolved by  Radha Zurita MD          Consultations During Hospital Stay:  · Podiatry    Procedures Performed:   · None    Significant Findings / Test Results:   · Diabetic foot ulcer    Incidental Findings:   · None     Test Results Pending at Discharge (will require follow up): · None     Outpatient Tests Requested:  · Routine labs with PCP    Complications:     None    Reason for Admission:  Diabetic foot ulcer    Hospital Course:     Katiana Pedro is a 45 y o  male patient who originally presented to the hospital on 6/27/2020 due to foot wound  Patient admitted and started on IV antibiotics  Podiatry was consulted and recommended MRI  MRI with no findings of abscess or osteomyelitis  Podiatry recommended discharging patient on oral antibiotics  Doxycycline was prescribed  [facility]  Continue home local wound care  Advised to return if he had any worsening of symptoms  Please see above list of diagnoses and related plan for additional information  Condition at Discharge: stable     Discharge Day Visit / Exam:     Subjective:  No complaints today    Vitals: Blood Pressure: 98/59 (06/29/20 1502)  Pulse: 88 (06/29/20 1502)  Temperature: 97 7 °F (36 5 °C) (06/29/20 1502)  Temp Source: Temporal (06/29/20 1502)  Respirations: 20 (06/29/20 1502)  Height: 6' 2" (188 cm) (06/27/20 0846)  Weight - Scale: 76 1 kg (167 lb 12 3 oz) (06/27/20 0846)  SpO2: 95 % (06/29/20 1502)     Exam:   Physical Exam   Constitutional: No distress  Frail  male   HENT:   Head: Normocephalic and atraumatic  Eyes: Conjunctivae and EOM are normal    Neck: Normal range of motion  Neck supple  Cardiovascular: Normal rate and regular rhythm  Pulmonary/Chest: Effort normal  No respiratory distress  Abdominal: Soft  He exhibits no distension  There is no tenderness  Musculoskeletal: Normal range of motion  He exhibits no edema  Neurological: He is alert  No cranial nerve deficit  Skin: Skin is warm and dry     Right great toe with dressing in place       Discussion with Family:  Attempted to call patient's family with no answer at number provided in chart    Discharge instructions/Information to patient and family:   See after visit summary for information provided to patient and family  Provisions for Follow-Up Care:  See after visit summary for information related to follow-up care and any pertinent home health orders  Disposition:     Home with [facility] Services (Reminder: Complete face to face encounter)    For Discharges to [facility] SNF:   · Not Applicable to this Patient - Not Applicable to this Patient    Planned Readmission:    no     Discharge Statement:  I spent 35 minutes discharging the patient  This time was spent on the day of discharge  I had direct contact with the patient on the day of discharge  Greater than 50% of the total time was spent examining patient, answering all patient questions, arranging and discussing plan of care with patient as well as directly providing post-discharge instructions  Additional time then spent on discharge activities  Discharge Medications:  See after visit summary for reconciled discharge medications provided to patient and family        ** Please Note: This note has been constructed using a voice recognition system **

## 2020-06-29 NOTE — ASSESSMENT & PLAN NOTE
· MRI done with no findings of abscess or osteomyelitis  · Podiatry input appreciated  · Will discharge on doxycycline per podiatry  · [facility]  · Continue home wound care

## 2020-06-29 NOTE — PLAN OF CARE
Problem: Potential for Falls  Goal: Patient will remain free of falls  Description  INTERVENTIONS:  - Assess patient frequently for physical needs  -  Identify cognitive and physical deficits and behaviors that affect risk of falls  -  [place] fall precautions as indicated by assessment   - Educate patient/family on patient safety including physical limitations  - Instruct patient to call for assistance with activity based on assessment  - Modify environment to reduce risk of injury  - Consider OT/PT consult to assist with strengthening/mobility  6/29/2020 1530 by Dasha Rosa  Outcome: Adequate for Discharge  6/29/2020 0831 by Dasha Rosa  Outcome: Progressing     Problem: Nutrition/Hydration-ADULT  Goal: Nutrient/Hydration intake appropriate for improving, restoring or maintaining nutritional needs  Description  Monitor and assess patient's nutrition/hydration status for malnutrition  Collaborate with interdisciplinary team and initiate plan and interventions as ordered  Monitor patient's weight and dietary intake as ordered or per policy  Utilize nutrition screening tool and intervene as necessary  Determine patient's food preferences and provide high-protein, high-caloric foods as appropriate       INTERVENTIONS:  - Monitor oral intake, urinary output, labs, and treatment plans  - Assess nutrition and hydration status and recommend course of action  - Evaluate amount of meals eaten  - Assist patient with eating if necessary   - Allow adequate time for meals  - Recommend/ encourage appropriate diets, oral nutritional supplements, and vitamin/mineral supplements  - Order, calculate, and assess calorie counts as needed  - Assess need for intravenous fluids  - Provide specific nutrition/hydration education as appropriate  - Include patient/family/caregiver in decisions related to nutrition   6/29/2020 1530 by Dasha Rosa  Outcome: Adequate for Discharge  6/29/2020 0831 by Muriel Sandoval Cody  Outcome: Progressing     Problem: PAIN - ADULT  Goal: Verbalizes/displays adequate comfort level or baseline comfort level  Description  Interventions:  - Encourage patient to monitor pain and request assistance  - Assess pain using appropriate pain scale  - Administer analgesics based on type and severity of pain and evaluate response  - Implement non-pharmacological measures as appropriate and evaluate response  - Consider cultural and social influences on pain and pain management  - Notify physician/advanced practitioner if interventions unsuccessful or patient reports new pain  6/29/2020 1530 by Daun Decree  Outcome: Adequate for Discharge  6/29/2020 0831 by Daun Decree  Outcome: Progressing     Problem: INFECTION - ADULT  Goal: Absence or prevention of progression during hospitalization  Description  INTERVENTIONS:  - Assess and monitor for signs and symptoms of infection  - Monitor lab/diagnostic results  - Monitor all insertion sites, i e  indwelling lines, tubes, and drains  - Monitor endotracheal if appropriate and nasal secretions for changes in amount and color  - [place] appropriate cooling/warming therapies per order  - Administer medications as ordered  - Instruct and encourage patient and family to use good hand hygiene technique     6/29/2020 1530 by Daun Decree  Outcome: Adequate for Discharge  6/29/2020 0831 by Daun Decree  Outcome: Progressing  Goal: Absence of fever/infection during neutropenic period  Description  INTERVENTIONS:  - Monitor WBC    6/29/2020 1530 by Daun Decree  Outcome: Adequate for Discharge  6/29/2020 0831 by Daun Decree  Outcome: Progressing     Problem: SAFETY ADULT  Goal: Maintain or return to baseline ADL function  Description  INTERVENTIONS:  -  Assess patient's ability to carry out ADLs; assess patient's baseline for ADL function and identify physical deficits which impact ability to perform ADLs (bathing, care of mouth/teeth, toileting, grooming, dressing, etc )  - Assess/evaluate cause of self-care deficits   - Assess range of motion  - Assess patient's mobility; develop plan if impaired  - Assess patient's need for assistive devices and provide as appropriate  - Encourage maximum independence but intervene and supervise when necessary  - Involve family in performance of ADLs  - Assess for home care needs following discharge   - Consider OT consult to assist with ADL evaluation and planning for discharge  - Provide patient education as appropriate  6/29/2020 1530 by [facility]  Outcome: Adequate for Discharge  6/29/2020 0831 by [facility]  Outcome: Progressing  Goal: Maintain or return mobility status to optimal level  Description  INTERVENTIONS:  - Assess patient's baseline mobility status (ambulation, transfers, stairs, etc )    - Identify cognitive and physical deficits and behaviors that affect mobility  - Identify mobility aids required to assist with transfers and/or ambulation (gait belt, sit-to-stand, lift, walker, cane, etc )  - [place] fall precautions as indicated by assessment  - Record patient progress and toleration of activity level on Mobility SBAR; progress patient to next Phase/Stage  - Instruct patient to call for assistance with activity based on assessment  - Consider rehabilitation consult to assist with strengthening/weightbearing, etc   6/29/2020 1530 by [facility]  Outcome: Adequate for Discharge  6/29/2020 0831 by [facility]  Outcome: Progressing     Problem: DISCHARGE PLANNING  Goal: Discharge to home or other facility with appropriate resources  Description  INTERVENTIONS:  - Identify barriers to discharge w/patient and caregiver  - Arrange for needed discharge resources and transportation as appropriate  - Identify discharge learning needs (meds, wound care, etc )  - Refer to Case Management Department for coordinating discharge planning if the patient needs post-hospital services based on physician/advanced practitioner order or complex needs related to functional status, cognitive ability, or social support system   6/29/2020 1530 by Debbora Sacks  Outcome: Adequate for Discharge  6/29/2020 0831 by Debbora Sacks  Outcome: Progressing     Problem: Knowledge Deficit  Goal: Patient/family/caregiver demonstrates understanding of disease process, treatment plan, medications, and discharge instructions  Description  Complete learning assessment and assess knowledge base    Interventions:  - Provide teaching at level of understanding  - Provide teaching via preferred learning methods  6/29/2020 1530 by Debbora Sacks  Outcome: Adequate for Discharge  6/29/2020 0831 by Debbora Sacks  Outcome: Progressing

## 2020-06-29 NOTE — CONSULTS
Consultation - [address] Javad 45 y o  male MRN: [id number]  Unit/Bed#: -02 Encounter: [id number]    Assessment/Plan     Assessment:  Right dorsal H IPJ wound full-thickness  Resolving cellulitis right H IPJ  Plan:  Patient is discharged at this time with recommendation for doxycycline x1 week and [facility]  Discussed with the patient that MRI findings without evidence of osteomyelitis at this time  DuoDerm applied    History of Present Illness   HPI:  Everett Cheney is a 45 y o  male who presents with a wound located at right dorsal H IPJ  The wound has been present for 2week(s)  The wound is present as a result of a rub from the shoe  He relates that he does have neuropathy and has no feeling in the foot  He states that he would have a very difficult time treating wound as he would not be able to do dressing changes  Consults    Review of Systems   Skin: Positive for color change and wound         Historical Information   Past Medical History:   Diagnosis Date    Alcoholic hepatitis     Anxiety     Chronic liver disease     Depression     Diabetes ([facility] [address] [facility]     Diabetic ulcer of toe of right foot associated with type 2 [facility] mellitus [facility] 6/27/2020    Diabetic ulcer of toe of right foot associated with type 2 diabetes mellitus, with fat layer exposed ([facility] [address] [facility] 6/27/2020    Iron deficiency anemia     Lactic acidosis 6/27/2020    Pancreatitis     PTSD (post-traumatic stress disorder)     Seizure ([facility] [address] [facility]     Seizure disorder ([facility] [address] ) 6/27/2020    Sepsis, unspecified organism ([facility] ) 6/27/2020    Type 2 diabetes mellitus with diabetic polyneuropathy, without long-term current use of insulin ([facility] ) 3/25/2019     Past Surgical History:   Procedure Laterality Date    ABDOMINAL SURGERY      CARDIAC CATHETERIZATION      PANCREATECTOMY      SPLENECTOMY      TONSILLECTOMY       Social History   Social History     Substance and Sexual Activity   Alcohol Use Yes    Alcohol/week: 15 0 standard drinks    Types: 15 Cans of beer per week    Comment: 15 cans beer daily     Social History     Substance and Sexual Activity   Drug Use Never     E-Cigarette/Vaping    E-Cigarette Use Never User      E-Cigarette/Vaping Substances     Social History     Tobacco Use   Smoking Status Never Smoker   Smokeless Tobacco Never Used     Family History: non-contributory    Meds/Allergies   current meds:   Current Facility-Administered Medications   Medication Dose Route Frequency    acetaminophen (TYLENOL) tablet 650 mg  650 mg Oral Q6H PRN    Or    HYDROcodone-acetaminophen (NORCO) 5-325 mg per tablet 1 tablet  1 tablet Oral Q6H PRN    Or    HYDROcodone-acetaminophen (NORCO) 5-325 mg per tablet 2 tablet  2 tablet Oral Q6H PRN    aluminum-magnesium hydroxide-simethicone (MYLANTA) 200-200-20 mg/5 mL oral suspension 30 mL  30 mL Oral Q6H PRN    atorvastatin (LIPITOR) tablet 40 mg  40 mg Oral Daily With Dinner    busPIRone (BUSPAR) tablet 30 mg  30 mg Oral BID    cefepime (MAXIPIME) IVPB (premix) 2,000 mg 50 mL  2,000 mg Intravenous Q12H    cyanocobalamin (VITAMIN B-12) tablet 100 mcg  100 mcg Oral Daily    docusate sodium (COLACE) capsule 100 mg  100 mg Oral BID PRN    enoxaparin (LOVENOX) subcutaneous injection 40 mg  40 mg Subcutaneous Daily    folic acid (FOLVITE) tablet 1 mg  1 mg Oral Daily    insulin lispro (HumaLOG) 100 units/mL subcutaneous injection 1-6 Units  1-6 Units Subcutaneous TID AC    insulin lispro (HumaLOG) 100 units/mL subcutaneous injection 1-6 Units  1-6 Units Subcutaneous HS    lamoTRIgine (LaMICtal) tablet 25 mg  25 mg Oral Daily    levETIRAcetam (KEPPRA) tablet 500 mg  500 mg Oral Q12H [facility]    magnesium oxide (MAG-OX) tablet 400 mg  400 mg Oral BID    magnesium sulfate 2 g/50 mL IVPB (premix) 2 g  2 g Intravenous Once    melatonin tablet 6 mg  6 mg Oral HS    metoprolol tartrate (LOPRESSOR) tablet 25 mg  25 mg Oral Q12H [facility]    mirtazapine (REMERON) tablet 45 mg  45 mg Oral HS    ondansetron (ZOFRAN) injection 4 mg  4 mg Intravenous Q4H PRN    pantoprazole (PROTONIX) EC tablet 40 mg  40 mg Oral Early Morning    potassium chloride (K-DUR,KLOR-CON) CR tablet 20 mEq  20 mEq Oral BID    sertraline (ZOLOFT) tablet 100 mg  100 mg Oral BID    thiamine (VITAMIN B1) tablet 100 mg  100 mg Oral Daily    vancomycin (VANCOCIN) 1,250 mg in sodium chloride 0 9 % 250 mL IVPB  17 5 mg/kg Intravenous Q12H     No Known Allergies    Objective   Vitals: Blood pressure 106/82, pulse 98, temperature 98 °F (36 7 °C), temperature source Temporal, resp  rate 18, height 6' 2" (1 88 m), weight 76 1 kg (167 lb 12 3 oz), SpO2 96 %  Wounds:     Wound 06/27/20 Pressure Injury Foot Right (Active)   Wound Description Clean;Dry; Intact 6/29/2020  8:17 AM   [place]-wound Assessment Pink 6/29/2020  8:17 AM   Drainage Amount None 6/29/2020  8:17 AM   Dressing Xeroform;Dry dressing 6/29/2020  8:17 AM   Dressing Status Clean;Dry; Intact 6/29/2020  8:17 AM         Physical Exam   Constitutional: He is cooperative  Cardiovascular:   Pulses:       Dorsalis pedis pulses are 2+ on the right side  Posterior tibial pulses are 0 on the right side  Musculoskeletal: He exhibits no edema or tenderness  Feet:   Right Foot:   Skin Integrity: Positive for ulcer, skin breakdown and erythema  Neurological: He is alert  A sensory deficit is present  Skin: Capillary refill takes 2 to 3 seconds  There is erythema  Wound appears full-thickness with 100% fibrous base well adherent  No active drainage  Erythema is approximately 0 5 cm periwound       Lab Results: I have personally reviewed pertinent reports  Imaging: I have personally reviewed pertinent films in PACS  EKG, Pathology, and Other Studies: I have personally reviewed pertinent reports        Code Status: Level 1 - Full Code  Advance Directive and Living Will:      Power of :    POLST:      Counseling / Coordination of Care  Total floor / unit time spent today 25 minutes  Greater than 50% of total time was spent with the patient and / or family counseling and / or coordination of care  A description of the counseling / coordination of care: Advised patient of care upon discharge  He understands that he needs to follow-up with his [place] provider  He states that he has an appointment next week  Reviewed plan of care with hospitalist service

## 2020-06-29 NOTE — ASSESSMENT & PLAN NOTE
Patient reports remote history of several seizures in 2007  Seizure-free since 2007 on Keppra 500 mg b i d  Continue outpatient medication regimen  Hemostasis: [facility]

## 2020-06-29 NOTE — SOCIAL WORK
LOS: 2, URR: 20, not a re-admit  CM met with pt at bedside and explained role  Pt reports he lives alone in a 2 story house; stays on 1st floor  He has a chair lift from basement to 1st floor and 1st fl to 2nd fl if needed  Pt uses cane to ambulate  RW recommended by therapy but pt declines need for same  Pt is able to drive short distances  He mostly relies on family for transportation  Pt reports his PCP is through [facility] but he is unsure of the name  His medications are mail delivered through the [place]  Pt reports history of HHC [facility]  He denies any history of STR  Pt reports history of alcohol abuse  He reports history of inpatient D&A rehab through the [place] in 2007, no treatment since that time  He denies current alcohol use  Drug abuse was also denied  Pt also has history of Anxiety, Depression, PTSD and Panic Attacks  Pt states he does have a psychiatrist through the [place] but again does not know the name  A post acute care recommendation was made by your care team for [address]  Discussed Freedom of Choice with patient  List of agencies given to patient via in person  patient aware the list is custom filtered for them by preferred and that [facility] post acute providers are designated  Pt chose [facility] as he has had them before and know they contract with [facility]  Referral sent  Pt states he drove here and would like to drive himself home  Pt for discharge home today per Dr Lisa Laureano  Pt aware and in agreement  Patient/caregiver received discharge checklist   Content reviewed  Patient/caregiver encouraged to participate in discharge plan of care prior to discharge home  CM reviewed d/c planning process including the following: identifying help at home, patient preference for d/c planning needs, availability of treatment team to discuss questions or concerns patient and/or family may have regarding understanding medications and recognizing signs and symptoms once discharged   CM also encouraged patient to follow up with all recommended appointments after discharge  Patient advised of importance for patient and family to participate in managing patients medical well being

## 2020-06-29 NOTE — PLAN OF CARE
Problem: Potential for Falls  Goal: Patient will remain free of falls  Description  INTERVENTIONS:  - Assess patient frequently for physical needs  -  Identify cognitive and physical deficits and behaviors that affect risk of falls  -  [place] fall precautions as indicated by assessment   - Educate patient/family on patient safety including physical limitations  - Instruct patient to call for assistance with activity based on assessment  - Modify environment to reduce risk of injury  - Consider OT/PT consult to assist with strengthening/mobility  Outcome: Progressing     Problem: Nutrition/Hydration-ADULT  Goal: Nutrient/Hydration intake appropriate for improving, restoring or maintaining nutritional needs  Description  Monitor and assess patient's nutrition/hydration status for malnutrition  Collaborate with interdisciplinary team and initiate plan and interventions as ordered  Monitor patient's weight and dietary intake as ordered or per policy  Utilize nutrition screening tool and intervene as necessary  Determine patient's food preferences and provide high-protein, high-caloric foods as appropriate       INTERVENTIONS:  - Monitor oral intake, urinary output, labs, and treatment plans  - Assess nutrition and hydration status and recommend course of action  - Evaluate amount of meals eaten  - Assist patient with eating if necessary   - Allow adequate time for meals  - Recommend/ encourage appropriate diets, oral nutritional supplements, and vitamin/mineral supplements  - Order, calculate, and assess calorie counts as needed  - Assess need for intravenous fluids  - Provide specific nutrition/hydration education as appropriate  - Include patient/family/caregiver in decisions related to nutrition   Outcome: Progressing     Problem: PAIN - ADULT  Goal: Verbalizes/displays adequate comfort level or baseline comfort level  Description  Interventions:  - Encourage patient to monitor pain and request assistance  - Assess pain using appropriate pain scale  - Administer analgesics based on type and severity of pain and evaluate response  - Implement non-pharmacological measures as appropriate and evaluate response  - Consider cultural and social influences on pain and pain management  - Notify physician/advanced practitioner if interventions unsuccessful or patient reports new pain  Outcome: Progressing     Problem: INFECTION - ADULT  Goal: Absence or prevention of progression during hospitalization  Description  INTERVENTIONS:  - Assess and monitor for signs and symptoms of infection  - Monitor lab/diagnostic results  - Monitor all insertion sites, i e  indwelling lines, tubes, and drains  - Monitor endotracheal if appropriate and nasal secretions for changes in amount and color  - [place] appropriate cooling/warming therapies per order  - Administer medications as ordered  - Instruct and encourage patient and family to use good hand hygiene technique     Outcome: Progressing  Goal: Absence of fever/infection during neutropenic period  Description  INTERVENTIONS:  - Monitor WBC    Outcome: Progressing     Problem: SAFETY ADULT  Goal: Maintain or return to baseline ADL function  Description  INTERVENTIONS:  -  Assess patient's ability to carry out ADLs; assess patient's baseline for ADL function and identify physical deficits which impact ability to perform ADLs (bathing, care of mouth/teeth, toileting, grooming, dressing, etc )  - Assess/evaluate cause of self-care deficits   - Assess range of motion  - Assess patient's mobility; develop plan if impaired  - Assess patient's need for assistive devices and provide as appropriate  - Encourage maximum independence but intervene and supervise when necessary  - Involve family in performance of ADLs  - Assess for home care needs following discharge   - Consider OT consult to assist with ADL evaluation and planning for discharge  - Provide patient education as appropriate  Outcome: Progressing  Goal: Maintain or return mobility status to optimal level  Description  INTERVENTIONS:  - Assess patient's baseline mobility status (ambulation, transfers, stairs, etc )    - Identify cognitive and physical deficits and behaviors that affect mobility  - Identify mobility aids required to assist with transfers and/or ambulation (gait belt, sit-to-stand, lift, walker, cane, etc )  - [place] fall precautions as indicated by assessment  - Record patient progress and toleration of activity level on Mobility SBAR; progress patient to next Phase/Stage  - Instruct patient to call for assistance with activity based on assessment  - Consider rehabilitation consult to assist with strengthening/weightbearing, etc   Outcome: Progressing     Problem: DISCHARGE PLANNING  Goal: Discharge to home or other facility with appropriate resources  Description  INTERVENTIONS:  - Identify barriers to discharge w/patient and caregiver  - Arrange for needed discharge resources and transportation as appropriate  - Identify discharge learning needs (meds, wound care, etc )  - Refer to Case Management Department for coordinating discharge planning if the patient needs post-hospital services based on physician/advanced practitioner order or complex needs related to functional status, cognitive ability, or social support system   Outcome: Progressing     Problem: Knowledge Deficit  Goal: Patient/family/caregiver demonstrates understanding of disease process, treatment plan, medications, and discharge instructions  Description  Complete learning assessment and assess knowledge base    Interventions:  - Provide teaching at level of understanding  - Provide teaching via preferred learning methods  Outcome: Progressing

## 2020-06-29 NOTE — PHYSICAL THERAPY NOTE
06/29/20 0951   Pain Assessment   Pain Assessment Tool 0-10   Pain Score 8   Pain Location/Orientation Location: Generalized   Pain Onset/Description Onset: Ongoing   Patient's Stated Pain Goal No pain   Hospital Pain Intervention(s) Ambulation/increased activity;Repositioned   Restrictions/Precautions   Weight Bearing Precautions Per Order No   Other Precautions Fall Risk;Pain   General   Chart Reviewed Yes   Response to Previous Treatment Patient with no complaints from previous session  Family/Caregiver Present No   Cognition   Overall Cognitive Status WFL   Arousal/Participation Alert; Cooperative   Attention Within functional limits   Orientation Level Oriented X4   Memory Within functional limits   Following Commands Follows one step commands without difficulty   Subjective   Subjective "I fall because I trip over my toes, I can't feel from the knee down "   Bed Mobility   Rolling R 6  Modified independent   Additional items Bedrails;HOB elevated   Rolling L 6  Modified independent   Additional items HOB elevated; Bedrails   Supine to Sit 6  Modified independent   Additional items Bedrails;HOB elevated   Sit to Supine 6  Modified independent   Additional items Bedrails;HOB elevated   Transfers   Sit to Stand   (CGA)   Additional items Assist x 1;Bedrails;Verbal cues   Stand to Sit   (CGA)   Additional items Assist x 1;Verbal cues; Bedrails   Ambulation/Elevation   Gait pattern Improper Weight shift;Decreased foot clearance;Narrow SUZANNE;Steppage;R Foot drag;L Foot drag   Gait Assistance   (CGA)   Additional items Assist x 1;Verbal cues; Tactile cues   Assistive Device Rolling walker   Distance 200 ft   Balance   Static Sitting Good   Dynamic Sitting Fair +   Static Standing Fair -   Dynamic Standing Poor +   Ambulatory Poor +   Endurance Deficit   Endurance Deficit Yes   Activity Tolerance   Activity Tolerance Patient limited by fatigue;Patient limited by pain   Exercises   Quad Sets Sitting;25 reps;AROM; Bilateral   Heelslides Sitting;25 reps;AROM; Bilateral   Glute Sets Sitting;25 reps;AROM; Bilateral   Hip Flexion Sitting;25 reps;AROM; Bilateral   Hip Abduction Sitting;25 reps;AROM; Bilateral   Hip Adduction Sitting;25 reps;AROM; Bilateral   Knee AROM Sitting;25 reps;AROM; Bilateral   Knee AROM Long Arc Quad Sitting;25 reps;AROM; Bilateral   Ankle Pumps Sitting;25 reps;Bilateral;PROM   Assessment   Prognosis Fair   Problem List Decreased strength;Decreased endurance; Impaired balance;Decreased mobility; Decreased coordination;Pain; Impaired sensation   Assessment Pt  found resting in bed but willing to participate in PT treatment  Pt  performed bed mobility tasks including rolling from side to side with MI and use of  BSR's to assist   Pt  then transfered from supine to sit with MI and BSR  Once at EOB pt  was able to maintain I sitting balance  Pt  then performed ther ex as per treatment flow sheet without difficulty except B foot drop and weak anterior tibialis  Pt  was unable to perform ankle pumps actively on BLE  Pt  then transfered from sit to stand with CGAx1 with RW and ambulated 200 ft with RW and CGAx1 with OT assisting to drag IV pole  Pt  presented with steppage gait abnormality due to B foot drop which patient stated that is the cause of most of his falls, tripping over his toes  Pt  questioned if he ever used braces (MAFO) to help and he stated that he will talk to the [place] about the possibilty of getting them  Pt  was then assisted back to his room where he requested to get back into bed  Pt  reported pain level 8/10 on pain scale refering to pain as generalized or all over pain  Pt  transfered from sit to supine with MI and BSR  Pt  tolerated treatment well and stated that he would like to go home  Pt  would do well at home with HHPT to help improve his functional mobility VS  STR to improve his function  Pt   was positioned for comfort and SCD's were re-applied with all needs in reach  Continue current POC and progress as tolerated  Goals   Patient Goals to go home   PT Treatment Day 1   Plan   Treatment/Interventions Functional transfer training;LE strengthening/ROM; Therapeutic exercise; Endurance training;Patient/family training;Bed mobility;Gait training;OT   Progress Progressing toward goals   PT Frequency   (3-5x/wk)   Recommendation   PT Discharge Recommendation Home with skilled therapy  (VS  post acute rehab)   Equipment Recommended Shantelle Su; Wheelchair   PT - OK to Discharge Yes   Additional Comments when medically stable   Froylan Emmanuel, CLIVE

## 2020-06-29 NOTE — NURSING NOTE
Patient discharged to home with [facility]  IV removed with catheter tip intact, DSD and pressure applied  Patient expressed understanding of discharge instructions

## 2020-06-29 NOTE — PLAN OF CARE
Problem: PHYSICAL THERAPY ADULT  Goal: Performs mobility at highest level of function for planned discharge setting  See evaluation for individualized goals  Description  Treatment/Interventions: Functional transfer training, LE strengthening/ROM, Elevations, Therapeutic exercise, Endurance training, Bed mobility, Gait training  Equipment Recommended: Merlin Sovereign, Wheelchair       See flowsheet documentation for full assessment, interventions and recommendations  Outcome: Progressing  Note:   Prognosis: Fair  Problem List: Decreased strength, Decreased endurance, Impaired balance, Decreased mobility, Decreased coordination, Pain, Impaired sensation  Assessment: Pt  found resting in bed but willing to participate in PT treatment  Pt  performed bed mobility tasks including rolling from side to side with MI and use of  BSR's to assist   Pt  then transfered from supine to sit with MI and BSR  Once at EOB pt  was able to maintain I sitting balance  Pt  then performed ther ex as per treatment flow sheet without difficulty except B foot drop and weak anterior tibialis  Pt  was unable to perform ankle pumps actively on BLE  Pt  then transfered from sit to stand with CGAx1 with RW and ambulated 200 ft with RW and CGAx1 with OT assisting to drag IV pole  Pt  presented with steppage gait abnormality due to B foot drop which patient stated that is the cause of most of his falls, tripping over his toes  Pt  questioned if he ever used braces (MAFO) to help and he stated that he will talk to the [place] about the possibilty of getting them  Pt  was then assisted back to his room where he requested to get back into bed  Pt  reported pain level 8/10 on pain scale refering to pain as generalized or all over pain  Pt  transfered from sit to supine with MI and BSR  Pt  tolerated treatment well and stated that he would like to go home    Pt  would do well at home with HHPT to help improve his functional mobility VS  STR to improve his function  Pt   was positioned for comfort and SCD's were re-applied with all needs in reach  Continue current POC and progress as tolerated  Barriers to Discharge: Decreased caregiver support     PT Discharge Recommendation: Home with skilled therapy(VS  post acute rehab)     PT - OK to Discharge: Yes    See flowsheet documentation for full assessment     Froylan Emmanuel PTA

## 2020-06-29 NOTE — PROGRESS NOTES
Vancomycin Assessment    Kamille Huerta is a 45 y o  male who is currently receiving vancomycin 1250 mg IV q12 for skin-soft tissue infection     Relevant clinical data and objective history reviewed:  Creatinine   Date Value Ref Range Status   06/29/2020 0 63 (L) 0 70 - 1 30 mg/dL Final     Comment:     Standardized to IDMS reference method   06/28/2020 0 70 0 70 - 1 30 mg/dL Final     Comment:     Standardized to IDMS reference method   06/27/2020 1 17 0 70 - 1 30 mg/dL Final     Comment:     Standardized to IDMS reference method     /82   Pulse 98   Temp 98 °F (36 7 °C) (Temporal)   Resp 18   Ht 6' 2" (1 88 m)   Wt 76 1 kg (167 lb 12 3 oz)   SpO2 96%   BMI 21 54 kg/m²   I/O last 3 completed shifts: In: 240 [P O :240]  Out: 1500 [Urine:1500]  Lab Results   Component Value Date/Time    BUN 5 (L) 06/29/2020 04:56 AM    WBC 8 50 06/29/2020 04:56 AM    HGB 9 7 (L) 06/29/2020 04:56 AM    HCT 28 1 (L) 06/29/2020 04:56 AM    MCV 96 06/29/2020 04:56 AM     06/29/2020 04:56 AM     Temp Readings from Last 3 Encounters:   06/29/20 98 °F (36 7 °C) (Temporal)   03/29/19 98 2 °F (36 8 °C) (Temporal)   03/25/19 97 9 °F (36 6 °C)     Vancomycin Days of Therapy: 3    Assessment/Plan  The patient is currently on vancomycin utilizing scheduled dosing  The patient is receiving 1250 mg IV q12 with the most recent vancomycin level being at steady-state and therapeutic based on a goal of 15-20 (appropriate for most indications) ; therefore, is clinically appropriate and dose will be continued   Pharmacy will continue to follow closely for s/sx of nephrotoxicity, infusion reactions and appropriateness of therapy  BMP and CBC will be ordered per protocol  Plan for trough as patient approaches steady state, prior to the other  dose at approximately 1100 on 7/2/20  Pharmacy will continue to follow the patients culture results and clinical progress daily      Kelsie Sorto, Pharmacist

## 2020-07-02 LAB
BACTERIA BLD CULT: NORMAL
BACTERIA BLD CULT: NORMAL

## 2020-10-19 ENCOUNTER — TELEPHONE (OUTPATIENT)
Dept: LAB | Facility: HOSPITAL | Age: 50
End: 2020-10-19

## 2020-10-21 ENCOUNTER — TRANSCRIBE ORDERS (OUTPATIENT)
Dept: LAB | Facility: HOSPITAL | Age: 50
End: 2020-10-21

## 2020-10-21 DIAGNOSIS — M05.20 RHEUMATOID ARTERITIS (HCC): Primary | ICD-10-CM

## 2020-10-27 ENCOUNTER — APPOINTMENT (OUTPATIENT)
Dept: LAB | Facility: HOSPITAL | Age: 50
End: 2020-10-27

## 2020-10-27 DIAGNOSIS — M05.20 RHEUMATOID ARTERITIS (HCC): ICD-10-CM

## 2020-10-27 LAB
ALBUMIN SERPL BCP-MCNC: 2.6 G/DL (ref 3.5–5)
ALP SERPL-CCNC: 440 U/L (ref 46–116)
ALT SERPL W P-5'-P-CCNC: 40 U/L (ref 12–78)
ANION GAP SERPL CALCULATED.3IONS-SCNC: 2 MMOL/L (ref 4–13)
AST SERPL W P-5'-P-CCNC: 31 U/L (ref 5–45)
BASOPHILS # BLD AUTO: 0.01 THOUSANDS/ΜL (ref 0–0.1)
BASOPHILS NFR BLD AUTO: 0 % (ref 0–1)
BILIRUB SERPL-MCNC: 0.78 MG/DL (ref 0.2–1)
BUN SERPL-MCNC: 18 MG/DL (ref 5–25)
CALCIUM ALBUM COR SERPL-MCNC: 10 MG/DL (ref 8.3–10.1)
CALCIUM SERPL-MCNC: 8.9 MG/DL (ref 8.3–10.1)
CHLORIDE SERPL-SCNC: 103 MMOL/L (ref 100–108)
CK SERPL-CCNC: 93 U/L (ref 39–308)
CO2 SERPL-SCNC: 32 MMOL/L (ref 21–32)
CREAT SERPL-MCNC: 1.15 MG/DL (ref 0.6–1.3)
EOSINOPHIL # BLD AUTO: 0 THOUSAND/ΜL (ref 0–0.61)
EOSINOPHIL NFR BLD AUTO: 0 % (ref 0–6)
ERYTHROCYTE [DISTWIDTH] IN BLOOD BY AUTOMATED COUNT: 14 % (ref 11.6–15.1)
GFR SERPL CREATININE-BSD FRML MDRD: 73 ML/MIN/1.73SQ M
GLUCOSE P FAST SERPL-MCNC: 304 MG/DL (ref 65–99)
HCT VFR BLD AUTO: 39.2 % (ref 36.5–49.3)
HGB BLD-MCNC: 12.5 G/DL (ref 12–17)
IMM GRANULOCYTES # BLD AUTO: 0.08 THOUSAND/UL (ref 0–0.2)
IMM GRANULOCYTES NFR BLD AUTO: 1 % (ref 0–2)
LYMPHOCYTES # BLD AUTO: 2.72 THOUSANDS/ΜL (ref 0.6–4.47)
LYMPHOCYTES NFR BLD AUTO: 17 % (ref 14–44)
MCH RBC QN AUTO: 29.4 PG (ref 26.8–34.3)
MCHC RBC AUTO-ENTMCNC: 31.9 G/DL (ref 31.4–37.4)
MCV RBC AUTO: 92 FL (ref 82–98)
MONOCYTES # BLD AUTO: 0.74 THOUSAND/ΜL (ref 0.17–1.22)
MONOCYTES NFR BLD AUTO: 5 % (ref 4–12)
NEUTROPHILS # BLD AUTO: 12.55 THOUSANDS/ΜL (ref 1.85–7.62)
NEUTS SEG NFR BLD AUTO: 77 % (ref 43–75)
NRBC BLD AUTO-RTO: 0 /100 WBCS
PLATELET # BLD AUTO: 288 THOUSANDS/UL (ref 149–390)
PMV BLD AUTO: 11.6 FL (ref 8.9–12.7)
POTASSIUM SERPL-SCNC: 4.1 MMOL/L (ref 3.5–5.3)
PROT SERPL-MCNC: 7.6 G/DL (ref 6.4–8.2)
RBC # BLD AUTO: 4.25 MILLION/UL (ref 3.88–5.62)
SODIUM SERPL-SCNC: 137 MMOL/L (ref 136–145)
WBC # BLD AUTO: 16.1 THOUSAND/UL (ref 4.31–10.16)

## 2020-10-27 PROCEDURE — 83520 IMMUNOASSAY QUANT NOS NONAB: CPT

## 2020-10-27 PROCEDURE — 36415 COLL VENOUS BLD VENIPUNCTURE: CPT

## 2020-10-27 PROCEDURE — 80053 COMPREHEN METABOLIC PANEL: CPT

## 2020-10-27 PROCEDURE — 82550 ASSAY OF CK (CPK): CPT

## 2020-10-27 PROCEDURE — 85025 COMPLETE CBC W/AUTO DIFF WBC: CPT

## 2020-11-06 LAB — MISCELLANEOUS LAB TEST RESULT: NORMAL

## 2021-01-06 ENCOUNTER — TRANSCRIBE ORDERS (OUTPATIENT)
Dept: LAB | Facility: HOSPITAL | Age: 51
End: 2021-01-06

## 2021-01-06 DIAGNOSIS — G72.1: Primary | ICD-10-CM

## 2021-01-12 ENCOUNTER — APPOINTMENT (OUTPATIENT)
Dept: LAB | Facility: HOSPITAL | Age: 51
End: 2021-01-12

## 2021-01-12 DIAGNOSIS — G72.1: ICD-10-CM

## 2021-01-12 LAB
ALBUMIN SERPL BCP-MCNC: 2.5 G/DL (ref 3.5–5)
ALP SERPL-CCNC: 263 U/L (ref 46–116)
ALT SERPL W P-5'-P-CCNC: 26 U/L (ref 12–78)
ANION GAP SERPL CALCULATED.3IONS-SCNC: 0 MMOL/L (ref 4–13)
AST SERPL W P-5'-P-CCNC: 25 U/L (ref 5–45)
BASOPHILS # BLD AUTO: 0.09 THOUSANDS/ΜL (ref 0–0.1)
BASOPHILS NFR BLD AUTO: 1 % (ref 0–1)
BILIRUB SERPL-MCNC: 0.4 MG/DL (ref 0.2–1)
BUN SERPL-MCNC: 6 MG/DL (ref 5–25)
CALCIUM ALBUM COR SERPL-MCNC: 10.3 MG/DL (ref 8.3–10.1)
CALCIUM SERPL-MCNC: 9.1 MG/DL (ref 8.3–10.1)
CHLORIDE SERPL-SCNC: 106 MMOL/L (ref 100–108)
CK SERPL-CCNC: 54 U/L (ref 39–308)
CO2 SERPL-SCNC: 33 MMOL/L (ref 21–32)
CREAT SERPL-MCNC: 0.81 MG/DL (ref 0.6–1.3)
EOSINOPHIL # BLD AUTO: 0.58 THOUSAND/ΜL (ref 0–0.61)
EOSINOPHIL NFR BLD AUTO: 6 % (ref 0–6)
ERYTHROCYTE [DISTWIDTH] IN BLOOD BY AUTOMATED COUNT: 13.2 % (ref 11.6–15.1)
ERYTHROCYTE [SEDIMENTATION RATE] IN BLOOD: 15 MM/HOUR (ref 0–19)
GFR SERPL CREATININE-BSD FRML MDRD: 103 ML/MIN/1.73SQ M
GLUCOSE P FAST SERPL-MCNC: 177 MG/DL (ref 65–99)
HCT VFR BLD AUTO: 32.7 % (ref 36.5–49.3)
HGB BLD-MCNC: 10.6 G/DL (ref 12–17)
IMM GRANULOCYTES # BLD AUTO: 0.04 THOUSAND/UL (ref 0–0.2)
IMM GRANULOCYTES NFR BLD AUTO: 0 % (ref 0–2)
LYMPHOCYTES # BLD AUTO: 5.29 THOUSANDS/ΜL (ref 0.6–4.47)
LYMPHOCYTES NFR BLD AUTO: 51 % (ref 14–44)
MCH RBC QN AUTO: 29 PG (ref 26.8–34.3)
MCHC RBC AUTO-ENTMCNC: 32.4 G/DL (ref 31.4–37.4)
MCV RBC AUTO: 90 FL (ref 82–98)
MONOCYTES # BLD AUTO: 1.22 THOUSAND/ΜL (ref 0.17–1.22)
MONOCYTES NFR BLD AUTO: 12 % (ref 4–12)
NEUTROPHILS # BLD AUTO: 3.04 THOUSANDS/ΜL (ref 1.85–7.62)
NEUTS SEG NFR BLD AUTO: 30 % (ref 43–75)
NRBC BLD AUTO-RTO: 0 /100 WBCS
PLATELET # BLD AUTO: 352 THOUSANDS/UL (ref 149–390)
PMV BLD AUTO: 10.6 FL (ref 8.9–12.7)
POTASSIUM SERPL-SCNC: 4.7 MMOL/L (ref 3.5–5.3)
PROT SERPL-MCNC: 6.3 G/DL (ref 6.4–8.2)
RBC # BLD AUTO: 3.65 MILLION/UL (ref 3.88–5.62)
SODIUM SERPL-SCNC: 139 MMOL/L (ref 136–145)
WBC # BLD AUTO: 10.26 THOUSAND/UL (ref 4.31–10.16)

## 2021-01-12 PROCEDURE — 85652 RBC SED RATE AUTOMATED: CPT

## 2021-01-12 PROCEDURE — 36415 COLL VENOUS BLD VENIPUNCTURE: CPT

## 2021-01-12 PROCEDURE — 80053 COMPREHEN METABOLIC PANEL: CPT

## 2021-01-12 PROCEDURE — 82550 ASSAY OF CK (CPK): CPT

## 2021-01-12 PROCEDURE — 85025 COMPLETE CBC W/AUTO DIFF WBC: CPT

## 2021-02-04 ENCOUNTER — TELEPHONE (OUTPATIENT)
Dept: LAB | Facility: HOSPITAL | Age: 51
End: 2021-02-04

## 2021-02-25 ENCOUNTER — TRANSCRIBE ORDERS (OUTPATIENT)
Dept: LAB | Facility: HOSPITAL | Age: 51
End: 2021-02-25

## 2021-02-25 DIAGNOSIS — G72.2 TOXIC MYOPATHY: Primary | ICD-10-CM

## 2021-03-05 ENCOUNTER — TELEPHONE (OUTPATIENT)
Dept: LAB | Facility: HOSPITAL | Age: 51
End: 2021-03-05

## 2021-03-16 ENCOUNTER — TELEPHONE (OUTPATIENT)
Dept: LAB | Facility: HOSPITAL | Age: 51
End: 2021-03-16

## 2021-03-18 ENCOUNTER — TRANSCRIBE ORDERS (OUTPATIENT)
Dept: LAB | Facility: HOSPITAL | Age: 51
End: 2021-03-18

## 2021-03-18 DIAGNOSIS — G72.2 TOXIC MYOPATHY: Primary | ICD-10-CM

## 2021-03-19 ENCOUNTER — TRANSCRIBE ORDERS (OUTPATIENT)
Dept: LAB | Facility: HOSPITAL | Age: 51
End: 2021-03-19

## 2021-04-05 ENCOUNTER — APPOINTMENT (OUTPATIENT)
Dept: LAB | Facility: HOSPITAL | Age: 51
End: 2021-04-05

## 2021-04-05 DIAGNOSIS — G72.2 TOXIC MYOPATHY: ICD-10-CM

## 2021-04-05 LAB
25(OH)D3 SERPL-MCNC: 16.2 NG/ML (ref 30–100)
ALBUMIN SERPL BCP-MCNC: 2.9 G/DL (ref 3.5–5)
ALP SERPL-CCNC: 322 U/L (ref 46–116)
ALT SERPL W P-5'-P-CCNC: 49 U/L (ref 12–78)
ANION GAP SERPL CALCULATED.3IONS-SCNC: 6 MMOL/L (ref 4–13)
AST SERPL W P-5'-P-CCNC: 46 U/L (ref 5–45)
BASOPHILS # BLD AUTO: 0.15 THOUSANDS/ΜL (ref 0–0.1)
BASOPHILS NFR BLD AUTO: 1 % (ref 0–1)
BILIRUB SERPL-MCNC: 0.46 MG/DL (ref 0.2–1)
BUN SERPL-MCNC: 8 MG/DL (ref 5–25)
CALCIUM ALBUM COR SERPL-MCNC: 10.3 MG/DL (ref 8.3–10.1)
CALCIUM SERPL-MCNC: 9.4 MG/DL (ref 8.3–10.1)
CHLORIDE SERPL-SCNC: 99 MMOL/L (ref 100–108)
CHOLEST SERPL-MCNC: 235 MG/DL (ref 50–200)
CK SERPL-CCNC: 82 U/L (ref 39–308)
CO2 SERPL-SCNC: 30 MMOL/L (ref 21–32)
CREAT SERPL-MCNC: 1.04 MG/DL (ref 0.6–1.3)
EOSINOPHIL # BLD AUTO: 1.14 THOUSAND/ΜL (ref 0–0.61)
EOSINOPHIL NFR BLD AUTO: 9 % (ref 0–6)
ERYTHROCYTE [DISTWIDTH] IN BLOOD BY AUTOMATED COUNT: 12.6 % (ref 11.6–15.1)
ERYTHROCYTE [SEDIMENTATION RATE] IN BLOOD: 19 MM/HOUR (ref 0–19)
EST. AVERAGE GLUCOSE BLD GHB EST-MCNC: 309 MG/DL
GFR SERPL CREATININE-BSD FRML MDRD: 83 ML/MIN/1.73SQ M
GLUCOSE P FAST SERPL-MCNC: 110 MG/DL (ref 65–99)
HBA1C MFR BLD: 12.4 %
HCT VFR BLD AUTO: 38.8 % (ref 36.5–49.3)
HDLC SERPL-MCNC: 15 MG/DL
HGB BLD-MCNC: 13 G/DL (ref 12–17)
IMM GRANULOCYTES # BLD AUTO: 0.05 THOUSAND/UL (ref 0–0.2)
IMM GRANULOCYTES NFR BLD AUTO: 0 % (ref 0–2)
LYMPHOCYTES # BLD AUTO: 6.25 THOUSANDS/ΜL (ref 0.6–4.47)
LYMPHOCYTES NFR BLD AUTO: 51 % (ref 14–44)
MCH RBC QN AUTO: 29.3 PG (ref 26.8–34.3)
MCHC RBC AUTO-ENTMCNC: 33.5 G/DL (ref 31.4–37.4)
MCV RBC AUTO: 88 FL (ref 82–98)
MONOCYTES # BLD AUTO: 1.18 THOUSAND/ΜL (ref 0.17–1.22)
MONOCYTES NFR BLD AUTO: 10 % (ref 4–12)
NEUTROPHILS # BLD AUTO: 3.56 THOUSANDS/ΜL (ref 1.85–7.62)
NEUTS SEG NFR BLD AUTO: 29 % (ref 43–75)
NONHDLC SERPL-MCNC: 220 MG/DL
NRBC BLD AUTO-RTO: 0 /100 WBCS
PLATELET # BLD AUTO: 311 THOUSANDS/UL (ref 149–390)
PMV BLD AUTO: 11.1 FL (ref 8.9–12.7)
POTASSIUM SERPL-SCNC: 4 MMOL/L (ref 3.5–5.3)
PROT SERPL-MCNC: 7.5 G/DL (ref 6.4–8.2)
RBC # BLD AUTO: 4.43 MILLION/UL (ref 3.88–5.62)
SODIUM SERPL-SCNC: 135 MMOL/L (ref 136–145)
TRIGL SERPL-MCNC: 1288 MG/DL
WBC # BLD AUTO: 12.33 THOUSAND/UL (ref 4.31–10.16)

## 2021-04-05 PROCEDURE — 80061 LIPID PANEL: CPT

## 2021-04-05 PROCEDURE — 82306 VITAMIN D 25 HYDROXY: CPT

## 2021-04-05 PROCEDURE — 36415 COLL VENOUS BLD VENIPUNCTURE: CPT

## 2021-04-05 PROCEDURE — 82550 ASSAY OF CK (CPK): CPT

## 2021-04-05 PROCEDURE — 83036 HEMOGLOBIN GLYCOSYLATED A1C: CPT

## 2021-04-05 PROCEDURE — 85025 COMPLETE CBC W/AUTO DIFF WBC: CPT

## 2021-04-05 PROCEDURE — 85652 RBC SED RATE AUTOMATED: CPT

## 2021-04-05 PROCEDURE — 80053 COMPREHEN METABOLIC PANEL: CPT

## 2021-12-21 ENCOUNTER — TRANSCRIBE ORDERS (OUTPATIENT)
Dept: LAB | Facility: HOSPITAL | Age: 51
End: 2021-12-21

## 2021-12-21 DIAGNOSIS — E11.9 TYPE 2 DIABETES MELLITUS WITHOUT COMPLICATION, UNSPECIFIED WHETHER LONG TERM INSULIN USE (HCC): Primary | ICD-10-CM

## 2021-12-24 ENCOUNTER — TELEPHONE (OUTPATIENT)
Dept: LAB | Facility: HOSPITAL | Age: 51
End: 2021-12-24

## 2021-12-28 ENCOUNTER — TELEPHONE (OUTPATIENT)
Dept: LAB | Facility: HOSPITAL | Age: 51
End: 2021-12-28

## 2022-01-04 ENCOUNTER — APPOINTMENT (OUTPATIENT)
Dept: LAB | Facility: HOSPITAL | Age: 52
End: 2022-01-04
Payer: COMMERCIAL

## 2022-01-04 DIAGNOSIS — E11.9 TYPE 2 DIABETES MELLITUS WITHOUT COMPLICATION, UNSPECIFIED WHETHER LONG TERM INSULIN USE (HCC): ICD-10-CM

## 2022-01-04 LAB
25(OH)D3 SERPL-MCNC: 21.1 NG/ML (ref 30–100)
ALBUMIN SERPL BCP-MCNC: 3.5 G/DL (ref 3.5–5)
ALP SERPL-CCNC: 237 U/L (ref 46–116)
ALT SERPL W P-5'-P-CCNC: 25 U/L (ref 12–78)
ANION GAP SERPL CALCULATED.3IONS-SCNC: 4 MMOL/L (ref 4–13)
AST SERPL W P-5'-P-CCNC: 28 U/L (ref 5–45)
BASOPHILS # BLD AUTO: 0.08 THOUSANDS/ΜL (ref 0–0.1)
BASOPHILS NFR BLD AUTO: 1 % (ref 0–1)
BILIRUB SERPL-MCNC: 0.46 MG/DL (ref 0.2–1)
BUN SERPL-MCNC: 8 MG/DL (ref 5–25)
CALCIUM SERPL-MCNC: 9.2 MG/DL (ref 8.3–10.1)
CHLORIDE SERPL-SCNC: 107 MMOL/L (ref 100–108)
CHOLEST SERPL-MCNC: 258 MG/DL
CK SERPL-CCNC: 89 U/L (ref 39–308)
CO2 SERPL-SCNC: 27 MMOL/L (ref 21–32)
CREAT SERPL-MCNC: 0.94 MG/DL (ref 0.6–1.3)
EOSINOPHIL # BLD AUTO: 0.44 THOUSAND/ΜL (ref 0–0.61)
EOSINOPHIL NFR BLD AUTO: 5 % (ref 0–6)
ERYTHROCYTE [DISTWIDTH] IN BLOOD BY AUTOMATED COUNT: 14.4 % (ref 11.6–15.1)
ERYTHROCYTE [SEDIMENTATION RATE] IN BLOOD: 18 MM/HOUR (ref 0–19)
EST. AVERAGE GLUCOSE BLD GHB EST-MCNC: 263 MG/DL
GFR SERPL CREATININE-BSD FRML MDRD: 92 ML/MIN/1.73SQ M
GLUCOSE P FAST SERPL-MCNC: 178 MG/DL (ref 65–99)
HBA1C MFR BLD: 10.8 %
HCT VFR BLD AUTO: 42.8 % (ref 36.5–49.3)
HDLC SERPL-MCNC: 35 MG/DL
HGB BLD-MCNC: 14.5 G/DL (ref 12–17)
IMM GRANULOCYTES # BLD AUTO: 0.07 THOUSAND/UL (ref 0–0.2)
IMM GRANULOCYTES NFR BLD AUTO: 1 % (ref 0–2)
LYMPHOCYTES # BLD AUTO: 4.15 THOUSANDS/ΜL (ref 0.6–4.47)
LYMPHOCYTES NFR BLD AUTO: 51 % (ref 14–44)
MCH RBC QN AUTO: 31.6 PG (ref 26.8–34.3)
MCHC RBC AUTO-ENTMCNC: 33.9 G/DL (ref 31.4–37.4)
MCV RBC AUTO: 93 FL (ref 82–98)
MONOCYTES # BLD AUTO: 0.85 THOUSAND/ΜL (ref 0.17–1.22)
MONOCYTES NFR BLD AUTO: 10 % (ref 4–12)
NEUTROPHILS # BLD AUTO: 2.6 THOUSANDS/ΜL (ref 1.85–7.62)
NEUTS SEG NFR BLD AUTO: 32 % (ref 43–75)
NONHDLC SERPL-MCNC: 223 MG/DL
NRBC BLD AUTO-RTO: 0 /100 WBCS
PLATELET # BLD AUTO: 246 THOUSANDS/UL (ref 149–390)
PMV BLD AUTO: 11 FL (ref 8.9–12.7)
POTASSIUM SERPL-SCNC: 5.2 MMOL/L (ref 3.5–5.3)
PROT SERPL-MCNC: 8.1 G/DL (ref 6.4–8.2)
PSA SERPL-MCNC: 0.7 NG/ML (ref 0–4)
RBC # BLD AUTO: 4.59 MILLION/UL (ref 3.88–5.62)
SODIUM SERPL-SCNC: 138 MMOL/L (ref 136–145)
TRIGL SERPL-MCNC: 444 MG/DL
WBC # BLD AUTO: 8.19 THOUSAND/UL (ref 4.31–10.16)

## 2022-01-04 PROCEDURE — 36415 COLL VENOUS BLD VENIPUNCTURE: CPT

## 2022-01-04 PROCEDURE — 80053 COMPREHEN METABOLIC PANEL: CPT

## 2022-01-04 PROCEDURE — 82607 VITAMIN B-12: CPT

## 2022-01-04 PROCEDURE — 80061 LIPID PANEL: CPT

## 2022-01-04 PROCEDURE — 85652 RBC SED RATE AUTOMATED: CPT

## 2022-01-04 PROCEDURE — 85025 COMPLETE CBC W/AUTO DIFF WBC: CPT

## 2022-01-04 PROCEDURE — 82306 VITAMIN D 25 HYDROXY: CPT

## 2022-01-04 PROCEDURE — 82550 ASSAY OF CK (CPK): CPT

## 2022-01-04 PROCEDURE — 83036 HEMOGLOBIN GLYCOSYLATED A1C: CPT

## 2022-01-04 PROCEDURE — 84153 ASSAY OF PSA TOTAL: CPT

## 2022-01-06 LAB — VIT B12 SERPL-MCNC: 1824 PG/ML (ref 100–900)

## 2022-06-21 ENCOUNTER — TRANSCRIBE ORDERS (OUTPATIENT)
Dept: LAB | Facility: HOSPITAL | Age: 52
End: 2022-06-21

## 2022-06-21 DIAGNOSIS — E11.8 TYPE 2 DIABETES MELLITUS WITH UNSPECIFIED COMPLICATIONS (HCC): Primary | ICD-10-CM

## 2022-07-19 ENCOUNTER — LAB (OUTPATIENT)
Dept: LAB | Facility: HOSPITAL | Age: 52
End: 2022-07-19

## 2022-07-19 DIAGNOSIS — E11.8 TYPE 2 DIABETES MELLITUS WITH UNSPECIFIED COMPLICATIONS (HCC): ICD-10-CM

## 2022-07-19 LAB
ALBUMIN SERPL BCP-MCNC: 3.4 G/DL (ref 3.5–5)
ALP SERPL-CCNC: 178 U/L (ref 46–116)
ALT SERPL W P-5'-P-CCNC: 27 U/L (ref 12–78)
ANION GAP SERPL CALCULATED.3IONS-SCNC: 4 MMOL/L (ref 4–13)
AST SERPL W P-5'-P-CCNC: 27 U/L (ref 5–45)
BILIRUB SERPL-MCNC: 0.42 MG/DL (ref 0.2–1)
BUN SERPL-MCNC: 8 MG/DL (ref 5–25)
CALCIUM ALBUM COR SERPL-MCNC: 10.1 MG/DL (ref 8.3–10.1)
CALCIUM SERPL-MCNC: 9.6 MG/DL (ref 8.3–10.1)
CHLORIDE SERPL-SCNC: 107 MMOL/L (ref 96–108)
CHOLEST SERPL-MCNC: 135 MG/DL
CO2 SERPL-SCNC: 28 MMOL/L (ref 21–32)
CREAT SERPL-MCNC: 1.51 MG/DL (ref 0.6–1.3)
EST. AVERAGE GLUCOSE BLD GHB EST-MCNC: 194 MG/DL
GFR SERPL CREATININE-BSD FRML MDRD: 51 ML/MIN/1.73SQ M
GLUCOSE P FAST SERPL-MCNC: 130 MG/DL (ref 65–99)
HBA1C MFR BLD: 8.4 %
HDLC SERPL-MCNC: 32 MG/DL
LDLC SERPL CALC-MCNC: 47 MG/DL (ref 0–100)
NONHDLC SERPL-MCNC: 103 MG/DL
POTASSIUM SERPL-SCNC: 4.5 MMOL/L (ref 3.5–5.3)
PROT SERPL-MCNC: 8 G/DL (ref 6.4–8.4)
SODIUM SERPL-SCNC: 139 MMOL/L (ref 135–147)
TRIGL SERPL-MCNC: 278 MG/DL
TSH SERPL DL<=0.05 MIU/L-ACNC: 6.13 UIU/ML (ref 0.45–4.5)

## 2022-07-19 PROCEDURE — 36415 COLL VENOUS BLD VENIPUNCTURE: CPT

## 2022-07-19 PROCEDURE — 84443 ASSAY THYROID STIM HORMONE: CPT

## 2022-07-19 PROCEDURE — 80053 COMPREHEN METABOLIC PANEL: CPT

## 2022-07-19 PROCEDURE — 83036 HEMOGLOBIN GLYCOSYLATED A1C: CPT

## 2022-07-19 PROCEDURE — 80061 LIPID PANEL: CPT

## 2022-07-24 ENCOUNTER — APPOINTMENT (EMERGENCY)
Dept: CT IMAGING | Facility: HOSPITAL | Age: 52
End: 2022-07-24
Payer: COMMERCIAL

## 2022-07-24 ENCOUNTER — APPOINTMENT (EMERGENCY)
Dept: RADIOLOGY | Facility: HOSPITAL | Age: 52
End: 2022-07-24
Payer: COMMERCIAL

## 2022-07-24 ENCOUNTER — HOSPITAL ENCOUNTER (EMERGENCY)
Facility: HOSPITAL | Age: 52
Discharge: HOME | End: 2022-07-24
Attending: EMERGENCY MEDICINE | Admitting: EMERGENCY MEDICINE
Payer: COMMERCIAL

## 2022-07-24 VITALS
TEMPERATURE: 97.3 F | DIASTOLIC BLOOD PRESSURE: 82 MMHG | RESPIRATION RATE: 16 BRPM | SYSTOLIC BLOOD PRESSURE: 150 MMHG | OXYGEN SATURATION: 97 % | HEART RATE: 72 BPM

## 2022-07-24 DIAGNOSIS — T38.3X5A HYPOGLYCEMIA DUE TO INSULIN: ICD-10-CM

## 2022-07-24 DIAGNOSIS — E16.0 HYPOGLYCEMIA DUE TO INSULIN: ICD-10-CM

## 2022-07-24 DIAGNOSIS — R79.0 LOW MAGNESIUM LEVEL: ICD-10-CM

## 2022-07-24 DIAGNOSIS — I95.1 ORTHOSTATIC HYPOTENSION: ICD-10-CM

## 2022-07-24 DIAGNOSIS — R55 SYNCOPE: Primary | ICD-10-CM

## 2022-07-24 DIAGNOSIS — N17.9 AKI (ACUTE KIDNEY INJURY) (HCC): ICD-10-CM

## 2022-07-24 LAB
2HR DELTA HS TROPONIN: 0 NG/L
ALBUMIN SERPL BCP-MCNC: 3 G/DL (ref 3.5–5)
ALP SERPL-CCNC: 196 U/L (ref 46–116)
ALT SERPL W P-5'-P-CCNC: 34 U/L (ref 12–78)
ANION GAP SERPL CALCULATED.3IONS-SCNC: 10 MMOL/L (ref 4–13)
APAP SERPL-MCNC: <2 UG/ML (ref 10–20)
APTT PPP: 32 SECONDS (ref 23–37)
AST SERPL W P-5'-P-CCNC: 39 U/L (ref 5–45)
BASOPHILS # BLD AUTO: 0.1 THOUSANDS/ΜL (ref 0–0.1)
BASOPHILS NFR BLD AUTO: 1 % (ref 0–1)
BILIRUB DIRECT SERPL-MCNC: 0.15 MG/DL (ref 0–0.2)
BILIRUB SERPL-MCNC: 0.37 MG/DL (ref 0.2–1)
BILIRUB UR QL STRIP: NEGATIVE
BUN SERPL-MCNC: 8 MG/DL (ref 5–25)
CALCIUM SERPL-MCNC: 8.4 MG/DL (ref 8.3–10.1)
CARDIAC TROPONIN I PNL SERPL HS: 3 NG/L
CARDIAC TROPONIN I PNL SERPL HS: 3 NG/L
CHLORIDE SERPL-SCNC: 106 MMOL/L (ref 96–108)
CK SERPL-CCNC: 73 U/L (ref 39–308)
CLARITY UR: CLEAR
CO2 SERPL-SCNC: 26 MMOL/L (ref 21–32)
COLOR UR: YELLOW
CREAT SERPL-MCNC: 1.37 MG/DL (ref 0.6–1.3)
EOSINOPHIL # BLD AUTO: 0.53 THOUSAND/ΜL (ref 0–0.61)
EOSINOPHIL NFR BLD AUTO: 5 % (ref 0–6)
ERYTHROCYTE [DISTWIDTH] IN BLOOD BY AUTOMATED COUNT: 11.9 % (ref 11.6–15.1)
ETHANOL SERPL-MCNC: <3 MG/DL (ref 0–3)
GFR SERPL CREATININE-BSD FRML MDRD: 58 ML/MIN/1.73SQ M
GLUCOSE SERPL-MCNC: 109 MG/DL (ref 65–140)
GLUCOSE SERPL-MCNC: 134 MG/DL (ref 65–140)
GLUCOSE SERPL-MCNC: 285 MG/DL (ref 65–140)
GLUCOSE UR STRIP-MCNC: NEGATIVE MG/DL
HCT VFR BLD AUTO: 37.2 % (ref 36.5–49.3)
HGB BLD-MCNC: 12.9 G/DL (ref 12–17)
HGB UR QL STRIP.AUTO: NEGATIVE
IMM GRANULOCYTES # BLD AUTO: 0.05 THOUSAND/UL (ref 0–0.2)
IMM GRANULOCYTES NFR BLD AUTO: 0 % (ref 0–2)
INR PPP: 1.17 (ref 0.84–1.19)
KETONES UR STRIP-MCNC: NEGATIVE MG/DL
LACTATE SERPL-SCNC: 1.4 MMOL/L (ref 0.5–2)
LEUKOCYTE ESTERASE UR QL STRIP: NEGATIVE
LIPASE SERPL-CCNC: <10 U/L (ref 73–393)
LYMPHOCYTES # BLD AUTO: 5.98 THOUSANDS/ΜL (ref 0.6–4.47)
LYMPHOCYTES NFR BLD AUTO: 52 % (ref 14–44)
MAGNESIUM SERPL-MCNC: 1.2 MG/DL (ref 1.6–2.6)
MCH RBC QN AUTO: 31.6 PG (ref 26.8–34.3)
MCHC RBC AUTO-ENTMCNC: 34.7 G/DL (ref 31.4–37.4)
MCV RBC AUTO: 91 FL (ref 82–98)
MONOCYTES # BLD AUTO: 1.21 THOUSAND/ΜL (ref 0.17–1.22)
MONOCYTES NFR BLD AUTO: 10 % (ref 4–12)
NEUTROPHILS # BLD AUTO: 3.73 THOUSANDS/ΜL (ref 1.85–7.62)
NEUTS SEG NFR BLD AUTO: 32 % (ref 43–75)
NITRITE UR QL STRIP: NEGATIVE
NRBC BLD AUTO-RTO: 0 /100 WBCS
PH UR STRIP.AUTO: 6 [PH]
PLATELET # BLD AUTO: 404 THOUSANDS/UL (ref 149–390)
PMV BLD AUTO: 9.9 FL (ref 8.9–12.7)
POTASSIUM SERPL-SCNC: 3.8 MMOL/L (ref 3.5–5.3)
PROT SERPL-MCNC: 6.4 G/DL (ref 6.4–8.4)
PROT UR STRIP-MCNC: NEGATIVE MG/DL
PROTHROMBIN TIME: 15.2 SECONDS (ref 11.6–14.5)
RBC # BLD AUTO: 4.08 MILLION/UL (ref 3.88–5.62)
SALICYLATES SERPL-MCNC: <3 MG/DL (ref 3–20)
SARS-COV-2 RNA RESP QL NAA+PROBE: NEGATIVE
SODIUM SERPL-SCNC: 142 MMOL/L (ref 135–147)
SP GR UR STRIP.AUTO: <=1.005 (ref 1–1.03)
TSH SERPL DL<=0.05 MIU/L-ACNC: 3.11 UIU/ML (ref 0.45–4.5)
UROBILINOGEN UR QL STRIP.AUTO: 0.2 E.U./DL
WBC # BLD AUTO: 11.6 THOUSAND/UL (ref 4.31–10.16)

## 2022-07-24 PROCEDURE — 82077 ASSAY SPEC XCP UR&BREATH IA: CPT | Performed by: EMERGENCY MEDICINE

## 2022-07-24 PROCEDURE — 99284 EMERGENCY DEPT VISIT MOD MDM: CPT

## 2022-07-24 PROCEDURE — 80175 DRUG SCREEN QUAN LAMOTRIGINE: CPT | Performed by: EMERGENCY MEDICINE

## 2022-07-24 PROCEDURE — 96361 HYDRATE IV INFUSION ADD-ON: CPT

## 2022-07-24 PROCEDURE — 70450 CT HEAD/BRAIN W/O DYE: CPT

## 2022-07-24 PROCEDURE — 82550 ASSAY OF CK (CPK): CPT | Performed by: EMERGENCY MEDICINE

## 2022-07-24 PROCEDURE — 83735 ASSAY OF MAGNESIUM: CPT | Performed by: EMERGENCY MEDICINE

## 2022-07-24 PROCEDURE — 80076 HEPATIC FUNCTION PANEL: CPT | Performed by: EMERGENCY MEDICINE

## 2022-07-24 PROCEDURE — 85025 COMPLETE CBC W/AUTO DIFF WBC: CPT | Performed by: EMERGENCY MEDICINE

## 2022-07-24 PROCEDURE — 80143 DRUG ASSAY ACETAMINOPHEN: CPT | Performed by: EMERGENCY MEDICINE

## 2022-07-24 PROCEDURE — G1004 CDSM NDSC: HCPCS

## 2022-07-24 PROCEDURE — 87040 BLOOD CULTURE FOR BACTERIA: CPT | Performed by: EMERGENCY MEDICINE

## 2022-07-24 PROCEDURE — 83605 ASSAY OF LACTIC ACID: CPT | Performed by: EMERGENCY MEDICINE

## 2022-07-24 PROCEDURE — U0003 INFECTIOUS AGENT DETECTION BY NUCLEIC ACID (DNA OR RNA); SEVERE ACUTE RESPIRATORY SYNDROME CORONAVIRUS 2 (SARS-COV-2) (CORONAVIRUS DISEASE [COVID-19]), AMPLIFIED PROBE TECHNIQUE, MAKING USE OF HIGH THROUGHPUT TECHNOLOGIES AS DESCRIBED BY CMS-2020-01-R: HCPCS | Performed by: EMERGENCY MEDICINE

## 2022-07-24 PROCEDURE — 96365 THER/PROPH/DIAG IV INF INIT: CPT

## 2022-07-24 PROCEDURE — 36415 COLL VENOUS BLD VENIPUNCTURE: CPT | Performed by: EMERGENCY MEDICINE

## 2022-07-24 PROCEDURE — 84443 ASSAY THYROID STIM HORMONE: CPT | Performed by: EMERGENCY MEDICINE

## 2022-07-24 PROCEDURE — 93005 ELECTROCARDIOGRAM TRACING: CPT

## 2022-07-24 PROCEDURE — 80177 DRUG SCRN QUAN LEVETIRACETAM: CPT | Performed by: EMERGENCY MEDICINE

## 2022-07-24 PROCEDURE — 82948 REAGENT STRIP/BLOOD GLUCOSE: CPT

## 2022-07-24 PROCEDURE — 81003 URINALYSIS AUTO W/O SCOPE: CPT | Performed by: EMERGENCY MEDICINE

## 2022-07-24 PROCEDURE — 85610 PROTHROMBIN TIME: CPT | Performed by: EMERGENCY MEDICINE

## 2022-07-24 PROCEDURE — 85730 THROMBOPLASTIN TIME PARTIAL: CPT | Performed by: EMERGENCY MEDICINE

## 2022-07-24 PROCEDURE — 74176 CT ABD & PELVIS W/O CONTRAST: CPT

## 2022-07-24 PROCEDURE — 80179 DRUG ASSAY SALICYLATE: CPT | Performed by: EMERGENCY MEDICINE

## 2022-07-24 PROCEDURE — 83690 ASSAY OF LIPASE: CPT | Performed by: EMERGENCY MEDICINE

## 2022-07-24 PROCEDURE — 71045 X-RAY EXAM CHEST 1 VIEW: CPT

## 2022-07-24 PROCEDURE — 80048 BASIC METABOLIC PNL TOTAL CA: CPT | Performed by: EMERGENCY MEDICINE

## 2022-07-24 PROCEDURE — U0005 INFEC AGEN DETEC AMPLI PROBE: HCPCS | Performed by: EMERGENCY MEDICINE

## 2022-07-24 PROCEDURE — 84484 ASSAY OF TROPONIN QUANT: CPT | Performed by: EMERGENCY MEDICINE

## 2022-07-24 PROCEDURE — 99285 EMERGENCY DEPT VISIT HI MDM: CPT | Performed by: EMERGENCY MEDICINE

## 2022-07-24 RX ORDER — MAGNESIUM SULFATE HEPTAHYDRATE 40 MG/ML
2 INJECTION, SOLUTION INTRAVENOUS ONCE
Status: COMPLETED | OUTPATIENT
Start: 2022-07-24 | End: 2022-07-24

## 2022-07-24 RX ORDER — DEXTROSE AND SODIUM CHLORIDE 5; .9 G/100ML; G/100ML
999 INJECTION, SOLUTION INTRAVENOUS ONCE
Status: COMPLETED | OUTPATIENT
Start: 2022-07-24 | End: 2022-07-24

## 2022-07-24 RX ADMIN — MAGNESIUM SULFATE HEPTAHYDRATE 2 G: 40 INJECTION, SOLUTION INTRAVENOUS at 14:38

## 2022-07-24 RX ADMIN — DEXTROSE AND SODIUM CHLORIDE 999 ML/HR: 5; .9 INJECTION, SOLUTION INTRAVENOUS at 13:51

## 2022-07-24 RX ADMIN — SODIUM CHLORIDE 1000 ML: 0.9 INJECTION, SOLUTION INTRAVENOUS at 13:01

## 2022-07-24 NOTE — ED PROVIDER NOTES
History  Chief Complaint   Patient presents with    Hypoglycemia - no symptoms     Per EMS, pt hypotensive, hypoglycemic  EMS administered 250mL bolus of D10 IV  Pt complaint free upon arrival       49yo -old right-hand-dominant male presents by EMS with complaints of low blood pressure readings; he called patient was in his usual state of health when he woke up this morning he was on the couch and then he has no preceding symptoms but because blood pressure was 60/40 orthostatic blood pressures were obtained by EMS sit lying on the couch she is a systolic pressure of 077 sitting is a systolic pressure of 98 standing he has a systolic pressure of 70; they reviewed his Accu-Chek readings on his [facility] his blood sugar was 300 this morning and in the preceding hours to the ambulance call it was 56 upon their arrival Accu-Chek was 70 and then recheck in the rate was 66 therefore he received a bolus of 250 mL of D10  Patient currently feels better  He does not have any specific complaints  Patient states he has problems with low blood pressure is on a [facility]);  patient woke up in his usual state of health today he was he recalls being on his couch  He had no prodrome will symptoms and the next thing he realizes he wakes up on his couch with loss of bowel control  He went to the bathroom to try to clean up he kept dropping the toilet paper and then eventually checks his blood pressure at 60/40  Patient believes he blacked out  He denies any shortness of breath or chest pain upon preceding or after the incident  He does recall giving himself some insulin this morning he took his usual medications  There was no urinary incontinence  He did not bite his tongue  He denies any myalgias or headache  Patient states that he does fall lot and his balance is is poor he attributes it to antibodies attacking his muscles    He does have a history of a seizure disorder he is maintained on Lamictal as well as Keppra but states he has not had a seizure since 2007  He is not anticoagulated  He states he does have frequent falls but denies hitting his head  Over the last couple of days he has had a headache which has resolved with Tylenol as well as pain in the back of his head  He currently is denying a headache  He has had no fever or chills does complain of a cough which is new it mucus is clear and occasionally yellow  Patient denies any abdominal back or neck pain he has had no nausea vomiting he has had loose stools which she attributes to gabapentin the stools are dark due to iron supplementation  Patient denies prior history of DVT or PE he has no myalgias currently he states he has been able to tolerate a diet  Past medical history is remarkable for coronary artery disease depression type 2 diabetes on insulin diabetic neuropathy alcohol myopathy GERD hyperlipidemia hypotension seizure disorder bipolar disorder panic disorder and history of splenectomy and pancreatectomy  Prior to Admission Medications   Prescriptions Last Dose Informant Patient Reported? Taking?    COLESTIPOL HCL PO   Yes No   Sig: Take 1 g by mouth 2 (two) times a day   Empagliflozin 25 MG TABS   Yes No   Sig: Take 25 mg by mouth every morning   HYDROcodone-acetaminophen (NORCO) 5-325 mg per tablet   Yes No   Sig: Take 1 tablet by mouth every 12 (twelve) hours as needed for pain   Magnesium Oxide 400 MG CAPS   Yes No   Sig: Take 400 mg by mouth 2 (two) times a day   Melatonin 5 MG CAPS   Yes No   Sig: Take 5 mg by mouth daily at bedtime   Multiple Vitamins-Minerals (MULTIVITAMIN WITH MINERALS) tablet   Yes No   Sig: Take 1 tablet by mouth daily   aspirin (ECOTRIN LOW STRENGTH) 81 mg EC tablet   Yes No   Sig: Take 81 mg by mouth daily   atorvastatin (LIPITOR) 10 mg tablet   Yes No   Sig: Take 40 mg by mouth daily    busPIRone (BUSPAR) 15 mg tablet   Yes No   Sig: Take 30 mg by mouth 2 (two) times a day   calcium carbonate (OS-LUIGI) 600 MG tablet   Yes No   Sig: Take 650 mg by mouth 2 (two) times a day with meals   cyanocobalamin 1000 MCG tablet   Yes No   Sig: Take 100 mcg by mouth daily   ergocalciferol (ERGOCALCIFEROL) 99115 units capsule   Yes No   Sig: Take 50,000 Units by mouth once a week   ferrous sulfate 325 (65 Fe) mg tablet   Yes No   Sig: Take 325 mg by mouth daily with breakfast   folic acid (FOLVITE) 1 mg tablet   Yes No   Sig: Take 1 mg by mouth daily   gemfibrozil (LOPID) 600 mg tablet   Yes No   Sig: Take 600 mg by mouth daily   insulin glargine (LANTUS) 100 units/mL subcutaneous injection   No No   Sig: Inject 30 Units under the skin 2 (two) times a day   insulin lispro (HumaLOG) 100 units/mL injection   No No   Sig: Inject 1-6 Units under the skin 3 (three) times a day before meals   lamoTRIgine (LaMICtal) 25 mg tablet   Yes No   Sig: Take 25 mg by mouth daily   levETIRAcetam (KEPPRA) 500 mg tablet   Yes No   Sig: Take 500 mg by mouth every 12 (twelve) hours   metFORMIN (GLUCOPHAGE) 1000 MG tablet   Yes No   Sig: Take 1,000 mg by mouth 2 (two) times a day with meals   metoprolol tartrate (LOPRESSOR) 25 mg tablet   Yes No   Sig: Take 25 mg by mouth every 12 (twelve) hours   mirtazapine (REMERON) 30 mg tablet   Yes No   Sig: Take 45 mg by mouth daily at bedtime    omeprazole (PriLOSEC) 20 mg delayed release capsule   Yes No   Sig: Take 40 mg by mouth daily   pantoprazole (PROTONIX) 40 mg tablet   No No   Sig: Take 1 tablet (40 mg total) by mouth daily in the early morning   Patient not taking: Reported on 6/27/2020   potassium chloride (K-DUR,KLOR-CON) 20 mEq tablet   Yes No   Sig: Take 20 mEq by mouth 2 (two) times a day   sertraline (ZOLOFT) 100 mg tablet   Yes No   Sig: Take 100 mg by mouth 2 (two) times a day   thiamine (VITAMIN B1) 100 mg tablet   Yes No   Sig: Take 100 mg by mouth daily      Facility-Administered Medications: None       Past Medical History:   Diagnosis Date    Alcoholic hepatitis     Anxiety     Chronic liver disease     Depression     Diabetes ([place] [address] )     Diabetic ulcer of toe of right foot associated with type 2 diabetes mellitus ([place] [address] ) 6/27/2020    Diabetic ulcer of toe of right foot associated with type 2 diabetes mellitus, with fat layer exposed ([place] [address] ) 6/27/2020    Iron deficiency anemia     Lactic acidosis 6/27/2020    Pancreatitis     PTSD (post-traumatic stress disorder)     Seizure ([place] [address] )     Seizure disorder ([place] [address] ) 6/27/2020    Sepsis, unspecified organism ([place] [address] ) 6/27/2020    Type 2 diabetes mellitus with diabetic polyneuropathy, without long-term current use of insulin ([place] [address] ) 3/25/2019       Past Surgical History:   Procedure Laterality Date    ABDOMINAL SURGERY      CARDIAC CATHETERIZATION      PANCREATECTOMY      SPLENECTOMY      TONSILLECTOMY         No family history on file  I have reviewed and agree with the history as documented  E-Cigarette/Vaping    E-Cigarette Use Never User      E-Cigarette/Vaping Substances     Social History     Tobacco Use    Smoking status: Never Smoker    Smokeless tobacco: Never Used   Vaping Use    Vaping Use: Never used   Substance Use Topics    Alcohol use: Yes     Alcohol/week: 15 0 standard drinks     Types: 15 Cans of beer per week     Comment: 15 cans beer daily    Drug use: Never       Review of Systems   Constitutional: Positive for activity change  Negative for appetite change, chills, diaphoresis, fatigue and fever  HENT: Negative for congestion, ear pain, rhinorrhea, sneezing and sore throat  Eyes: Negative for discharge  Respiratory: Positive for cough  Negative for shortness of breath  Cardiovascular: Negative for chest pain and leg swelling  Gastrointestinal: Positive for diarrhea  Negative for abdominal distention, abdominal pain, blood in stool, nausea and vomiting  Endocrine: Negative for polyuria  Genitourinary: Negative for difficulty urinating, dysuria, frequency and urgency  Musculoskeletal: Negative for arthralgias, back pain and myalgias  Skin: Negative for rash  Neurological: Positive for syncope, light-headedness and headaches (none currently)  Negative for dizziness and numbness  Hematological: Negative for adenopathy  Psychiatric/Behavioral: Negative for confusion  All other systems reviewed and are negative  Physical Exam  Physical Exam  Vitals and nursing note reviewed  Constitutional:       General: He is not in acute distress  Appearance: He is well-developed  He is not ill-appearing, toxic-appearing or diaphoretic  HENT:      Head: Normocephalic and atraumatic  Right Ear: Tympanic membrane and external ear normal       Left Ear: Tympanic membrane and external ear normal       Nose: Nose normal       Mouth/Throat:      Mouth: Mucous membranes are moist       Pharynx: No oropharyngeal exudate or posterior oropharyngeal erythema  Eyes:      General: No scleral icterus  Right eye: No discharge  Left eye: No discharge  Extraocular Movements: Extraocular movements intact  Conjunctiva/sclera: Conjunctivae normal       Pupils: Pupils are equal, round, and reactive to light  Comments: 3mm equal   Cardiovascular:      Rate and Rhythm: Normal rate and regular rhythm  Pulses: Normal pulses  Heart sounds: Normal heart sounds  Pulmonary:      Effort: Pulmonary effort is normal  No respiratory distress  Breath sounds: Normal breath sounds  No wheezing or rales  Chest:      Chest wall: No tenderness  Abdominal:      General: Bowel sounds are normal  There is no distension  Palpations: Abdomen is soft  There is no mass  Tenderness: There is no abdominal tenderness  There is no right CVA tenderness, left CVA tenderness, guarding or rebound  Hernia: A hernia (incisional reducable) is present        Comments: Back: no mildline T or L spine tenderness   Genitourinary:     Comments: Rectal soft brown stool heme negative controls intact  Musculoskeletal:         General: No tenderness or deformity  Normal range of motion  Cervical back: Normal range of motion and neck supple  No rigidity or tenderness  Lymphadenopathy:      Cervical: No cervical adenopathy  Skin:     General: Skin is warm and dry  Capillary Refill: Capillary refill takes less than 2 seconds  Neurological:      Mental Status: He is alert and oriented to person, place, and time  Cranial Nerves: No cranial nerve deficit  Sensory: No sensory deficit  Motor: No weakness or abnormal muscle tone  Coordination: Coordination normal       Deep Tendon Reflexes: Reflexes normal       Comments: GCS 15 No pronator drift equal hand ; 5/5 motor strength throughout DTRs are symmetric light touch is symmetric with respect to neruopathy   Unsteady with transfer to to bedside commode   Psychiatric:         Mood and Affect: Mood normal          Vital Signs  ED Triage Vitals [07/24/22 1222]   Temperature Pulse Respirations Blood Pressure SpO2   (!) 97 3 °F (36 3 °C) 67 17 118/86 97 %      Temp Source Heart Rate Source Patient Position - Orthostatic VS BP Location FiO2 (%)   Temporal Monitor -- -- --      Pain Score       No Pain           Vitals:    07/24/22 1400 07/24/22 1456 07/24/22 1515 07/24/22 1700   BP: 131/88 138/95  150/82   Pulse: 67 78 69 72         Visual Acuity      ED Medications  Medications   sodium chloride 0 9 % bolus 1,000 mL (0 mL Intravenous Stopped 7/24/22 1352)   dextrose 5 % and sodium chloride 0 9 % infusion (0 mL/hr Intravenous Stopped 7/24/22 1451)   magnesium sulfate 2 g/50 mL IVPB (premix) 2 g (0 g Intravenous Stopped 7/24/22 1538)       Diagnostic Studies  Results Reviewed     Procedure Component Value Units Date/Time    HS Troponin I 2hr [[id number]]  (Normal) Collected: 07/24/22 1458    Lab Status: Final result Specimen: Blood from Arm, Right Updated: 07/24/22 1526     hs TnI 2hr 3 ng/L      Delta 2hr hsTnI 0 ng/L     Fingerstick Glucose (POCT) [[id number]]  (Abnormal) Collected: 07/24/22 1522    Lab Status: Final result Updated: 07/24/22 1523     POC Glucose 285 mg/dl     UA w Reflex to Microscopic w Reflex to Culture [[id number]] Collected: 07/24/22 1451    Lab Status: Final result Specimen: Urine, Clean Catch Updated: 07/24/22 1505     Color, UA Yellow     Clarity, UA Clear     Specific Gravity, UA <=1 005     pH, UA 6 0     Leukocytes, UA Negative     Nitrite, UA Negative     Protein, UA Negative mg/dl      Glucose, UA Negative mg/dl      Ketones, UA Negative mg/dl      Urobilinogen, UA 0 2 E U /dl      Bilirubin, UA Negative     Occult Blood, UA Negative    COVID only [[id number]]  (Normal) Collected: 07/24/22 1301    Lab Status: Final result Specimen: Nares from Nose Updated: 07/24/22 1357     SARS-CoV-2 Negative    Narrative:      FOR PEDIATRIC PATIENTS - copy/paste COVID Guidelines URL to browser: [email or web address]  [employer]    SARS-CoV-2 assay is a Nucleic Acid Amplification assay intended for the  qualitative detection of nucleic acid from SARS-CoV-2 in nasopharyngeal  swabs  Results are for the presumptive identification of SARS-CoV-2 RNA  Positive results are indicative of infection with SARS-CoV-2, the virus  causing COVID-19, but do not rule out bacterial infection or co-infection  with other viruses  Laboratories within the [place] and its  territories are required to report all positive results to the appropriate  public health authorities  Negative results do not preclude SARS-CoV-2  infection and should not be used as the sole basis for treatment or other  patient management decisions  Negative results must be combined with  clinical observations, patient history, and epidemiological information  This test has not been FDA cleared or approved  This test has been authorized by FDA under an Emergency Use Authorization  (EUA)   This test is only authorized for the duration of time the  declaration that circumstances exist justifying the authorization of the  emergency use of an in vitro diagnostic tests for detection of SARS-CoV-2  virus and/or diagnosis of COVID-19 infection under section 564(b)(1) of  the Act, 21 U  S C  548ESI-2(H)(4), unless the authorization is terminated  or revoked sooner  The test has been validated but independent review by FDA  and CLIA is pending  Test performed using [employer] Gene[employer]pert: This RT-PCR assay targets N2,  a region unique to SARS-CoV-2  A conserved region in the E-gene was chosen  for pan-Sarbecovirus detection which includes SARS-CoV-2  Acetaminophen level-If concentration is detectable, please discuss with medical  on call   [[id number]]  (Abnormal) Collected: 07/24/22 1301    Lab Status: Final result Specimen: Blood from Arm, Left Updated: 07/24/22 1342     Acetaminophen Level <2 ug/mL     Lipase [[id number]]  (Abnormal) Collected: 07/24/22 1301    Lab Status: Final result Specimen: Blood from Arm, Left Updated: 07/24/22 1342     Lipase <10 u/L     Basic metabolic panel [[id number]]  (Abnormal) Collected: 07/24/22 1301    Lab Status: Final result Specimen: Blood from Arm, Left Updated: 07/24/22 1337     Sodium 142 mmol/L      Potassium 3 8 mmol/L      Chloride 106 mmol/L      CO2 26 mmol/L      ANION GAP 10 mmol/L      BUN 8 mg/dL      Creatinine 1 37 mg/dL      Glucose 134 mg/dL      Calcium 8 4 mg/dL      eGFR 58 ml/min/1 73sq m     Narrative:      [place] guidelines for Chronic Kidney Disease (CKD):     Stage 1 with normal or high GFR (GFR > 90 mL/min/1 73 square meters)    Stage 2 Mild CKD (GFR = 60-89 mL/min/1 73 square meters)    Stage 3A Moderate CKD (GFR = 45-59 mL/min/1 73 square meters)    Stage 3B Moderate CKD (GFR = 30-44 mL/min/1 73 square meters)    Stage 4 Severe CKD (GFR = 15-29 mL/min/1 73 square meters)    Stage 5 End Stage CKD (GFR <15 mL/min/1 73 square meters)  Note: GFR calculation is accurate only with a steady state creatinine    TSH, 3rd generation with Free T4 reflex [[id number]]  (Normal) Collected: 07/24/22 1301    Lab Status: Final result Specimen: Blood from Arm, Left Updated: 07/24/22 1337     TSH 3RD GENERATON 3 106 uIU/mL     Narrative:      Patients undergoing fluorescein dye angiography may retain small amounts of fluorescein in the body for 48-72 hours post procedure  Samples containing fluorescein can produce falsely depressed TSH values  If the patient had this procedure,a specimen should be resubmitted post fluorescein clearance  Magnesium [[id number]]  (Abnormal) Collected: 07/24/22 1301    Lab Status: Final result Specimen: Blood from Arm, Left Updated: 07/24/22 1337     Magnesium 1 2 mg/dL     Hepatic function panel [[id number]]  (Abnormal) Collected: 07/24/22 1301    Lab Status: Final result Specimen: Blood from Arm, Left Updated: 07/24/22 1337     Total Bilirubin 0 37 mg/dL      Bilirubin, Direct 0 15 mg/dL      Alkaline Phosphatase 196 U/L      AST 39 U/L      ALT 34 U/L      Total Protein 6 4 g/dL      Albumin 3 0 g/dL     Salicylate level [[id number]]  (Abnormal) Collected: 07/24/22 1301    Lab Status: Final result Specimen: Blood from Arm, Left Updated: 53/92/25 4224     Salicylate Lvl <3 mg/dL     CK Total with Reflex CKMB [[id number]]  (Normal) Collected: 07/24/22 1301    Lab Status: Final result Specimen: Blood from Arm, Left Updated: 07/24/22 1337     Total CK 73 U/L     Fingerstick Glucose (POCT) [[id number]]  (Normal) Collected: 07/24/22 1333    Lab Status: Final result Updated: 07/24/22 1334     POC Glucose 109 mg/dl     Lamotrigine level [[id number]] Collected: 07/24/22 1327    Lab Status:  In process Specimen: Blood from Arm, Left Updated: 07/24/22 1332    HS Troponin 0hr (reflex protocol) [[id number]]  (Normal) Collected: 07/24/22 1301    Lab Status: Final result Specimen: Blood from Arm, Left Updated: 07/24/22 1331     hs TnI 0hr 3 ng/L     Lactic acid [[id number]]  (Normal) Collected: 07/24/22 1301    Lab Status: Final result Specimen: Blood from Arm, Left Updated: 07/24/22 1326     LACTIC ACID 1 4 mmol/L     Narrative:      Result may be elevated if tourniquet was used during collection  Protime-INR [[id number]]  (Abnormal) Collected: 07/24/22 1301    Lab Status: Final result Specimen: Blood from Arm, Left Updated: 07/24/22 1321     Protime 15 2 seconds      INR 1 17    APTT [[id number]]  (Normal) Collected: 07/24/22 1301    Lab Status: Final result Specimen: Blood from Arm, Left Updated: 07/24/22 1321     PTT 32 seconds     Ethanol [[id number]]  (Normal) Collected: 07/24/22 1301    Lab Status: Final result Specimen: Blood from Arm, Left Updated: 07/24/22 1319     Ethanol Lvl <3 mg/dL     CBC and differential [[id number]]  (Abnormal) Collected: 07/24/22 1301    Lab Status: Final result Specimen: Blood from Arm, Left Updated: 07/24/22 1312     WBC 11 60 Thousand/uL      RBC 4 08 Million/uL      Hemoglobin 12 9 g/dL      Hematocrit 37 2 %      MCV 91 fL      MCH 31 6 pg      MCHC 34 7 g/dL      RDW 11 9 %      MPV 9 9 fL      Platelets 012 Thousands/uL      nRBC 0 /100 WBCs      Neutrophils Relative 32 %      Immat GRANS % 0 %      Lymphocytes Relative 52 %      Monocytes Relative 10 %      Eosinophils Relative 5 %      Basophils Relative 1 %      Neutrophils Absolute 3 73 Thousands/µL      Immature Grans Absolute 0 05 Thousand/uL      Lymphocytes Absolute 5 98 Thousands/µL      Monocytes Absolute 1 21 Thousand/µL      Eosinophils Absolute 0 53 Thousand/µL      Basophils Absolute 0 10 Thousands/µL     Levetiracetam level [[id number]] Collected: 07/24/22 1308    Lab Status: In process Specimen: Blood from Arm, Right Updated: 07/24/22 1310    Blood culture #1 [[id number]] Collected: 07/24/22 1308    Lab Status:  In process Specimen: Blood from Arm, Right Updated: 07/24/22 1310    Blood culture #2 [[id number]] Collected: 07/24/22 1301    Lab Status: In process Specimen: Blood from Arm, Left Updated: 07/24/22 1303                 CT abdomen pelvis wo contrast   Final Result by Davian Bloom DO (07/24 1520)      1  Fluid noted within the colon  Correlate for diarrheal illness  No focal inflammatory changes in the abdomen or pelvis  2  Markedly distended urinary bladder, otherwise unremarkable  Correlate clinically for neurogenic bladder  3  Findings consistent with chronic pancreatitis  4  Fatty liver  Limited study without IV or oral contrast       Workstation performed: [id number]         CT head without contrast   Final Result by Rock Aarti MD (07/24 2199)      No acute intracranial abnormality  Workstation performed: [id number]         XR chest 1 view portable   ED Interpretation by Jose Rodríguez MD (07/24 1333)   Read by me; Radiologist to provide formal interpretation   No acute process                 Procedures  ECG 12 Lead Documentation Only    Date/Time: 7/24/2022 12:27 PM  Performed by: Jose Rodríguez MD  Authorized by: Jose Rodríguez MD     Indications / Diagnosis:  Syncope  ECG reviewed by me, the ED Provider: yes    Patient location:  ED  Previous ECG:     Previous ECG:  Compared to current    Comparison ECG info:  5/25/19 2158    Similarity:  Changes noted  Rate:     ECG rate:  67    ECG rate assessment: normal    Rhythm:     Rhythm: sinus rhythm    QRS:     QRS axis:  Normal  Comments:      No acute ischemic changes;  diffuse T-wave inversion and ST T changes have resolved from prior EKG             ED Course  ED Course as of 07/24/22 2304   Sun Jul 24, 2022   1336 Initial accutecheck upon arrival 170 after prehospital D10 250ml after 1L  will hydrate with D5NS    1410 Prev CR 1 57  5 days ago baseline 1 04 today 1 37   1525 Repeat accucheck after D5NS  administration 285; reviewed with patient recommend observation due the fact that he had no warning with his syncope spell and exact etiology is unclear  May be related to the orthostatic hypotension verses hypoglycemic episode  Patient expressed a desire to go home I reviewed he would have to go against medical advice as etiology of his syncopal event is not clear serial troponins were 3 and 3; CT abdomen pelvis not demonstrating a concerning etiology he does have a distended bladder he was able to void here without difficulty his bowel movements are heme-negative  80 CT a/p was performed without IV contrast secondary to global shortage of IV contrast; reviewed incidental finding with patient  Patient prefers to go home reviewed since etiology of his syncopal spell was not determined any had no warning sign to indicate it was vasovagal discussed that it could be due to an arrhythmia his heart the low blood sugars or his hypotension recommend he not drive or operate heavy machinery is less likely thought to be a seizure but as the etiology is not determined recommend observation which she declines  32 61 16 Patient is aware he can return at any time  Recommend he follow-up with his [address] [facility] provider 1st available next week patient states he does not drive; discussed checking his blood sugars frequently                  Stroke Assessment     Row Name 07/24/22 1254             NIH Stroke Scale    Interval Baseline      Level of Consciousness (1a ) 0      LOC Questions (1b ) 0      LOC Commands (1c ) 0      Best Gaze (2 ) 0      Visual (3 ) 0      Facial Palsy (4 ) 0      Motor Arm, Left (5a ) 0      Motor Arm, Right (5b ) 0      Motor Leg, Left (6a ) 0      Motor Leg, Right (6b ) 0      Limb Ataxia (7 ) 0      Sensory (8 ) 0      Best Language (9 ) 0      Dysarthria (10 ) 0      Extinction and Inattention (11 ) (Formerly Neglect) 0      Total 0              Flowsheet Row Most Recent Value   TPA Decision Options    TPA Decision Patient not a TPA candidate     Patient is not a candidate options Symptoms resolved/clearly non disabling  MDM  Number of Diagnoses or Management Options  Diagnosis management comments: Mdm:  Patient with known history of orthostatic hypotension on midodrine type 2 diabetes presents with evidence of orthostatic hypotension as well as low blood sugars  Will evaluate for possibility of infection coronary artery disease there is no significant respiratory complaints presentation is not consistent with a pulmonary embolism  Patient has a history of frequent falls will evaluate for subdural hematoma acute coronary syndrome initiate hydration and supplement additional supplementation of glucose if needed and re-evaluate    [facility] note on care [facility] 7/24/22 reviewed for dx and meds      Disposition  Final diagnoses:   Syncope   Orthostatic hypotension   SUSIE (acute kidney injury) ([facility] )   Low magnesium level   Hypoglycemia due to insulin     Time reflects when diagnosis was documented in both MDM as applicable and the Disposition within this note     Time User Action Codes Description Comment    7/24/2022  2:23 PM Diannah Elders Add [R55] Syncope     7/24/2022  2:23 PM Diannah Elders Add [I95 1] Orthostatic hypotension     7/24/2022  2:37 PM Diannah Elders Add [N17 9] SUSIE (acute kidney injury) ([facility] )     7/24/2022  2:37 PM Diannah Elders Add [R79 0] Low magnesium level     7/24/2022  2:37 PM Sirena Mahajan Add [E16 0,  T38 3X5A] Hypoglycemia due to insulin       ED Disposition     ED Disposition   AMA    Condition   --    Date/Time   Sun Jul 24, 2022  3:38 PM    Comment   Date: 7/24/2022  Patient: Jeffery Caruso  Admitted: 7/24/2022 12:19 PM  Attending Provider: Chapincito Jean MD    Jeffery Caruso or his authorized caregiver has made the decision for the patient to leave the emergency department against  the advice of Dr Herson Arvizu  He or his authorized caregiver has been informed and understands the inherent risks, including death    He or his authorized caregiver has decided to accept the responsibility for this decision  Brennan Peña and all  necessary parties have been advised that he may return for further evaluation or treatment  His condition at time of discharge was stable    Brennan Peña had current vital signs as follows:  /95   Pulse 69   Temp (!) 97 3 °F (36 3 °C)  (Temporal)   Resp 13            Follow-up Information     Follow up With Specialties Details Why Contact Info    Follow up with the [place] first available appt next week              Discharge Medication List as of 7/24/2022  3:41 PM      CONTINUE these medications which have NOT CHANGED    Details   aspirin (ECOTRIN LOW STRENGTH) 81 mg EC tablet Take 81 mg by mouth daily, Historical Med      atorvastatin (LIPITOR) 10 mg tablet Take 40 mg by mouth daily , Historical Med      busPIRone (BUSPAR) 15 mg tablet Take 30 mg by mouth 2 (two) times a day, Historical Med      calcium carbonate (OS-LUIGI) 600 MG tablet Take 650 mg by mouth 2 (two) times a day with meals, Historical Med      COLESTIPOL HCL PO Take 1 g by mouth 2 (two) times a day, Historical Med      cyanocobalamin 1000 MCG tablet Take 100 mcg by mouth daily, Historical Med      Empagliflozin 25 MG TABS Take 25 mg by mouth every morning, Historical Med      ergocalciferol (ERGOCALCIFEROL) 36807 units capsule Take 50,000 Units by mouth once a week, Historical Med      ferrous sulfate 325 (65 Fe) mg tablet Take 325 mg by mouth daily with breakfast, Historical Med      folic acid (FOLVITE) 1 mg tablet Take 1 mg by mouth daily, Historical Med      gemfibrozil (LOPID) 600 mg tablet Take 600 mg by mouth daily, Historical Med      HYDROcodone-acetaminophen (NORCO) 5-325 mg per tablet Take 1 tablet by mouth every 12 (twelve) hours as needed for pain, Historical Med      insulin glargine (LANTUS) 100 units/mL subcutaneous injection Inject 30 Units under the skin 2 (two) times a day, Starting Fri 3/29/2019, Print insulin lispro (HumaLOG) 100 units/mL injection Inject 1-6 Units under the skin 3 (three) times a day before meals, Starting Fri 3/29/2019, Print      lamoTRIgine (LaMICtal) 25 mg tablet Take 25 mg by mouth daily, Historical Med      levETIRAcetam (KEPPRA) 500 mg tablet Take 500 mg by mouth every 12 (twelve) hours, Historical Med      Magnesium Oxide 400 MG CAPS Take 400 mg by mouth 2 (two) times a day, Historical Med      Melatonin 5 MG CAPS Take 5 mg by mouth daily at bedtime, Historical Med      metFORMIN (GLUCOPHAGE) 1000 MG tablet Take 1,000 mg by mouth 2 (two) times a day with meals, Historical Med      metoprolol tartrate (LOPRESSOR) 25 mg tablet Take 25 mg by mouth every 12 (twelve) hours, Historical Med      mirtazapine (REMERON) 30 mg tablet Take 45 mg by mouth daily at bedtime , Historical Med      Multiple Vitamins-Minerals (MULTIVITAMIN WITH MINERALS) tablet Take 1 tablet by mouth daily, Historical Med      omeprazole (PriLOSEC) 20 mg delayed release capsule Take 40 mg by mouth daily, Historical Med      pantoprazole (PROTONIX) 40 mg tablet Take 1 tablet (40 mg total) by mouth daily in the early morning, Starting Sat 3/30/2019, Print      potassium chloride (K-DUR,KLOR-CON) 20 mEq tablet Take 20 mEq by mouth 2 (two) times a day, Historical Med      sertraline (ZOLOFT) 100 mg tablet Take 100 mg by mouth 2 (two) times a day, Historical Med      thiamine (VITAMIN B1) 100 mg tablet Take 100 mg by mouth daily, Historical Med             No discharge procedures on file      PDMP Review     None          ED Provider  Electronically Signed by           Yareli Rincon MD  07/24/22 2122

## 2022-07-24 NOTE — DISCHARGE INSTRUCTIONS
Be careful with position changes  Recheck your blood sugar and blood pressure and magnesium level  with your doctor  No heavy machinerry use;  Plenty of fliuds  Call 911 or return to ED with any new or wosening symptoms

## 2022-07-26 LAB
ATRIAL RATE: 67 BPM
P AXIS: 72 DEGREES
PR INTERVAL: 170 MS
QRS AXIS: 69 DEGREES
QRSD INTERVAL: 90 MS
QT INTERVAL: 448 MS
QTC INTERVAL: 473 MS
T WAVE AXIS: 82 DEGREES
VENTRICULAR RATE: 67 BPM

## 2022-07-26 PROCEDURE — 93010 ELECTROCARDIOGRAM REPORT: CPT | Performed by: INTERNAL MEDICINE

## 2022-07-27 LAB — LAMOTRIGINE SERPL-MCNC: 4.5 UG/ML (ref 2–20)

## 2022-07-28 LAB — LEVETIRACETAM SERPL-MCNC: 16.4 UG/ML (ref 10–40)

## 2022-07-30 LAB
BACTERIA BLD CULT: NORMAL
BACTERIA BLD CULT: NORMAL

## 2022-10-06 ENCOUNTER — TELEPHONE (OUTPATIENT)
Dept: LAB | Facility: HOSPITAL | Age: 52
End: 2022-10-06

## 2022-10-06 NOTE — TELEPHONE ENCOUNTER
Patient has no orders in the system tired [facility]  ANA M left message will try again later

## 2022-10-11 ENCOUNTER — TRANSCRIBE ORDERS (OUTPATIENT)
Dept: LAB | Facility: HOSPITAL | Age: 52
End: 2022-10-11

## 2022-10-11 DIAGNOSIS — E11.9 TYPE 2 DIABETES MELLITUS WITHOUT COMPLICATION, UNSPECIFIED WHETHER LONG TERM INSULIN USE (HCC): Primary | ICD-10-CM

## 2022-10-17 ENCOUNTER — TELEPHONE (OUTPATIENT)
Dept: LAB | Facility: HOSPITAL | Age: 52
End: 2022-10-17

## 2022-10-21 ENCOUNTER — TRANSCRIBE ORDERS (OUTPATIENT)
Dept: LAB | Facility: HOSPITAL | Age: 52
End: 2022-10-21

## 2022-10-21 ENCOUNTER — TELEPHONE (OUTPATIENT)
Dept: LAB | Facility: HOSPITAL | Age: 52
End: 2022-10-21

## 2022-10-21 DIAGNOSIS — E11.9 TYPE 2 DIABETES MELLITUS WITHOUT COMPLICATION, UNSPECIFIED WHETHER LONG TERM INSULIN USE (HCC): Primary | ICD-10-CM

## 2022-11-01 ENCOUNTER — APPOINTMENT (OUTPATIENT)
Dept: LAB | Facility: HOSPITAL | Age: 52
End: 2022-11-01

## 2022-11-01 ENCOUNTER — OFFICE VISIT (OUTPATIENT)
Dept: CARDIOLOGY CLINIC | Facility: HOSPITAL | Age: 52
End: 2022-11-01

## 2022-11-01 VITALS
BODY MASS INDEX: 22.64 KG/M2 | WEIGHT: 176.4 LBS | HEIGHT: 74 IN | SYSTOLIC BLOOD PRESSURE: 120 MMHG | DIASTOLIC BLOOD PRESSURE: 63 MMHG | HEART RATE: 79 BPM

## 2022-11-01 DIAGNOSIS — I95.1 ORTHOSTATIC HYPOTENSION: Primary | ICD-10-CM

## 2022-11-01 DIAGNOSIS — R42 DIZZINESS: ICD-10-CM

## 2022-11-01 DIAGNOSIS — E78.2 MIXED HYPERLIPIDEMIA: ICD-10-CM

## 2022-11-01 DIAGNOSIS — E11.42 TYPE 2 DIABETES MELLITUS WITH DIABETIC POLYNEUROPATHY, WITHOUT LONG-TERM CURRENT USE OF INSULIN (HCC): ICD-10-CM

## 2022-11-01 DIAGNOSIS — E11.9 TYPE 2 DIABETES MELLITUS WITHOUT COMPLICATION, UNSPECIFIED WHETHER LONG TERM INSULIN USE (HCC): ICD-10-CM

## 2022-11-01 LAB
25(OH)D3 SERPL-MCNC: 26.3 NG/ML (ref 30–100)
ALBUMIN SERPL BCP-MCNC: 3.6 G/DL (ref 3.5–5)
ALP SERPL-CCNC: 292 U/L (ref 46–116)
ALT SERPL W P-5'-P-CCNC: 38 U/L (ref 12–78)
ANION GAP SERPL CALCULATED.3IONS-SCNC: 3 MMOL/L (ref 4–13)
AST SERPL W P-5'-P-CCNC: 34 U/L (ref 5–45)
BASOPHILS # BLD AUTO: 0.12 THOUSANDS/ÂΜL (ref 0–0.1)
BASOPHILS NFR BLD AUTO: 1 % (ref 0–1)
BILIRUB SERPL-MCNC: 0.36 MG/DL (ref 0.2–1)
BUN SERPL-MCNC: 8 MG/DL (ref 5–25)
CALCIUM SERPL-MCNC: 10 MG/DL (ref 8.3–10.1)
CHLORIDE SERPL-SCNC: 105 MMOL/L (ref 96–108)
CHOLEST SERPL-MCNC: 142 MG/DL
CO2 SERPL-SCNC: 30 MMOL/L (ref 21–32)
CREAT SERPL-MCNC: 1.21 MG/DL (ref 0.6–1.3)
EOSINOPHIL # BLD AUTO: 0.58 THOUSAND/ÂΜL (ref 0–0.61)
EOSINOPHIL NFR BLD AUTO: 5 % (ref 0–6)
ERYTHROCYTE [DISTWIDTH] IN BLOOD BY AUTOMATED COUNT: 12.7 % (ref 11.6–15.1)
EST. AVERAGE GLUCOSE BLD GHB EST-MCNC: 194 MG/DL
GFR SERPL CREATININE-BSD FRML MDRD: 67 ML/MIN/1.73SQ M
GLUCOSE P FAST SERPL-MCNC: 96 MG/DL (ref 65–99)
HBA1C MFR BLD: 8.4 %
HCT VFR BLD AUTO: 43.1 % (ref 36.5–49.3)
HDLC SERPL-MCNC: 34 MG/DL
HGB BLD-MCNC: 13.8 G/DL (ref 12–17)
IMM GRANULOCYTES # BLD AUTO: 0.04 THOUSAND/UL (ref 0–0.2)
IMM GRANULOCYTES NFR BLD AUTO: 0 % (ref 0–2)
LDLC SERPL CALC-MCNC: 62 MG/DL (ref 0–100)
LYMPHOCYTES # BLD AUTO: 5.17 THOUSANDS/ÂΜL (ref 0.6–4.47)
LYMPHOCYTES NFR BLD AUTO: 42 % (ref 14–44)
MAGNESIUM SERPL-MCNC: 1.8 MG/DL (ref 1.6–2.6)
MCH RBC QN AUTO: 29.5 PG (ref 26.8–34.3)
MCHC RBC AUTO-ENTMCNC: 32 G/DL (ref 31.4–37.4)
MCV RBC AUTO: 92 FL (ref 82–98)
MONOCYTES # BLD AUTO: 1.06 THOUSAND/ÂΜL (ref 0.17–1.22)
MONOCYTES NFR BLD AUTO: 9 % (ref 4–12)
NEUTROPHILS # BLD AUTO: 5.24 THOUSANDS/ÂΜL (ref 1.85–7.62)
NEUTS SEG NFR BLD AUTO: 43 % (ref 43–75)
NONHDLC SERPL-MCNC: 108 MG/DL
NRBC BLD AUTO-RTO: 0 /100 WBCS
PLATELET # BLD AUTO: 438 THOUSANDS/UL (ref 149–390)
PMV BLD AUTO: 10.2 FL (ref 8.9–12.7)
POTASSIUM SERPL-SCNC: 5 MMOL/L (ref 3.5–5.3)
PROT SERPL-MCNC: 8.1 G/DL (ref 6.4–8.4)
RBC # BLD AUTO: 4.68 MILLION/UL (ref 3.88–5.62)
SODIUM SERPL-SCNC: 138 MMOL/L (ref 135–147)
TRIGL SERPL-MCNC: 231 MG/DL
VIT B12 SERPL-MCNC: 2420 PG/ML (ref 100–900)
WBC # BLD AUTO: 12.21 THOUSAND/UL (ref 4.31–10.16)

## 2022-11-01 RX ORDER — MIDODRINE HYDROCHLORIDE 10 MG/1
10 TABLET ORAL 2 TIMES DAILY
Qty: 90 TABLET | Refills: 3
Start: 2022-11-01

## 2022-11-01 NOTE — PROGRESS NOTES
Cardiology Consultation     Romana Ralph  [id number]  1970  [address] [place] [address] [facility] [place] [address] [address]at [address]imsv[address] [place] [address] Road [id number]      There are no diagnoses linked to this encounter  I had the pleasure of seeing Romana Ralph for a consultation regarding orthostatic hypotension requested by Jere Caballero MD    History of the Presenting Illness, Discussion/Summary and my Plan are as follows:::    79-year-old with a history of longstanding diabetes since 2007, with poor control, has neuropathy involving both upper extremities from the elbows down words as well as lower extremities from the knees down words, with diabetes control having become more difficult 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  History is also significant for PTSD and seizures-last episode in 2007, stable on Keppra and Lamictal   He also has a history of myositis/rhabdomyolysis (CTA level of 402) for which a quadriceps muscle biopsy was performed in July 2020 (he originally presented to [facility] with a foot ulcer and ultimately discharged on antibiotics per Podiatry) then within a few days, admitted to the [place] with falls and elevated creatinine kinase,, HMG Co a antibody was negative,, antibodies were not detected,  Although rheumatology note  stated myositis with differential diagnosis includes inflammatory myopathy, neuropathy 2nd diabetes and alcohol-July 2020  He tells me that ultimately this was attributed to the statins  At baseline he is not very active, gets around with a cane, limited by neuropathy knee arthritis and back pain  He has to chair lifts in the house to go up stairs  No symptoms with this level of activity  He does not drink at all at this time  No tobacco use  No drug use      No FH of heart disease in mother, does not know his father  One sibling might have a 'heart condition  He also underwent coronary angiography around 2018 that apparently showed nonobstructive CAD, no stents were placed  His only complaint is orthostatic hypotension, despite drinking close to 60-80 oz of Gatorade a day, drinks no coffee or water otherwise, does not notice a change in his dizziness with hyperglycemia  Blood pressures  Supine 124/80  Sitting 100/70  Standing 120/80    Plan:    Orthostatic hypotension:  He is currently on midodrine 10 mg twice daily, he is not on metoprolol and hence I removed it from his medications  I told him that he does have the option of going up on midodrine to 10 mg 3 times daily  It appears that his orthostatic symptoms while relatively stable, due impaired his lifestyle  In addition he also has severe neuropathy involving his upper extremities, cannot even feel what he is holding  Compression stockings are an option as well although he has a device that he can use to pull them up, will not be easy with these  For now will not order compression stockings  Florinef is another option for the future  Will check an echo, it appears that he might have had an echo in 2017 although no report is available  Diabetes:  Chronically poor control, has severe neuropathy involving the upper and lower extremities, ever since he had a partial pancreatectomy, control has been even harder, he does see an endocrinologist   Will need better control long-term to prevent his neuropathy fron progressing    Hypertriglyceridemia/mixed dyslipidemia:  See below, it appears that his triglycerides do track his hemoglobin A1c  Will recheck a lipid profile with his next A1c  Gemfibrozil does show up on his medication list although he is not sure and thinks he might not be on it  He will bring his medications to his next visit  Last triglyceride level was low but he was in the hospital with hypotension at that time    He did have a pancreatic pseudocyst and a partial pancreatectomy but does not recall a prior history of pancreatitis  He did have a remote history of alcohol abuse although does not drink at all now and overall has curtail his drinking significantly for at least 10 years  History of elevated CK/Myositis/Rhabdomyolysis:  Complicated history-reviewed from the [place], Not sure what statin he was on, it appears that overall the rhabdomyolysis with a CK level of 400 but with diffuse myalgias, was inconclusive, with biopsy negative for HMG Co a antibodies  He has not been on a statin since  Follow-up in about 3 months       Latest Reference Range & Units 07/24/22 13:01 07/24/22 14:58   Total CK 39 - 308 U/L 73    hs TnI 0hr "Refer to ACS Flowchart"- see link ng/L 3    hs TnI 2hr "Refer to ACS Flowchart"- see link ng/L  3   Delta 2hr hsTnI <20 ng/L  0      Latest Reference Range & Units Most Recent 04/05/21 07:35 01/04/22 08:05 07/19/22 07:22   Cholesterol See Comment mg/dL 135  07/19/22 07:22 235 (H) 258 (H) 135   Triglycerides See Comment mg/dL 278 (H)  07/19/22 07:22 1,288 (H)  A1c of 12 444 (H)  A1c of 10 8 278 (H)  A1c of 8 4   HDL >=40 mg/dL 32 (L)  07/19/22 07:22 15 (L) 35 (L) 32 (L)   Non-HDL Cholesterol mg/dl 103  07/19/22 07:22 220 223 103   LDL Calculated 0 - 100 mg/dL 47  07/19/22 07:22 See Comment See Comment 47   (H): Data is abnormally high  (L): Data is abnormally low     Latest Reference Range & Units Most Recent 02/12/18 23:25 02/14/18 05:24 06/27/20 06:40 04/05/21 07:35 01/04/22 08:05 07/19/22 07:22   Hemoglobin A1C N 8 4 (H)  07/19/22 07:22 10 0 (H) (E) 10 1 (H) (E) 8 6 (H) 12 4 (H) 10 8 (H) 8 4 (H)   (H): Data is abnormally high  (E): External lab result   Latest Reference Range & Units 07/19/22 07:22 07/24/22 13:01   Hemoglobin A1C  8 4 (H)    eAG, EST AVG Glucose mg/dl 194    TSH 3RD GENERATON 0 450 - 4 500 uIU/mL 6 130 (H) 3 106   (H): Data is abnormally high     Latest Reference Range & Units 04/05/21 07:35 01/04/22 08:05 07/19/22 07:22 07/24/22 13:01   BUN 5 - 25 mg/dL 8 8 8 8   Creatinine 0 60 - 1 30 mg/dL 1 04 0 94 1 51 (H) 1 37 (H)   (H): Data is abnormally high  No diagnosis found  Patient Active Problem List   Diagnosis   • Alcoholic ketoacidosis   • Type 2 diabetes mellitus with diabetic polyneuropathy, without long-term current use of insulin (HCC)   • Chronic alcohol abuse   • SUSIE (acute kidney injury) ([place] [address] )   • Gastrointestinal hemorrhage with hematemesis   • Diabetic ulcer of toe of right foot associated with type 2 diabetes mellitus (HCC)   • Sepsis, unspecified organism ([place] [address] )   • Seizure disorder ([facility])   • PTSD (post-traumatic stress disorder)     Past Medical History:   Diagnosis Date   • Alcoholic hepatitis    • Anxiety    • Chronic liver disease    • Depression    • Diabetes ([place] [address] )    • Diabetes mellitus ([place] [address] )    • Diabetic ulcer of toe of right foot associated with type 2 diabetes mellitus ([place] [address] ) 06/27/2020   • Diabetic ulcer of toe of right foot associated with type 2 diabetes mellitus, with fat layer exposed ([place] [address] ) 06/27/2020   • Iron deficiency anemia    • Lactic acidosis 06/27/2020   • Pancreatitis    • PTSD (post-traumatic stress disorder)    • Seizure ([place] [address] )    • Seizure disorder ([place] [address] ) 06/27/2020   • Sepsis, unspecified organism ([place] [address] ) 06/27/2020   • Type 2 diabetes mellitus with diabetic polyneuropathy, without long-term current use of insulin ([place] [address] ) 03/25/2019     Social History     Socioeconomic History   • Marital status:      Spouse name: Not on file   • Number of children: Not on file   • Years of education: Not on file   • Highest education level: Not on file   Occupational History   • Not on file   Tobacco Use   • Smoking status: Never Smoker   • Smokeless tobacco: Never Used   Vaping Use   • Vaping Use: Never used   Substance and Sexual Activity   • Alcohol use:  Yes     Alcohol/week: 15 0 standard drinks     Types: 15 Cans of beer per week     Comment: 15 cans beer daily   • Drug use: Never   • Sexual activity: Not on file   Other Topics Concern   • Not on file   Social History Narrative   • Not on file     Social Determinants of Health     Financial Resource Strain: Not on file   Food Insecurity: Not on file   Transportation Needs: Not on file   Physical Activity: Not on file   Stress: Not on file   Social Connections: Not on file   Intimate Partner Violence: Not on file   Housing Stability: Not on file      History reviewed  No pertinent family history    Past Surgical History:   Procedure Laterality Date   • ABDOMINAL SURGERY     • CARDIAC CATHETERIZATION     • PANCREATECTOMY     • SPLENECTOMY     • TONSILLECTOMY         Current Outpatient Medications:   •  busPIRone (BUSPAR) 15 mg tablet, Take 30 mg by mouth 2 (two) times a day, Disp: , Rfl:   •  calcium carbonate (OS-LUIGI) 600 MG tablet, Take 650 mg by mouth 2 (two) times a day with meals, Disp: , Rfl:   •  cyanocobalamin 1000 MCG tablet, Take 100 mcg by mouth daily, Disp: , Rfl:   •  Empagliflozin 25 MG TABS, Take 25 mg by mouth every morning, Disp: , Rfl:   •  ergocalciferol (ERGOCALCIFEROL) 52517 units capsule, Take 50,000 Units by mouth once a week, Disp: , Rfl:   •  ferrous sulfate 325 (65 Fe) mg tablet, Take 325 mg by mouth daily with breakfast, Disp: , Rfl:   •  folic acid (FOLVITE) 1 mg tablet, Take 1 mg by mouth daily, Disp: , Rfl:   •  gemfibrozil (LOPID) 600 mg tablet, Take 600 mg by mouth daily, Disp: , Rfl:   •  HYDROcodone-acetaminophen (NORCO) 5-325 mg per tablet, Take 1 tablet by mouth every 12 (twelve) hours as needed for pain, Disp: , Rfl:   •  insulin glargine (LANTUS) 100 units/mL subcutaneous injection, Inject 30 Units under the skin 2 (two) times a day, Disp: 20 mL, Rfl: 0  •  insulin lispro (HumaLOG) 100 units/mL injection, Inject 1-6 Units under the skin 3 (three) times a day before meals, Disp: 10 mL, Rfl: 0  •  lamoTRIgine (LaMICtal) 25 mg tablet, Take 25 mg by mouth daily, Disp: , Rfl:   •  levETIRAcetam (KEPPRA) 500 mg tablet, Take 500 mg by mouth every 12 (twelve) hours, Disp: , Rfl:   •  Magnesium Oxide 400 MG CAPS, Take 400 mg by mouth 2 (two) times a day, Disp: , Rfl:   •  Melatonin 5 MG CAPS, Take 5 mg by mouth daily at bedtime, Disp: , Rfl:   •  metFORMIN (GLUCOPHAGE) 1000 MG tablet, Take 1,000 mg by mouth 2 (two) times a day with meals, Disp: , Rfl:   •  metoprolol tartrate (LOPRESSOR) 25 mg tablet, Take 25 mg by mouth every 12 (twelve) hours, Disp: , Rfl:   •  mirtazapine (REMERON) 30 mg tablet, Take 45 mg by mouth daily at bedtime , Disp: , Rfl:   •  Multiple Vitamins-Minerals (MULTIVITAMIN WITH MINERALS) tablet, Take 1 tablet by mouth daily, Disp: , Rfl:   •  omeprazole (PriLOSEC) 20 mg delayed release capsule, Take 20 mg by mouth daily, Disp: , Rfl:   •  potassium chloride (K-DUR,KLOR-CON) 20 mEq tablet, Take 20 mEq by mouth 2 (two) times a day, Disp: , Rfl:   •  sertraline (ZOLOFT) 100 mg tablet, Take 100 mg by mouth 2 (two) times a day, Disp: , Rfl:   •  thiamine (VITAMIN B1) 100 mg tablet, Take 100 mg by mouth daily, Disp: , Rfl:   •  aspirin (ECOTRIN LOW STRENGTH) 81 mg EC tablet, Take 81 mg by mouth daily (Patient not taking: Reported on 11/1/2022), Disp: , Rfl:   •  atorvastatin (LIPITOR) 10 mg tablet, Take 40 mg by mouth daily  (Patient not taking: Reported on 11/1/2022), Disp: , Rfl:   •  COLESTIPOL HCL PO, Take 1 g by mouth 2 (two) times a day, Disp: , Rfl:   •  pantoprazole (PROTONIX) 40 mg tablet, Take 1 tablet (40 mg total) by mouth daily in the early morning (Patient not taking: No sig reported), Disp: 30 tablet, Rfl: 0  No Known Allergies  Vitals:    11/01/22 1230   BP: 120/63   Pulse: 79   Weight: 80 kg (176 lb 6 4 oz)   Height: 6' 2" (1 88 m)         Imaging: No results found  Review of Systems:  Review of Systems   Constitutional: Negative  HENT: Negative  Eyes: Negative  Respiratory: Negative  Cardiovascular: Negative  Gastrointestinal: Negative      Musculoskeletal: Positive for arthralgias, back pain and gait problem  Neurological: Positive for dizziness  Physical Exam:    /63   Pulse 79   Ht 6' 2" (1 88 m)   Wt 80 kg (176 lb 6 4 oz)   BMI 22 65 kg/m²   Physical Exam  Constitutional:       General: He is not in acute distress  Appearance: Normal appearance  He is not ill-appearing  HENT:      Nose: Nose normal  No congestion or rhinorrhea  Mouth/Throat:      Mouth: Mucous membranes are moist       Pharynx: Oropharynx is clear  No posterior oropharyngeal erythema  Neck:      Vascular: No carotid bruit  Cardiovascular:      Rate and Rhythm: Normal rate and regular rhythm  Pulses: Normal pulses  Heart sounds: No murmur heard  No friction rub  Pulmonary:      Effort: No respiratory distress  Breath sounds: No stridor  No wheezing or rhonchi  Musculoskeletal:         General: No swelling, tenderness, deformity or signs of injury  Normal range of motion  Cervical back: Normal range of motion  No rigidity or tenderness  Lymphadenopathy:      Cervical: No cervical adenopathy  Neurological:      Mental Status: He is alert  This note was completed in part utilizing [employer] direct voice recognition software  Grammatical errors, random word insertion, spelling mistakes, occasional wrong word or "sound-alike" substitutions and incomplete sentences may be an occasional consequence of the system secondary to software limitations, ambient noise and hardware issues  At the time of dictation, efforts were made to edit, clarify and /or correct errors  Please read the chart carefully and recognize, using context, where substitutions have occurred  If you have any questions or concerns about the context, text or information contained within the body of this dictation, please contact myself, the provider, for further clarification

## 2022-11-01 NOTE — PATIENT INSTRUCTIONS
To avoid dizzy spells the following precautions should be taken to minimize potential triggers and minimize potential harm  Dizziness is usually due to the brain not getting enough blood supply due to a transient lowering of your blood pressure  Try to lie down or at least sit down at the first sign of dizziness  After lying down or sitting down, elevating your legs will further help to restore blood supply to the brain  Drink a glass of ice cold water in the morning prior to waking up from be  (sleep with a flask of ice cold water at night)  Adequate hydration with water (75 ounces) or Gatorade (upto 50 ounces)  Increase salt intake in the diet (chips, pretzels etc)  Avoid caffeinated beverages  The following ‘Counterpressure maneuvers’ may stop or delay a passing out spell long enough for you to lie down    Leg pumping and let crossing while tensing the leg, abdominal and buttock muscles  Hand gripping like gripping a rubber ball or similar object as hard as possible  Tensing your arms with clenched fists  Arm Tensing: gripping one hand with the other simultaneously moving arms away from the body  Elevating the head end of the bed at night - 1 brick under each of the head end bed posts might help

## 2022-12-22 ENCOUNTER — TRANSCRIBE ORDERS (OUTPATIENT)
Dept: LAB | Facility: HOSPITAL | Age: 52
End: 2022-12-22

## 2022-12-22 DIAGNOSIS — I25.10 ASCVD (ARTERIOSCLEROTIC CARDIOVASCULAR DISEASE): ICD-10-CM

## 2022-12-22 DIAGNOSIS — F10.129: ICD-10-CM

## 2022-12-22 DIAGNOSIS — Z91.119 NONCOMPLIANCE OF PATIENT WITH DIETARY REGIMEN: ICD-10-CM

## 2022-12-22 DIAGNOSIS — G40.89 OTHER SEIZURES (HCC): ICD-10-CM

## 2022-12-22 DIAGNOSIS — E11.42 TYPE 2 DIABETES MELLITUS WITH DIABETIC POLYNEUROPATHY, WITHOUT LONG-TERM CURRENT USE OF INSULIN (HCC): Primary | Chronic | ICD-10-CM

## 2022-12-22 DIAGNOSIS — F43.10 PTSD (POST-TRAUMATIC STRESS DISORDER): Chronic | ICD-10-CM

## 2022-12-22 DIAGNOSIS — I95.9 HYPOTENSION, UNSPECIFIED HYPOTENSION TYPE: ICD-10-CM

## 2022-12-22 DIAGNOSIS — E78.2 MIXED HYPERLIPIDEMIA: ICD-10-CM

## 2022-12-22 DIAGNOSIS — K21.9 GASTROESOPHAGEAL REFLUX DISEASE WITHOUT ESOPHAGITIS: ICD-10-CM

## 2022-12-22 DIAGNOSIS — E11.65 TYPE 2 DIABETES MELLITUS WITH HYPERGLYCEMIA, UNSPECIFIED WHETHER LONG TERM INSULIN USE (HCC): ICD-10-CM

## 2022-12-22 DIAGNOSIS — D50.9 IRON DEFICIENCY ANEMIA, UNSPECIFIED IRON DEFICIENCY ANEMIA TYPE: ICD-10-CM

## 2022-12-22 DIAGNOSIS — G72.1: ICD-10-CM

## 2022-12-22 DIAGNOSIS — F32.A DEPRESSIVE DISORDER: ICD-10-CM

## 2022-12-22 DIAGNOSIS — F41.0 PANIC DISORDER WITHOUT AGORAPHOBIA: ICD-10-CM

## 2022-12-22 DIAGNOSIS — R94.5 ABNORMAL RESULTS OF LIVER FUNCTION STUDIES: ICD-10-CM

## 2023-01-23 ENCOUNTER — TELEPHONE (OUTPATIENT)
Dept: LAB | Facility: HOSPITAL | Age: 53
End: 2023-01-23

## 2023-01-23 NOTE — TELEPHONE ENCOUNTER
1/23 - scheduled for 3/21 - we did receive  a script from the [facility] - bloodwork to be done on 3/21

## 2023-02-08 ENCOUNTER — OFFICE VISIT (OUTPATIENT)
Dept: INTERNAL MEDICINE CLINIC | Facility: CLINIC | Age: 53
End: 2023-02-08

## 2023-02-08 VITALS
DIASTOLIC BLOOD PRESSURE: 58 MMHG | HEIGHT: 72 IN | TEMPERATURE: 98.4 F | HEART RATE: 84 BPM | BODY MASS INDEX: 22.62 KG/M2 | OXYGEN SATURATION: 98 % | WEIGHT: 167 LBS | SYSTOLIC BLOOD PRESSURE: 122 MMHG

## 2023-02-08 DIAGNOSIS — I95.1 ORTHOSTATIC HYPOTENSION: ICD-10-CM

## 2023-02-08 DIAGNOSIS — I25.10 ARTERIOSCLEROSIS OF CORONARY ARTERY: ICD-10-CM

## 2023-02-08 DIAGNOSIS — F43.10 PTSD (POST-TRAUMATIC STRESS DISORDER): Chronic | ICD-10-CM

## 2023-02-08 DIAGNOSIS — Z23 NEED FOR PNEUMOCOCCAL VACCINATION: ICD-10-CM

## 2023-02-08 DIAGNOSIS — E11.42 TYPE 2 DIABETES MELLITUS WITH DIABETIC POLYNEUROPATHY, WITHOUT LONG-TERM CURRENT USE OF INSULIN (HCC): Primary | Chronic | ICD-10-CM

## 2023-02-08 DIAGNOSIS — Z23 NEED FOR INFLUENZA VACCINATION: ICD-10-CM

## 2023-02-08 DIAGNOSIS — G40.909 SEIZURE DISORDER (HCC): Chronic | ICD-10-CM

## 2023-02-08 DIAGNOSIS — E78.2 MIXED HYPERLIPIDEMIA: ICD-10-CM

## 2023-02-08 DIAGNOSIS — F10.10 CHRONIC ALCOHOL ABUSE: ICD-10-CM

## 2023-02-08 PROBLEM — F41.9 ANXIETY: Status: ACTIVE | Noted: 2017-02-25

## 2023-02-08 PROBLEM — F10.90 ALCOHOL INTAKE ABOVE RECOMMENDED SENSIBLE LIMITS: Status: ACTIVE | Noted: 2023-02-08

## 2023-02-08 LAB — SL AMB POCT HEMOGLOBIN AIC: 10.2 (ref ?–6.5)

## 2023-02-08 NOTE — PROGRESS NOTES
Name: Mando Munoz      : 1970      MRN: [id number]  Encounter Provider: ZEFERINO Kenney  Encounter Date: 2023   Encounter department: [address] [place] [address]  A1C is at 10 2  Continues to follow up with the [facility] for psychiatry and Endo  Deferring a urine microalbumin  Will obtain colonoscopy report  Foot exam is abnormal deferring follow up with Podiatry  Continues to see Cardiology  Will call once home regarding what medications he does need filled  He is unsure in office  Will follow back up in 3 months or sooner if need be  1  Type 2 diabetes mellitus with diabetic polyneuropathy, without long-term current use of insulin (HCC)  -     POCT hemoglobin A1c    2  Chronic alcohol abuse    3  Mixed hyperlipidemia    4  Arteriosclerosis of coronary artery    5  Orthostatic hypotension    6  PTSD (post-traumatic stress disorder)    7  Seizure disorder ([address] )    8  Need for influenza vaccination  -     influenza vaccine, high-dose, PF 0 7 mL (FLUZONE HIGH-DOSE)    9  Need for pneumococcal vaccination  -     PNEUMOCOCCAL POLYSACCHARIDE VACCINE 23-VALENT =>3YO SQ IM           Subjective      Riddhi Just is to establish care  [facility] a [facility]  He does see Endo and Psychiatry through them  He is seeing Cardiology and does see them again in March  He states he was in and out of the hospital for numerous medication issues  He has neuropathy from the knees down and can not feel anything  He has not seen Podiatry since being in the [facility]  He does have the mobile labs come out to do his labs  He is scheduled for March  He is deferring any urine tests  He denies any chest pain, SOB, or palpitations  He does see the eye doctor through the [facility]  He did have a colonoscopy done in   He is having chronic diarrhea and would like something called in for this      Review of Systems    Current Outpatient Medications on File Prior to Visit   Medication Sig   • busPIRone (BUSPAR) 15 mg tablet Take 30 mg by mouth 2 (two) times a day   • cyanocobalamin 1000 MCG tablet Take 100 mcg by mouth daily   • ergocalciferol (ERGOCALCIFEROL) 65671 units capsule Take 50,000 Units by mouth once a week   • ferrous sulfate 325 (65 Fe) mg tablet Take 325 mg by mouth daily with breakfast   • folic acid (FOLVITE) 1 mg tablet Take 1 mg by mouth daily   • lamoTRIgine (LaMICtal) 25 mg tablet Take 25 mg by mouth daily   • levETIRAcetam (KEPPRA) 500 mg tablet Take 500 mg by mouth every 12 (twelve) hours   • Magnesium Oxide 400 MG CAPS Take 400 mg by mouth 2 (two) times a day   • Melatonin 5 MG CAPS Take 5 mg by mouth daily at bedtime   • metFORMIN (GLUCOPHAGE) 1000 MG tablet Take 1,000 mg by mouth 2 (two) times a day with meals   • midodrine (PROAMATINE) 10 MG tablet Take 1 tablet (10 mg total) by mouth 2 (two) times a day   • mirtazapine (REMERON) 30 mg tablet Take 45 mg by mouth daily at bedtime    • Multiple Vitamins-Minerals (MULTIVITAMIN WITH MINERALS) tablet Take 1 tablet by mouth daily   • omeprazole (PriLOSEC) 20 mg delayed release capsule Take 20 mg by mouth daily   • pantoprazole (PROTONIX) 40 mg tablet Take 1 tablet (40 mg total) by mouth daily in the early morning (Patient taking differently: Take 20 mg by mouth daily in the early morning)   • potassium chloride (K-DUR,KLOR-CON) 20 mEq tablet Take 20 mEq by mouth 2 (two) times a day   • sertraline (ZOLOFT) 100 mg tablet Take 100 mg by mouth 2 (two) times a day   • thiamine (VITAMIN B1) 100 mg tablet Take 100 mg by mouth daily   • calcium carbonate (OS-LUIGI) 600 MG tablet Take 650 mg by mouth 2 (two) times a day with meals   • COLESTIPOL HCL PO Take 1 g by mouth 2 (two) times a day   • Empagliflozin 25 MG TABS Take 25 mg by mouth every morning   • gemfibrozil (LOPID) 600 mg tablet Take 600 mg by mouth daily   • HYDROcodone-acetaminophen (NORCO) 5-325 mg per tablet Take 1 tablet by mouth every 12 (twelve) hours as needed for pain • insulin glargine (LANTUS) 100 units/mL subcutaneous injection Inject 30 Units under the skin 2 (two) times a day   • insulin lispro (HumaLOG) 100 units/mL injection Inject 1-6 Units under the skin 3 (three) times a day before meals     Patient's shoes and socks removed  Right Foot/Ankle   Right Foot Inspection  Skin Exam: skin normal and skin intact  No dry skin, no warmth, no callus, no erythema, no maceration, no abnormal color, no pre-ulcer, no ulcer and no callus  Toe Exam: ROM and strength within normal limits  Sensory   Vibration: diminished  Proprioception: diminished  Monofilament testing: diminished    Vascular  Capillary refills: < 3 seconds  The right DP pulse is 2+  The right PT pulse is 2+  Left Foot/Ankle  Left Foot Inspection  Skin Exam: skin normal and skin intact  No dry skin, no warmth, no erythema, no maceration, normal color, no pre-ulcer, no ulcer and no callus  Toe Exam: ROM and strength within normal limits  Sensory   Vibration: diminished  Proprioception: diminished  Monofilament testing: diminished    Vascular  Capillary refills: < 3 seconds  The left DP pulse is 2+  The left PT pulse is 2+  Assign Risk Category  No deformity present  No loss of protective sensation  No weak pulses  Risk: 1        Objective     /58   Pulse 84   Temp 98 4 °F (36 9 °C) (Temporal)   Ht 6' (1 829 m)   Wt 75 8 kg (167 lb)   SpO2 98%   BMI 22 65 kg/m²     Physical Exam  Cardiovascular:      Pulses: no weak pulses          Dorsalis pedis pulses are 2+ on the right side and 2+ on the left side  Posterior tibial pulses are 2+ on the right side and 2+ on the left side  Musculoskeletal:        Feet:    Feet:      Right foot:      Skin integrity: No ulcer, skin breakdown, erythema, warmth, callus or dry skin  Left foot:      Skin integrity: No ulcer, skin breakdown, erythema, warmth, callus or dry skin         Jenaro John

## 2023-02-09 ENCOUNTER — TELEPHONE (OUTPATIENT)
Dept: ADMINISTRATIVE | Facility: OTHER | Age: 53
End: 2023-02-09

## 2023-02-09 NOTE — TELEPHONE ENCOUNTER
Upon review of the In Basket request and the patient's chart, initial outreach has been made via fax to facility  Please see Contacts section for details       Thank you  Janelle Garcia MA

## 2023-02-09 NOTE — TELEPHONE ENCOUNTER
Upon review of the In Basket request we were able to locate, review, and update the patient chart as requested for CRC: Colonoscopy  Any additional questions or concerns should be emailed to the Practice Liaisons via the appropriate education email address, please do not reply via In Basket      Thank you  Ginette Jackson MA

## 2023-02-09 NOTE — LETTER
Procedure Request Form: Colonoscopy      Date Requested: 23  Patient: Lukas Clarity  Patient : 1970   Referring Provider: Georgie Andrews        Date of Procedure ______________________________       The above patient has informed us that they have completed their   most recent Colonoscopy at your facility  Please complete   this form and attach all corresponding procedure reports/results  Comments __DOS 2020_____________________________________________  ____________________________________________________________________  ____________________________________________________________________  ____________________________________________________________________    Facility Completing Procedure _________________________________________    Form Completed By (print name) _______________________________________      Signature __________________________________________________________      These reports are needed for  compliance  Please fax this completed form and a copy of the procedure report to our office located at [place] [address] as soon as possible to Fax [phone] valentina Palmer: Phone [phone]    We thank you for your assistance in treating our mutual patient

## 2023-02-09 NOTE — TELEPHONE ENCOUNTER
----- Message from [address] [place] [address] sent at 2/8/2023  2:23 PM EST -----  Can you please result colonoscopy report in  on 7/17/2020 thanks    Jaquan Rowan

## 2023-02-13 ENCOUNTER — TELEPHONE (OUTPATIENT)
Dept: INTERNAL MEDICINE CLINIC | Facility: CLINIC | Age: 53
End: 2023-02-13

## 2023-02-13 DIAGNOSIS — I95.1 ORTHOSTATIC HYPOTENSION: ICD-10-CM

## 2023-02-13 NOTE — TELEPHONE ENCOUNTER
Aissatou Bradley called to let you know that he will be needing refills on some of his meds soon     gabipentin 300 mg  Midodrine 10  ibuprophen  Ferrous sullfate

## 2023-02-14 NOTE — TELEPHONE ENCOUNTER
Patient not taking gabipentin   Modidrine 10 mg 1 tab 2 times daily   No motrin or ibuprophen or motrin in chart   Ferrous sulfate 325 mg at breakfast

## 2023-03-21 ENCOUNTER — APPOINTMENT (OUTPATIENT)
Dept: LAB | Facility: HOSPITAL | Age: 53
End: 2023-03-21

## 2023-03-21 DIAGNOSIS — I95.9 HYPOTENSION, UNSPECIFIED HYPOTENSION TYPE: ICD-10-CM

## 2023-03-21 DIAGNOSIS — G40.89 OTHER SEIZURES (HCC): ICD-10-CM

## 2023-03-21 DIAGNOSIS — F10.129: ICD-10-CM

## 2023-03-21 DIAGNOSIS — F43.10 PTSD (POST-TRAUMATIC STRESS DISORDER): Chronic | ICD-10-CM

## 2023-03-21 DIAGNOSIS — E78.2 MIXED HYPERLIPIDEMIA: ICD-10-CM

## 2023-03-21 DIAGNOSIS — E11.42 TYPE 2 DIABETES MELLITUS WITH DIABETIC POLYNEUROPATHY, WITHOUT LONG-TERM CURRENT USE OF INSULIN (HCC): Chronic | ICD-10-CM

## 2023-03-21 DIAGNOSIS — D50.9 IRON DEFICIENCY ANEMIA, UNSPECIFIED IRON DEFICIENCY ANEMIA TYPE: ICD-10-CM

## 2023-03-21 DIAGNOSIS — Z91.119 NONCOMPLIANCE OF PATIENT WITH DIETARY REGIMEN: ICD-10-CM

## 2023-03-21 DIAGNOSIS — R94.5 ABNORMAL RESULTS OF LIVER FUNCTION STUDIES: ICD-10-CM

## 2023-03-21 DIAGNOSIS — E11.65 TYPE 2 DIABETES MELLITUS WITH HYPERGLYCEMIA, UNSPECIFIED WHETHER LONG TERM INSULIN USE (HCC): ICD-10-CM

## 2023-03-21 DIAGNOSIS — G72.1: ICD-10-CM

## 2023-03-21 DIAGNOSIS — K21.9 GASTROESOPHAGEAL REFLUX DISEASE WITHOUT ESOPHAGITIS: ICD-10-CM

## 2023-03-21 DIAGNOSIS — F41.0 PANIC DISORDER WITHOUT AGORAPHOBIA: ICD-10-CM

## 2023-03-21 DIAGNOSIS — F32.A DEPRESSIVE DISORDER: ICD-10-CM

## 2023-03-21 DIAGNOSIS — I25.10 ASCVD (ARTERIOSCLEROTIC CARDIOVASCULAR DISEASE): ICD-10-CM

## 2023-03-21 LAB
25(OH)D3 SERPL-MCNC: 19.2 NG/ML (ref 30–100)
ALBUMIN SERPL BCP-MCNC: 3.3 G/DL (ref 3.5–5)
ALP SERPL-CCNC: 182 U/L (ref 46–116)
ALT SERPL W P-5'-P-CCNC: 22 U/L (ref 12–78)
ANION GAP SERPL CALCULATED.3IONS-SCNC: 1 MMOL/L (ref 4–13)
AST SERPL W P-5'-P-CCNC: 28 U/L (ref 5–45)
BILIRUB SERPL-MCNC: 0.57 MG/DL (ref 0.2–1)
BUN SERPL-MCNC: 13 MG/DL (ref 5–25)
CALCIUM ALBUM COR SERPL-MCNC: 9.7 MG/DL (ref 8.3–10.1)
CALCIUM SERPL-MCNC: 9.1 MG/DL (ref 8.3–10.1)
CHLORIDE SERPL-SCNC: 108 MMOL/L (ref 96–108)
CHOLEST SERPL-MCNC: 158 MG/DL
CO2 SERPL-SCNC: 27 MMOL/L (ref 21–32)
CREAT SERPL-MCNC: 1.33 MG/DL (ref 0.6–1.3)
ERYTHROCYTE [DISTWIDTH] IN BLOOD BY AUTOMATED COUNT: 13.2 % (ref 11.6–15.1)
GFR SERPL CREATININE-BSD FRML MDRD: 60 ML/MIN/1.73SQ M
GLUCOSE P FAST SERPL-MCNC: 167 MG/DL (ref 65–99)
HCT VFR BLD AUTO: 37.8 % (ref 36.5–49.3)
HDLC SERPL-MCNC: 39 MG/DL
HGB BLD-MCNC: 12.7 G/DL (ref 12–17)
LDLC SERPL CALC-MCNC: 61 MG/DL (ref 0–100)
MAGNESIUM SERPL-MCNC: 1.6 MG/DL (ref 1.6–2.6)
MCH RBC QN AUTO: 31.4 PG (ref 26.8–34.3)
MCHC RBC AUTO-ENTMCNC: 33.6 G/DL (ref 31.4–37.4)
MCV RBC AUTO: 93 FL (ref 82–98)
NONHDLC SERPL-MCNC: 119 MG/DL
PLATELET # BLD AUTO: 301 THOUSANDS/UL (ref 149–390)
PMV BLD AUTO: 11.1 FL (ref 8.9–12.7)
POTASSIUM SERPL-SCNC: 5.3 MMOL/L (ref 3.5–5.3)
PROT SERPL-MCNC: 7.4 G/DL (ref 6.4–8.4)
PSA SERPL-MCNC: 0.8 NG/ML (ref 0–4)
RBC # BLD AUTO: 4.05 MILLION/UL (ref 3.88–5.62)
SODIUM SERPL-SCNC: 136 MMOL/L (ref 135–147)
TRIGL SERPL-MCNC: 290 MG/DL
TSH SERPL DL<=0.05 MIU/L-ACNC: 5.15 UIU/ML (ref 0.45–4.5)
VIT B12 SERPL-MCNC: 1451 PG/ML (ref 100–900)
WBC # BLD AUTO: 10.33 THOUSAND/UL (ref 4.31–10.16)

## 2023-03-22 LAB
EST. AVERAGE GLUCOSE BLD GHB EST-MCNC: 229 MG/DL
HBA1C MFR BLD: 9.6 %

## 2023-03-29 ENCOUNTER — TELEPHONE (OUTPATIENT)
Dept: INTERNAL MEDICINE CLINIC | Facility: CLINIC | Age: 53
End: 2023-03-29

## 2023-03-29 DIAGNOSIS — I95.1 ORTHOSTATIC HYPOTENSION: ICD-10-CM

## 2023-03-29 NOTE — TELEPHONE ENCOUNTER
Patient called for refills on his meds I was able to send in for some refill but he said he needed refills for ibu 400mg and also something for diarrhea cause his meds make him poo a lot

## 2023-03-31 RX ORDER — FERROUS SULFATE 325(65) MG
325 TABLET ORAL
Status: CANCELLED | OUTPATIENT
Start: 2023-03-31

## 2023-03-31 RX ORDER — MIDODRINE HYDROCHLORIDE 10 MG/1
10 TABLET ORAL 2 TIMES DAILY
Qty: 90 TABLET | Refills: 3 | Status: CANCELLED | OUTPATIENT
Start: 2023-03-31

## 2023-04-04 ENCOUNTER — OFFICE VISIT (OUTPATIENT)
Dept: CARDIOLOGY CLINIC | Facility: HOSPITAL | Age: 53
End: 2023-04-04

## 2023-04-04 ENCOUNTER — HOSPITAL ENCOUNTER (OUTPATIENT)
Dept: NON INVASIVE DIAGNOSTICS | Facility: HOSPITAL | Age: 53
Discharge: HOME | End: 2023-04-04

## 2023-04-04 VITALS
WEIGHT: 164.2 LBS | BODY MASS INDEX: 22.24 KG/M2 | SYSTOLIC BLOOD PRESSURE: 130 MMHG | HEART RATE: 85 BPM | HEIGHT: 72 IN | OXYGEN SATURATION: 96 % | DIASTOLIC BLOOD PRESSURE: 88 MMHG

## 2023-04-04 VITALS
BODY MASS INDEX: 22.21 KG/M2 | WEIGHT: 164 LBS | DIASTOLIC BLOOD PRESSURE: 62 MMHG | HEIGHT: 72 IN | HEART RATE: 67 BPM | SYSTOLIC BLOOD PRESSURE: 100 MMHG

## 2023-04-04 DIAGNOSIS — E78.2 MIXED HYPERLIPIDEMIA: ICD-10-CM

## 2023-04-04 DIAGNOSIS — E11.42 TYPE 2 DIABETES MELLITUS WITH DIABETIC POLYNEUROPATHY, WITHOUT LONG-TERM CURRENT USE OF INSULIN (HCC): Chronic | ICD-10-CM

## 2023-04-04 DIAGNOSIS — I95.1 ORTHOSTATIC HYPOTENSION: ICD-10-CM

## 2023-04-04 DIAGNOSIS — R42 DIZZINESS: ICD-10-CM

## 2023-04-04 DIAGNOSIS — I95.1 ORTHOSTATIC HYPOTENSION: Primary | ICD-10-CM

## 2023-04-04 LAB
AORTIC ROOT: 4 CM
APICAL FOUR CHAMBER EJECTION FRACTION: 53 %
ASCENDING AORTA: 4.1 CM
AV LVOT MEAN GRADIENT: 1 MMHG
AV LVOT PEAK GRADIENT: 2 MMHG
DOP CALC LVOT PEAK VEL VTI: 18.37 CM
DOP CALC LVOT PEAK VEL: 0.76 M/S
E WAVE DECELERATION TIME: 344 MS
FRACTIONAL SHORTENING: 36 % (ref 28–44)
INTERVENTRICULAR SEPTUM IN DIASTOLE (PARASTERNAL SHORT AXIS VIEW): 1.2 CM
INTERVENTRICULAR SEPTUM: 1.2 CM (ref 0.6–1.1)
LAAS-AP2: 22.8 CM2
LAAS-AP4: 15 CM2
LEFT ATRIUM SIZE: 4.2 CM
LEFT INTERNAL DIMENSION IN SYSTOLE: 3.4 CM (ref 2.1–4)
LEFT VENTRICULAR INTERNAL DIMENSION IN DIASTOLE: 5.3 CM (ref 3.5–6)
LEFT VENTRICULAR POSTERIOR WALL IN END DIASTOLE: 1.1 CM
LEFT VENTRICULAR STROKE VOLUME: 91 ML
LVSV (TEICH): 91 ML
MV E'TISSUE VEL-SEP: 4 CM/S
MV PEAK A VEL: 1.01 M/S
MV PEAK E VEL: 58 CM/S
MV STENOSIS PRESSURE HALF TIME: 100 MS
MV VALVE AREA P 1/2 METHOD: 2.2 CM2
RA PRESSURE ESTIMATED: 5 MMHG
RIGHT ATRIUM AREA SYSTOLE A4C: 9.3 CM2
RIGHT VENTRICLE ID DIMENSION: 2.4 CM
SL CV LEFT ATRIUM LENGTH A2C: 5.8 CM
SL CV LV EF: 45
SL CV PED ECHO LEFT VENTRICLE DIASTOLIC VOLUME (MOD BIPLANE) 2D: 138 ML
SL CV PED ECHO LEFT VENTRICLE SYSTOLIC VOLUME (MOD BIPLANE) 2D: 47 ML
TRICUSPID ANNULAR PLANE SYSTOLIC EXCURSION: 2 CM

## 2023-04-04 NOTE — LETTER
April 4, 2023     [address]  [address]    Patient: Dayna Arriaga   YOB: 1970   Date of Visit: 4/4/2023       Dear Dr Stovall Repress: Thank you for referring Randy Salas to me for evaluation  Below are my notes for this consultation  If you have questions, please do not hesitate to call me  I look forward to following your patient along with you  Sincerely,        Claudio Reed MD        CC: MD Claudio Akins MD  4/4/2023  2:25 PM  Incomplete                                             Cardiology Follow Up    Dayna Arriaga  1970  [id number]  [address]  [address] [facility] [address] [phone]  [phone]    No diagnosis found  There are no diagnoses linked to this encounter  I had the pleasure of seeing Dayna Arriaga for a follow up visit  INTERVAL HISTORY: stable    History of the presenting illness, Discussion/Summary and My Plan are as follows:::    13-year-old with a history of longstanding diabetes since 2007, with chronically poor control, has neuropathy involving both upper extremities from the elbows down words as well as lower extremities from the knees down words, with diabetes control having become more difficult 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  History is also significant for PTSD and seizures-last episode in 2007, stable on Keppra and Lamictal   He also has a history of myositis/rhabdomyolysis (CTA level of 402) for which a quadriceps muscle biopsy was performed in July 2020 (he originally presented to [facility] with a foot ulcer and ultimately discharged on antibiotics per Podiatry) then within a few days, admitted to the [facility] with falls and elevated creatinine kinase,, HMG Co a antibody was negative,, antibodies were not detected,   Although per rheumatology note  stated myositis with differential diagnosis includes inflammatory myopathy, neuropathy secondary to diabetes and alcohol-2020  He tells me that ultimately this was attributed to the statins      At his last visit he was not drinking at all, now finishing a sixpack over a week  No tobacco use  No drug use      No FH of heart disease in mother, does not know his father  One sibling might have a 'heart condition      He also underwent coronary angiography around 2018 that apparently showed nonobstructive CAD, no stents were placed      At baseline he is not very active, gets around with a cane, limited by neuropathy knee arthritis and back pain  He has to chair lifts in the house to go up stairs  No symptoms with this level of activity  He originally presented for orthostatic hypotension while on midodrine 10 mg twice daily, with orthostatics being positive in the office, at his last visit-2022-did discuss that we could go up on the midodrine further to 10 mg 3 times daily, compression stockings were considered but not ordered since he would have difficulty wearing them, overall since that visit he has not had any major symptoms although notes well to be careful with sudden changes in position, no falls since his last visit  His only complaint was orthostatic hypotension, despite drinking close to 48 oz of Gatorade a day and a bottle to 2 of water, drinks no coffee  He has overall adjusted his lifestyle for his orthostatic symptoms  Blood pressures 2022    Supine 124/80  Sitting 100/70  Standing 120/80    Blood pressures 2023  Supine: 160/100  Sittin/100  Standin/60     Plan:     Orthostatic hypotension:   Still positive, he is currently on midodrine 10 mg twice daily,  I told him that he does have the option of going up on midodrine to 10 mg 3 times daily    It appears that his orthostatic symptoms while relatively stable, does not impair his lifestyle hence we will make no further changes at this time     In addition he also has severe neuropathy involving his upper extremities, cannot even feel what he is holding  Compression stockings are an option as well although with no sensation in his fingers, will not be easy wearing them  Will check an echo, it appears that he might have had an echo in 2017 although no report is available      Diabetes:  Chronically poor control, has severe neuropathy involving the upper and lower extremities, ever since he had a partial pancreatectomy, control has been even harder, he does see an endocrinologist   Will need better control long-term to prevent his neuropathy fron progressing, most recent A1c around 9,      Hypertriglyceridemia/mixed dyslipidemia:  See below, it appears that his triglycerides do track his hemoglobin A1c  Will recheck a lipid profile with his next A1c  Most recently triglycerides around 200-300 with non-HDL around 100-120  And this is why he is on no medications-gemfibrozil shows up on his medication list although he is not on it  Hence this was removed  He did have a pancreatic pseudocyst and a partial pancreatectomy but does not recall a prior history of pancreatitis  He did have a remote history of alcohol abuse although at his last visit was not drinking at all, now drinking modestly-a sixpack over a week-can increase triglyceride levels as well  He is absolutely averse to starting a statin but I also offered a nonstatin medication such as Zetia which could lower his non-HDL cholesterol or fenofibrate that would reduce both triglycerides and non-HDL but he is hesitant and would like to think about it     History of elevated CK/Myositis/Rhabdomyolysis:  Complicated history-reviewed from the [place], Not sure what statin he was on, it appears that overall the rhabdomyolysis with a CK level of 400 but with diffuse myalgias, was inconclusive, with biopsy negative for HMG Co a antibodies    He has not been on a statin since  All CK levels since 2021 at [facility] have been normal     Follow-up in about 6 months       Latest Reference Range & Units 04/05/21 07:35 01/04/22 08:05 07/24/22 13:01   Total CK 39 - 308 U/L 82 89 73      Latest Reference Range & Units 07/19/22 07:22 11/01/22 07:18 03/21/23 07:00   Cholesterol See Comment mg/dL 135 142 158   Triglycerides See Comment mg/dL 278 (H) 231 (H) 290 (H)   HDL >=40 mg/dL 32 (L) 34 (L) 39 (L)   Non-HDL Cholesterol mg/dl 103 108 119   LDL Calculated 0 - 100 mg/dL 47 62 61   (H): Data is abnormally high  (L): Data is abnormally low   Latest Reference Range & Units 11/01/22 07:18 02/08/23 14:12 03/21/23 07:00   Hemoglobin A1C  8 4 (H) 10 2 ! 9 6 (H)   (H): Data is abnormally high  !: Data is abnormal      Patient Active Problem List   Diagnosis   • Alcoholic ketoacidosis   • Type 2 diabetes mellitus with diabetic polyneuropathy, without long-term current use of insulin (HCC)   • Chronic alcohol abuse   • SUSIE (acute kidney injury) ([facility] [address] [address] )   • Gastrointestinal hemorrhage with hematemesis   • Diabetic ulcer of toe of right foot associated with type 2 diabetes mellitus (HCC)   • Sepsis, unspecified organism ([facility] [address] )   • Seizure disorder (HCC)   • PTSD (post-traumatic stress disorder)   • Mixed hyperlipidemia   • Orthostatic hypotension   • Alcohol intake above recommended sensible limits   • Anxiety   • Arteriosclerosis of coronary artery     Past Medical History:   Diagnosis Date   • Alcoholic hepatitis    • Anxiety    • Chronic liver disease    • Depression    • Diabetes ([facility] [address] )    • Diabetes mellitus ([facility] [address] )    • Diabetic ulcer of toe of right foot associated with type 2 diabetes mellitus ([facility] [address] ) 06/27/2020   • Diabetic ulcer of toe of right foot associated with type 2 diabetes mellitus, with fat layer exposed ([facility] [address] ) 06/27/2020   • Hypertension    • Iron deficiency anemia    • Lactic acidosis 06/27/2020   • Pancreatitis    • PTSD (post-traumatic stress disorder)    • Seizure [facility])    • Se[facility] disorder ([facility] ) 06/27/2020   • Seizures ([place] [address] )    • Sepsis, unspecified organism ([place] [address] ) 06/27/2020   • Type 2 diabetes mellitus with diabetic polyneuropathy, without long-term current use of insulin ([place] [address] ) 03/25/2019     Social History     Socioeconomic History   • Marital status:      Spouse name: Not on file   • Number of children: Not on file   • Years of education: Not on file   • Highest education level: Not on file   Occupational History   • Not on file   Tobacco Use   • Smoking status: Never   • Smokeless tobacco: Never   Vaping Use   • Vaping Use: Never used   Substance and Sexual Activity   • Alcohol use: Yes     Alcohol/week: 15 0 standard drinks     Types: 15 Cans of beer per week     Comment: 15 cans beer daily   • Drug use: Never   • Sexual activity: Not on file   Other Topics Concern   • Not on file   Social History Narrative   • Not on file     Social Determinants of Health     Financial Resource Strain: Not on file   Food Insecurity: Not on file   Transportation Needs: Not on file   Physical Activity: Not on file   Stress: Not on file   Social Connections: Not on file   Intimate Partner Violence: Not on file   Housing Stability: Not on file      History reviewed  No pertinent family history    Past Surgical History:   Procedure Laterality Date   • ABDOMINAL SURGERY     • CARDIAC CATHETERIZATION     • PANCREATECTOMY     • SPLENECTOMY     • TONSILLECTOMY         Current Outpatient Medications:   •  busPIRone (BUSPAR) 15 mg tablet, Take 30 mg by mouth 2 (two) times a day, Disp: , Rfl:   •  calcium carbonate (OS-LUIGI) 600 MG tablet, Take 650 mg by mouth 2 (two) times a day with meals, Disp: , Rfl:   •  COLESTIPOL HCL PO, Take 1 g by mouth 2 (two) times a day, Disp: , Rfl:   •  cyanocobalamin 1000 MCG tablet, Take 100 mcg by mouth daily, Disp: , Rfl:   •  Empagliflozin 25 MG TABS, Take 25 mg by mouth every morning, Disp: , Rfl:   •  ergocalciferol (ERGOCALCIFEROL) 04253 units capsule, Take 50,000 Units by mouth once a week, Disp: , Rfl:   •  ferrous sulfate 325 (65 Fe) mg tablet, Take 325 mg by mouth daily with breakfast, Disp: , Rfl:   •  folic acid (FOLVITE) 1 mg tablet, Take 1 mg by mouth daily, Disp: , Rfl:   •  gemfibrozil (LOPID) 600 mg tablet, Take 600 mg by mouth daily, Disp: , Rfl:   •  HYDROcodone-acetaminophen (NORCO) 5-325 mg per tablet, Take 1 tablet by mouth every 12 (twelve) hours as needed for pain, Disp: , Rfl:   •  insulin glargine (LANTUS) 100 units/mL subcutaneous injection, Inject 30 Units under the skin 2 (two) times a day, Disp: 20 mL, Rfl: 0  •  insulin lispro (HumaLOG) 100 units/mL injection, Inject 1-6 Units under the skin 3 (three) times a day before meals, Disp: 10 mL, Rfl: 0  •  lamoTRIgine (LaMICtal) 25 mg tablet, Take 25 mg by mouth daily, Disp: , Rfl:   •  levETIRAcetam (KEPPRA) 500 mg tablet, Take 500 mg by mouth every 12 (twelve) hours, Disp: , Rfl:   •  Magnesium Oxide 400 MG CAPS, Take 400 mg by mouth 2 (two) times a day, Disp: , Rfl:   •  Melatonin 5 MG CAPS, Take 5 mg by mouth daily at bedtime, Disp: , Rfl:   •  metFORMIN (GLUCOPHAGE) 1000 MG tablet, Take 1,000 mg by mouth 2 (two) times a day with meals, Disp: , Rfl:   •  midodrine (PROAMATINE) 10 MG tablet, Take 1 tablet (10 mg total) by mouth 2 (two) times a day, Disp: 90 tablet, Rfl: 3  •  mirtazapine (REMERON) 30 mg tablet, Take 45 mg by mouth daily at bedtime , Disp: , Rfl:   •  Multiple Vitamins-Minerals (MULTIVITAMIN WITH MINERALS) tablet, Take 1 tablet by mouth daily, Disp: , Rfl:   •  omeprazole (PriLOSEC) 20 mg delayed release capsule, Take 20 mg by mouth daily, Disp: , Rfl:   •  pantoprazole (PROTONIX) 40 mg tablet, Take 1 tablet (40 mg total) by mouth daily in the early morning (Patient taking differently: Take 20 mg by mouth daily in the early morning), Disp: 30 tablet, Rfl: 0  •  potassium chloride (K-DUR,KLOR-CON) 20 mEq tablet, Take 20 mEq by mouth 2 (two) times a day, Disp: , Rfl:   • "sertraline (ZOLOFT) 100 mg tablet, Take 100 mg by mouth 2 (two) times a day, Disp: , Rfl:   •  thiamine (VITAMIN B1) 100 mg tablet, Take 100 mg by mouth daily, Disp: , Rfl:   No Known Allergies    Imaging: No results found  Review of Systems:  Review of Systems    Physical Exam:  /88   Pulse 85   Ht 6' (1 829 m)   Wt 74 5 kg (164 lb 3 2 oz)   SpO2 96%   BMI 22 27 kg/m²   Physical Exam  Constitutional:       General: He is not in acute distress  Appearance: He is not ill-appearing, toxic-appearing or diaphoretic  HENT:      Nose: Nose normal  No congestion or rhinorrhea  Mouth/Throat:      Mouth: Mucous membranes are moist       Pharynx: Oropharynx is clear  No oropharyngeal exudate or posterior oropharyngeal erythema  Neck:      Vascular: No carotid bruit  Cardiovascular:      Heart sounds: No murmur heard  No friction rub  Pulmonary:      Effort: Pulmonary effort is normal  No respiratory distress  Breath sounds: No wheezing or rhonchi  Musculoskeletal:         General: No swelling, deformity or signs of injury  Normal range of motion  Cervical back: Normal range of motion  No rigidity or tenderness  Lymphadenopathy:      Cervical: No cervical adenopathy  Neurological:      Mental Status: He is alert  This note was completed in part utilizing Cloud[employer] direct voice recognition software  Grammatical errors, random word insertion, spelling mistakes, occasional wrong word or \"sound-alike\" substitutions and incomplete sentences may be an occasional consequence of the system secondary to software limitations, ambient noise and hardware issues  At the time of dictation, efforts were made to edit, clarify and /or correct errors  Please read the chart carefully and recognize, using context, where substitutions have occurred    If you have any questions or concerns about the context, text or information contained within the body of this dictation, please " contact myself, the provider, for further clarification

## 2023-04-04 NOTE — PROGRESS NOTES
Cardiology Follow Up    Reynold Aponte  1970  [id number]  [facility] [place] [address] [address]  [address] [place] [address] [phone]    No diagnosis found  There are no diagnoses linked to this encounter  I had the pleasure of seeing Reynold Aponte for a follow up visit  INTERVAL HISTORY: stable    History of the presenting illness, Discussion/Summary and My Plan are as follows:::    59-year-old with a history of longstanding diabetes since 2007, with chronically poor control, has neuropathy involving both upper extremities from the elbows down words as well as lower extremities from the knees down words, with diabetes control having become more difficult 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  History is also significant for PTSD and seizures-last episode in 2007, stable on Keppra and Lamictal   He also has a history of myositis/rhabdomyolysis (CTA level of 402) for which a quadriceps muscle biopsy was performed in July 2020 (he originally presented to [facility] with a foot ulcer and ultimately discharged on antibiotics per Podiatry) then within a few days, admitted to the [place] with falls and elevated creatinine kinase,, HMG Co a antibody was negative,, antibodies were not detected,  Although per rheumatology note  stated myositis with differential diagnosis includes inflammatory myopathy, neuropathy secondary to diabetes and alcohol-July 2020  He tells me that ultimately this was attributed to the statins      At his last visit he was not drinking at all, now finishing a sixpack over a week  No tobacco use  No drug use      No FH of heart disease in mother, does not know his father   One sibling might have a 'heart condition      He also underwent coronary angiography around 2018 that apparently showed nonobstructive CAD, no stents were placed      At baseline he is not very active, gets around with a cane, limited by neuropathy knee arthritis and back pain  He has to chair lifts in the house to go up stairs  No symptoms with this level of activity  He originally presented for orthostatic hypotension while on midodrine 10 mg twice daily, with orthostatics being positive in the office, at his last visit-2022-did discuss that we could go up on the midodrine further to 10 mg 3 times daily, compression stockings were considered but not ordered since he would have difficulty wearing them, overall since that visit he has not had any major symptoms although notes well to be careful with sudden changes in position, no falls since his last visit  His only complaint was orthostatic hypotension, despite drinking close to 48 oz of Gatorade a day and a bottle to 2 of water, drinks no coffee  He has overall adjusted his lifestyle for his orthostatic symptoms  Blood pressures 2022    Supine 124/80  Sitting 100/70  Standing 120/80    Blood pressures 2023  Supine: 160/100  Sittin/100  Standin/60     Plan:     Orthostatic hypotension:   Still positive, he is currently on midodrine 10 mg twice daily,  I told him that he does have the option of going up on midodrine to 10 mg 3 times daily  It appears that his orthostatic symptoms while relatively stable, does not impair his lifestyle hence we will make no further changes at this time     In addition he also has severe neuropathy involving his upper extremities, cannot even feel what he is holding  Compression stockings are an option as well although with no sensation in his fingers, will not be easy wearing them      Will check an echo, it appears that he might have had an echo in 2017 although no report is available      Diabetes:  Chronically poor control, has severe neuropathy involving the upper and lower extremities, ever since he had a partial pancreatectomy, control has been even harder, he does see an endocrinologist   Will need better control long-term to prevent his neuropathy fron progressing, most recent A1c around 9,      Hypertriglyceridemia/mixed dyslipidemia:  See below, it appears that his triglycerides do track his hemoglobin A1c  Will recheck a lipid profile with his next A1c  Most recently triglycerides around 200-300 with non-HDL around 100-120  And this is why he is on no medications-gemfibrozil shows up on his medication list although he is not on it  Hence this was removed  He did have a pancreatic pseudocyst and a partial pancreatectomy but does not recall a prior history of pancreatitis  He did have a remote history of alcohol abuse although at his last visit was not drinking at all, now drinking modestly-a sixpack over a week-can increase triglyceride levels as well  He is absolutely averse to starting a statin but I also offered a nonstatin medication such as Zetia which could lower his non-HDL cholesterol or fenofibrate that would reduce both triglycerides and non-HDL but he is hesitant and would like to think about it     History of elevated CK/Myositis/Rhabdomyolysis:  Complicated history-reviewed from the [place], Not sure what statin he was on, it appears that overall the rhabdomyolysis with a CK level of 400 but with diffuse myalgias, was inconclusive, with biopsy negative for HMG Co a antibodies  He has not been on a statin since  All CK levels since 2021 at [facility] have been normal     Follow-up in about 6 months       Latest Reference Range & Units 04/05/21 07:35 01/04/22 08:05 07/24/22 13:01   Total CK 39 - 308 U/L 82 89 73      Latest Reference Range & Units 07/19/22 07:22 11/01/22 07:18 03/21/23 07:00   Cholesterol See Comment mg/dL 135 142 158   Triglycerides See Comment mg/dL 278 (H) 231 (H) 290 (H)   HDL >=40 mg/dL 32 (L) 34 (L) 39 (L)   Non-HDL Cholesterol mg/dl 103 108 119   LDL Calculated 0 - 100 mg/dL 47 62 61   (H): Data is abnormally high  (L): Data is abnormally low   Latest Reference Range & Units 11/01/22 07:18 02/08/23 14:12 03/21/23 07:00   Hemoglobin A1C  8 4 (H) 10 2 ! 9 6 (H)   (H): Data is abnormally high  !: Data is abnormal      Patient Active Problem List   Diagnosis   • Alcoholic ketoacidosis   • Type 2 diabetes mellitus with diabetic polyneuropathy, without long-term current use of insulin (HCC)   • Chronic alcohol abuse   • SUSIE (acute kidney injury) ([place] [address] )   • Gastrointestinal hemorrhage with hematemesis   • Diabetic ulcer of toe of right foot associated with type 2 diabetes mellitus (HCC)   • Sepsis, unspecified organism ([place] [address] )   • Seizure disorder ([facility])   • PTSD (post-traumatic stress disorder)   • Mixed hyperlipidemia   • Orthostatic hypotension   • Alcohol intake above recommended sensible limits   • Anxiety   • Arteriosclerosis of coronary artery     Past Medical History:   Diagnosis Date   • Alcoholic hepatitis    • Anxiety    • Chronic liver disease    • Depression    • Diabetes ([place] [address] )    • Diabetes mellitus ([place] [address] )    • Diabetic ulcer of toe of right foot associated with type 2 diabetes mellitus ([facility] ) 06/27/2020   • Diabetic ulcer of toe of right foot associated with type 2 diabetes mellitus, with fat layer exposed ([place] [address] ) 06/27/2020   • Hypertension    • Iron deficiency anemia    • Lactic acidosis 06/27/2020   • Pancreatitis    • PTSD (post-traumatic stress disorder)    • Seizure ([place] [address] )    • Seizure disorder ([facility] ) 06/27/2020   • Seizures ([place] [address] )    • Sepsis, unspecified organism ([place] [address] ) 06/27/2020   • Type 2 diabetes mellitus with diabetic polyneuropathy, without long-term current use of insulin ([place] [address] ) 03/25/2019     Social History     Socioeconomic History   • Marital status:      Spouse name: Not on file   • Number of children: Not on file   • Years of education: Not on file   • Highest education level: Not on file   Occupational History   • Not on file   Tobacco Use   • Smoking status: Never   • Smokeless tobacco: Never   Vaping Use   • Vaping Use: Never used   Substance and Sexual Activity   • Alcohol use: Yes     Alcohol/week: 15 0 standard drinks     Types: 15 Cans of beer per week     Comment: 15 cans beer daily   • Drug use: Never   • Sexual activity: Not on file   Other Topics Concern   • Not on file   Social History Narrative   • Not on file     Social Determinants of Health     Financial Resource Strain: Not on file   Food Insecurity: Not on file   Transportation Needs: Not on file   Physical Activity: Not on file   Stress: Not on file   Social Connections: Not on file   Intimate Partner Violence: Not on file   Housing Stability: Not on file      History reviewed  No pertinent family history    Past Surgical History:   Procedure Laterality Date   • ABDOMINAL SURGERY     • CARDIAC CATHETERIZATION     • PANCREATECTOMY     • SPLENECTOMY     • TONSILLECTOMY         Current Outpatient Medications:   •  busPIRone (BUSPAR) 15 mg tablet, Take 30 mg by mouth 2 (two) times a day, Disp: , Rfl:   •  calcium carbonate (OS-LUIGI) 600 MG tablet, Take 650 mg by mouth 2 (two) times a day with meals, Disp: , Rfl:   •  COLESTIPOL HCL PO, Take 1 g by mouth 2 (two) times a day, Disp: , Rfl:   •  cyanocobalamin 1000 MCG tablet, Take 100 mcg by mouth daily, Disp: , Rfl:   •  Empagliflozin 25 MG TABS, Take 25 mg by mouth every morning, Disp: , Rfl:   •  ergocalciferol (ERGOCALCIFEROL) 39515 units capsule, Take 50,000 Units by mouth once a week, Disp: , Rfl:   •  ferrous sulfate 325 (65 Fe) mg tablet, Take 325 mg by mouth daily with breakfast, Disp: , Rfl:   •  folic acid (FOLVITE) 1 mg tablet, Take 1 mg by mouth daily, Disp: , Rfl:   •  gemfibrozil (LOPID) 600 mg tablet, Take 600 mg by mouth daily, Disp: , Rfl:   •  HYDROcodone-acetaminophen (NORCO) 5-325 mg per tablet, Take 1 tablet by mouth every 12 (twelve) hours as needed for pain, Disp: , Rfl:   •  insulin glargine (LANTUS) 100 units/mL subcutaneous injection, Inject 30 Units under the skin 2 (two) times a day, Disp: 20 mL, Rfl: 0  •  insulin lispro (HumaLOG) 100 units/mL injection, Inject 1-6 Units under the skin 3 (three) times a day before meals, Disp: 10 mL, Rfl: 0  •  lamoTRIgine (LaMICtal) 25 mg tablet, Take 25 mg by mouth daily, Disp: , Rfl:   •  levETIRAcetam (KEPPRA) 500 mg tablet, Take 500 mg by mouth every 12 (twelve) hours, Disp: , Rfl:   •  Magnesium Oxide 400 MG CAPS, Take 400 mg by mouth 2 (two) times a day, Disp: , Rfl:   •  Melatonin 5 MG CAPS, Take 5 mg by mouth daily at bedtime, Disp: , Rfl:   •  metFORMIN (GLUCOPHAGE) 1000 MG tablet, Take 1,000 mg by mouth 2 (two) times a day with meals, Disp: , Rfl:   •  midodrine (PROAMATINE) 10 MG tablet, Take 1 tablet (10 mg total) by mouth 2 (two) times a day, Disp: 90 tablet, Rfl: 3  •  mirtazapine (REMERON) 30 mg tablet, Take 45 mg by mouth daily at bedtime , Disp: , Rfl:   •  Multiple Vitamins-Minerals (MULTIVITAMIN WITH MINERALS) tablet, Take 1 tablet by mouth daily, Disp: , Rfl:   •  omeprazole (PriLOSEC) 20 mg delayed release capsule, Take 20 mg by mouth daily, Disp: , Rfl:   •  pantoprazole (PROTONIX) 40 mg tablet, Take 1 tablet (40 mg total) by mouth daily in the early morning (Patient taking differently: Take 20 mg by mouth daily in the early morning), Disp: 30 tablet, Rfl: 0  •  potassium chloride (K-DUR,KLOR-CON) 20 mEq tablet, Take 20 mEq by mouth 2 (two) times a day, Disp: , Rfl:   •  sertraline (ZOLOFT) 100 mg tablet, Take 100 mg by mouth 2 (two) times a day, Disp: , Rfl:   •  thiamine (VITAMIN B1) 100 mg tablet, Take 100 mg by mouth daily, Disp: , Rfl:   No Known Allergies    Imaging: No results found  Review of Systems:  Review of Systems    Physical Exam:  /88   Pulse 85   Ht 6' (1 829 m)   Wt 74 5 kg (164 lb 3 2 oz)   SpO2 96%   BMI 22 27 kg/m²   Physical Exam  Constitutional:       General: He is not in acute distress  Appearance: He is not ill-appearing, toxic-appearing or diaphoretic  "  HENT:      Nose: Nose normal  No congestion or rhinorrhea  Mouth/Throat:      Mouth: Mucous membranes are moist       Pharynx: Oropharynx is clear  No oropharyngeal exudate or posterior oropharyngeal erythema  Neck:      Vascular: No carotid bruit  Cardiovascular:      Heart sounds: No murmur heard  No friction rub  Pulmonary:      Effort: Pulmonary effort is normal  No respiratory distress  Breath sounds: No wheezing or rhonchi  Musculoskeletal:         General: No swelling, deformity or signs of injury  Normal range of motion  Cervical back: Normal range of motion  No rigidity or tenderness  Lymphadenopathy:      Cervical: No cervical adenopathy  Neurological:      Mental Status: He is alert  This note was completed in part utilizing [id number] direct voice recognition software  Grammatical errors, random word insertion, spelling mistakes, occasional wrong word or \"sound-alike\" substitutions and incomplete sentences may be an occasional consequence of the system secondary to software limitations, ambient noise and hardware issues  At the time of dictation, efforts were made to edit, clarify and /or correct errors  Please read the chart carefully and recognize, using context, where substitutions have occurred  If you have any questions or concerns about the context, text or information contained within the body of this dictation, please contact myself, the provider, for further clarification    "

## 2023-04-05 DIAGNOSIS — I42.8 OTHER CARDIOMYOPATHY (HCC): Primary | ICD-10-CM

## 2023-04-05 NOTE — PROGRESS NOTES
Echo shows a cardiomyopathy-patient is completely asymptomatic    In general patient has severe orthostatic hypotension, poorly controlled diabetes, ambulates with a cane, quite limited in activities    I doubt he would be a good candidate for direct coronary angiography    I will start with a pharmacologic stress test to rule out ischemia  Unfortunately with his orthostatic hypotension, will not tolerate ACE inhibitor/beta-blocker

## 2023-07-14 ENCOUNTER — TELEPHONE (OUTPATIENT)
Dept: INTERNAL MEDICINE CLINIC | Facility: CLINIC | Age: 53
End: 2023-07-14

## 2023-07-14 NOTE — TELEPHONE ENCOUNTER
[facility] called inquiring about patient being ours and needs forms completed for incontinence supplies. I told her that he left and does not want to come back. At that point patient's son was here for his appointment and I asked how his dad is doing and he states that his dad is determined to have all of us fired and will "never" come back here again.

## 2024-02-21 PROBLEM — A41.9 SEPSIS, UNSPECIFIED ORGANISM (HCC): Status: RESOLVED | Noted: 2020-06-27 | Resolved: 2024-02-21

## 2024-11-04 ENCOUNTER — HOSPITAL ENCOUNTER (INPATIENT)
Facility: HOSPITAL | Age: 54
LOS: 2 days | DRG: 291 | End: 2024-11-06
Attending: EMERGENCY MEDICINE | Admitting: HOSPITALIST
Payer: COMMERCIAL

## 2024-11-04 ENCOUNTER — APPOINTMENT (EMERGENCY)
Dept: RADIOLOGY | Facility: HOSPITAL | Age: 54
DRG: 291 | End: 2024-11-04
Payer: COMMERCIAL

## 2024-11-04 DIAGNOSIS — I50.23 ACUTE ON CHRONIC SYSTOLIC HEART FAILURE (HCC): ICD-10-CM

## 2024-11-04 DIAGNOSIS — R60.1 ANASARCA: ICD-10-CM

## 2024-11-04 DIAGNOSIS — R79.89 ELEVATED BRAIN NATRIURETIC PEPTIDE (BNP) LEVEL: ICD-10-CM

## 2024-11-04 DIAGNOSIS — I50.9 ACUTE EXACERBATION OF CHF (CONGESTIVE HEART FAILURE) (HCC): Primary | ICD-10-CM

## 2024-11-04 PROBLEM — Z87.898 HISTORY OF SEIZURE: Status: ACTIVE | Noted: 2017-02-25

## 2024-11-04 PROBLEM — G47.33 OBSTRUCTIVE SLEEP APNEA SYNDROME: Status: ACTIVE | Noted: 2024-11-04

## 2024-11-04 PROBLEM — K21.9 GERD (GASTROESOPHAGEAL REFLUX DISEASE): Status: ACTIVE | Noted: 2023-07-03

## 2024-11-04 PROBLEM — Z90.81 H/O SPLENECTOMY: Status: ACTIVE | Noted: 2024-11-04

## 2024-11-04 PROBLEM — D50.9 IRON DEFICIENCY ANEMIA: Status: ACTIVE | Noted: 2017-02-25

## 2024-11-04 PROBLEM — N18.31 STAGE 3A CHRONIC KIDNEY DISEASE (HCC): Status: ACTIVE | Noted: 2024-11-04

## 2024-11-04 PROBLEM — F32.A DEPRESSION: Status: ACTIVE | Noted: 2017-02-25

## 2024-11-04 LAB
2HR DELTA HS TROPONIN: -35 NG/L
4HR DELTA HS TROPONIN: -48 NG/L
ALBUMIN SERPL BCG-MCNC: 3.4 G/DL (ref 3.5–5)
ALP SERPL-CCNC: 350 U/L (ref 34–104)
ALT SERPL W P-5'-P-CCNC: 24 U/L (ref 7–52)
ANION GAP SERPL CALCULATED.3IONS-SCNC: 7 MMOL/L (ref 4–13)
AST SERPL W P-5'-P-CCNC: 38 U/L (ref 13–39)
BACTERIA UR QL AUTO: ABNORMAL /HPF
BASOPHILS # BLD AUTO: 0.1 THOUSANDS/ÂΜL (ref 0–0.1)
BASOPHILS NFR BLD AUTO: 1 % (ref 0–1)
BILIRUB SERPL-MCNC: 0.58 MG/DL (ref 0.2–1)
BILIRUB UR QL STRIP: NEGATIVE
BNP SERPL-MCNC: 3272 PG/ML (ref 0–100)
BUN SERPL-MCNC: 23 MG/DL (ref 5–25)
CALCIUM ALBUM COR SERPL-MCNC: 9.3 MG/DL (ref 8.3–10.1)
CALCIUM SERPL-MCNC: 8.8 MG/DL (ref 8.4–10.2)
CARDIAC TROPONIN I PNL SERPL HS: 112 NG/L
CARDIAC TROPONIN I PNL SERPL HS: 64 NG/L
CARDIAC TROPONIN I PNL SERPL HS: 77 NG/L
CHLORIDE SERPL-SCNC: 110 MMOL/L (ref 96–108)
CLARITY UR: CLEAR
CO2 SERPL-SCNC: 24 MMOL/L (ref 21–32)
COLOR UR: ABNORMAL
CREAT SERPL-MCNC: 1.41 MG/DL (ref 0.6–1.3)
EOSINOPHIL # BLD AUTO: 0.19 THOUSAND/ÂΜL (ref 0–0.61)
EOSINOPHIL NFR BLD AUTO: 2 % (ref 0–6)
ERYTHROCYTE [DISTWIDTH] IN BLOOD BY AUTOMATED COUNT: 17.4 % (ref 11.6–15.1)
GFR SERPL CREATININE-BSD FRML MDRD: 55 ML/MIN/1.73SQ M
GLUCOSE SERPL-MCNC: 249 MG/DL (ref 65–140)
GLUCOSE SERPL-MCNC: 297 MG/DL (ref 65–140)
GLUCOSE UR STRIP-MCNC: NEGATIVE MG/DL
HCT VFR BLD AUTO: 44.8 % (ref 36.5–49.3)
HGB BLD-MCNC: 14.3 G/DL (ref 12–17)
HGB UR QL STRIP.AUTO: ABNORMAL
HYALINE CASTS #/AREA URNS LPF: ABNORMAL /LPF
IMM GRANULOCYTES # BLD AUTO: 0.02 THOUSAND/UL (ref 0–0.2)
IMM GRANULOCYTES NFR BLD AUTO: 0 % (ref 0–2)
KETONES UR STRIP-MCNC: NEGATIVE MG/DL
LEUKOCYTE ESTERASE UR QL STRIP: NEGATIVE
LYMPHOCYTES # BLD AUTO: 2.26 THOUSANDS/ÂΜL (ref 0.6–4.47)
LYMPHOCYTES NFR BLD AUTO: 23 % (ref 14–44)
MCH RBC QN AUTO: 30.6 PG (ref 26.8–34.3)
MCHC RBC AUTO-ENTMCNC: 31.9 G/DL (ref 31.4–37.4)
MCV RBC AUTO: 96 FL (ref 82–98)
MONOCYTES # BLD AUTO: 0.7 THOUSAND/ÂΜL (ref 0.17–1.22)
MONOCYTES NFR BLD AUTO: 7 % (ref 4–12)
NEUTROPHILS # BLD AUTO: 6.59 THOUSANDS/ÂΜL (ref 1.85–7.62)
NEUTS SEG NFR BLD AUTO: 67 % (ref 43–75)
NITRITE UR QL STRIP: NEGATIVE
NON-SQ EPI CELLS URNS QL MICRO: ABNORMAL /HPF
NRBC BLD AUTO-RTO: 0 /100 WBCS
PH UR STRIP.AUTO: 5 [PH]
PLATELET # BLD AUTO: 240 THOUSANDS/UL (ref 149–390)
PMV BLD AUTO: 11.5 FL (ref 8.9–12.7)
POTASSIUM SERPL-SCNC: 4.5 MMOL/L (ref 3.5–5.3)
PROT SERPL-MCNC: 6.5 G/DL (ref 6.4–8.4)
PROT UR STRIP-MCNC: ABNORMAL MG/DL
RBC # BLD AUTO: 4.67 MILLION/UL (ref 3.88–5.62)
RBC #/AREA URNS AUTO: ABNORMAL /HPF
SODIUM SERPL-SCNC: 141 MMOL/L (ref 135–147)
SP GR UR STRIP.AUTO: 1.02 (ref 1–1.03)
UROBILINOGEN UR STRIP-ACNC: <2 MG/DL
WBC # BLD AUTO: 9.86 THOUSAND/UL (ref 4.31–10.16)
WBC #/AREA URNS AUTO: ABNORMAL /HPF

## 2024-11-04 PROCEDURE — 83880 ASSAY OF NATRIURETIC PEPTIDE: CPT

## 2024-11-04 PROCEDURE — 36415 COLL VENOUS BLD VENIPUNCTURE: CPT

## 2024-11-04 PROCEDURE — 93005 ELECTROCARDIOGRAM TRACING: CPT

## 2024-11-04 PROCEDURE — 71045 X-RAY EXAM CHEST 1 VIEW: CPT

## 2024-11-04 PROCEDURE — 99285 EMERGENCY DEPT VISIT HI MDM: CPT | Performed by: EMERGENCY MEDICINE

## 2024-11-04 PROCEDURE — 99222 1ST HOSP IP/OBS MODERATE 55: CPT | Performed by: FAMILY MEDICINE

## 2024-11-04 PROCEDURE — 99285 EMERGENCY DEPT VISIT HI MDM: CPT

## 2024-11-04 PROCEDURE — 82948 REAGENT STRIP/BLOOD GLUCOSE: CPT

## 2024-11-04 PROCEDURE — 80053 COMPREHEN METABOLIC PANEL: CPT

## 2024-11-04 PROCEDURE — 81001 URINALYSIS AUTO W/SCOPE: CPT

## 2024-11-04 PROCEDURE — 84484 ASSAY OF TROPONIN QUANT: CPT | Performed by: FAMILY MEDICINE

## 2024-11-04 PROCEDURE — 96374 THER/PROPH/DIAG INJ IV PUSH: CPT

## 2024-11-04 PROCEDURE — 84484 ASSAY OF TROPONIN QUANT: CPT

## 2024-11-04 PROCEDURE — 85025 COMPLETE CBC W/AUTO DIFF WBC: CPT

## 2024-11-04 RX ORDER — LEVETIRACETAM 500 MG/1
500 TABLET ORAL EVERY 12 HOURS SCHEDULED
Status: DISCONTINUED | OUTPATIENT
Start: 2024-11-04 | End: 2024-11-06 | Stop reason: HOSPADM

## 2024-11-04 RX ORDER — LAMOTRIGINE 100 MG/1
100 TABLET ORAL DAILY
Status: DISCONTINUED | OUTPATIENT
Start: 2024-11-05 | End: 2024-11-06 | Stop reason: HOSPADM

## 2024-11-04 RX ORDER — MIRTAZAPINE 15 MG/1
30 TABLET, FILM COATED ORAL
Status: DISCONTINUED | OUTPATIENT
Start: 2024-11-04 | End: 2024-11-06 | Stop reason: HOSPADM

## 2024-11-04 RX ORDER — SPIRONOLACTONE 25 MG/1
50 TABLET ORAL DAILY
Status: DISCONTINUED | OUTPATIENT
Start: 2024-11-05 | End: 2024-11-06 | Stop reason: HOSPADM

## 2024-11-04 RX ORDER — HYDROCODONE BITARTRATE AND ACETAMINOPHEN 5; 325 MG/1; MG/1
1 TABLET ORAL EVERY 12 HOURS PRN
Status: DISCONTINUED | OUTPATIENT
Start: 2024-11-04 | End: 2024-11-06 | Stop reason: HOSPADM

## 2024-11-04 RX ORDER — FUROSEMIDE 10 MG/ML
40 INJECTION INTRAMUSCULAR; INTRAVENOUS
Status: DISCONTINUED | OUTPATIENT
Start: 2024-11-05 | End: 2024-11-06

## 2024-11-04 RX ORDER — ACETAMINOPHEN 325 MG/1
650 TABLET ORAL EVERY 6 HOURS PRN
Status: DISCONTINUED | OUTPATIENT
Start: 2024-11-04 | End: 2024-11-06 | Stop reason: HOSPADM

## 2024-11-04 RX ORDER — PANTOPRAZOLE SODIUM 20 MG/1
20 TABLET, DELAYED RELEASE ORAL
Status: DISCONTINUED | OUTPATIENT
Start: 2024-11-05 | End: 2024-11-06 | Stop reason: HOSPADM

## 2024-11-04 RX ORDER — MIDODRINE HYDROCHLORIDE 5 MG/1
10 TABLET ORAL 2 TIMES DAILY
Status: DISCONTINUED | OUTPATIENT
Start: 2024-11-04 | End: 2024-11-06 | Stop reason: HOSPADM

## 2024-11-04 RX ORDER — SPIRONOLACTONE 50 MG/1
50 TABLET, FILM COATED ORAL DAILY
Status: ON HOLD | COMMUNITY
Start: 2024-10-30 | End: 2024-11-09

## 2024-11-04 RX ORDER — INSULIN GLARGINE 100 [IU]/ML
5 INJECTION, SOLUTION SUBCUTANEOUS EVERY MORNING
Status: DISCONTINUED | OUTPATIENT
Start: 2024-11-05 | End: 2024-11-06 | Stop reason: HOSPADM

## 2024-11-04 RX ORDER — HEPARIN SODIUM 5000 [USP'U]/ML
5000 INJECTION, SOLUTION INTRAVENOUS; SUBCUTANEOUS EVERY 8 HOURS SCHEDULED
Status: DISCONTINUED | OUTPATIENT
Start: 2024-11-04 | End: 2024-11-06 | Stop reason: HOSPADM

## 2024-11-04 RX ORDER — BUSPIRONE HYDROCHLORIDE 10 MG/1
30 TABLET ORAL 2 TIMES DAILY
Status: DISCONTINUED | OUTPATIENT
Start: 2024-11-04 | End: 2024-11-06 | Stop reason: HOSPADM

## 2024-11-04 RX ORDER — INSULIN LISPRO 100 [IU]/ML
1-6 INJECTION, SOLUTION INTRAVENOUS; SUBCUTANEOUS
Status: DISCONTINUED | OUTPATIENT
Start: 2024-11-04 | End: 2024-11-06 | Stop reason: HOSPADM

## 2024-11-04 RX ORDER — FERROUS SULFATE 325(65) MG
325 TABLET ORAL
Status: DISCONTINUED | OUTPATIENT
Start: 2024-11-05 | End: 2024-11-06 | Stop reason: HOSPADM

## 2024-11-04 RX ORDER — FUROSEMIDE 10 MG/ML
40 INJECTION INTRAMUSCULAR; INTRAVENOUS ONCE
Status: COMPLETED | OUTPATIENT
Start: 2024-11-04 | End: 2024-11-04

## 2024-11-04 RX ORDER — LANOLIN ALCOHOL/MO/W.PET/CERES
100 CREAM (GRAM) TOPICAL DAILY
Status: DISCONTINUED | OUTPATIENT
Start: 2024-11-05 | End: 2024-11-06 | Stop reason: HOSPADM

## 2024-11-04 RX ADMIN — BUSPIRONE HYDROCHLORIDE 30 MG: 10 TABLET ORAL at 20:45

## 2024-11-04 RX ADMIN — Medication 6 MG: at 22:09

## 2024-11-04 RX ADMIN — MIRTAZAPINE 30 MG: 15 TABLET, FILM COATED ORAL at 22:09

## 2024-11-04 RX ADMIN — HEPARIN SODIUM 5000 UNITS: 5000 INJECTION, SOLUTION INTRAVENOUS; SUBCUTANEOUS at 22:12

## 2024-11-04 RX ADMIN — LEVETIRACETAM 500 MG: 500 TABLET, FILM COATED ORAL at 20:45

## 2024-11-04 RX ADMIN — MIDODRINE HYDROCHLORIDE 10 MG: 5 TABLET ORAL at 20:45

## 2024-11-04 RX ADMIN — INSULIN LISPRO 4 UNITS: 100 INJECTION, SOLUTION INTRAVENOUS; SUBCUTANEOUS at 22:09

## 2024-11-04 RX ADMIN — FUROSEMIDE 40 MG: 10 INJECTION, SOLUTION INTRAMUSCULAR; INTRAVENOUS at 18:15

## 2024-11-04 NOTE — ED PROVIDER NOTES
Time reflects when diagnosis was documented in both MDM as applicable and the Disposition within this note       Time User Action Codes Description Comment    11/4/2024  6:17 PM Danis Ohjodee SURESH Add [I50.9] Acute exacerbation of CHF (congestive heart failure) (HCC)     11/4/2024  6:18 PM Laura Oh Add [R79.89] Elevated brain natriuretic peptide (BNP) level     11/4/2024  6:18 PM Danis Ohjodee SURESH Add [R60.1] Anasarca     11/4/2024  7:33 PM Diogo Carringtonesh Add [I50.23] Acute on chronic systolic heart failure (HCC)           ED Disposition       ED Disposition   Admit    Condition   Stable    Date/Time   Mon Nov 4, 2024  6:17 PM    Comment   Case was discussed with Dr. Cagle and the patient's admission status was agreed to be Admission Status: inpatient status to the service of Dr. Cagle .               Assessment & Plan       Medical Decision Making  54-year-old male presents with abnormal labs.  [facility] and noted to have elevated BNP.  Has been having increased swelling and anasarca.  Labs notable for elevated BNP.  Currently on Lasix, given 40 mg here in the ED after CMP resulted with no electrolyte abnormalities.  No ischemic changes on EKG.  Discussed case with admitting team and patient admitted for CHF exacerbation with anasarca.    Amount and/or Complexity of Data Reviewed  Labs: ordered. Decision-making details documented in ED Course.  Radiology: ordered.    Risk  Prescription drug management.  Decision regarding hospitalization.        ED Course as of 11/04/24 2231   Mon Nov 04, 2024   1712 EKG left bundle branch block, normal sinus rhythm rate of 76, first-degree AV block, wide QRS interval, Sgarbossa negative, poor R wave progression.   1753 BNP(!): 3,272   1753 hs TnI 0hr(!): 112       Medications   furosemide (LASIX) injection 40 mg (40 mg Intravenous Given 11/4/24 1815)       ED Risk Strat Scores   HEART Risk Score      Flowsheet Row Most Recent Value   Heart Score Risk Calculator    History 0  "Filed at: 11/04/2024 2231   ECG 1 Filed at: 11/04/2024 2231   Age 1 Filed at: 11/04/2024 2231   Risk Factors 2 Filed at: 11/04/2024 2231   Troponin 2 Filed at: 11/04/2024 2231   HEART Score 6 Filed at: 11/04/2024 2231                               SBIRT 20yo+      Flowsheet Row Most Recent Value   Initial Alcohol Screen: US AUDIT-C     3a. Male UNDER 65: How often do you have five or more drinks on one occasion? 1 Filed at: 11/04/2024 1637   Audit-C Score 1 Filed at: 11/04/2024 1637                            History of Present Illness       Chief Complaint   Patient presents with    Abnormal Lab     Pt [facility] for [facility] today and \"CHF levels are high\". Also had a 11lb weight gain in 2 weeks chest discomfort       Past Medical History:   Diagnosis Date    Alcoholic hepatitis     Anxiety     Chronic liver disease     Depression     Diabetes (HCC)     Diabetes mellitus (HCC)     Diabetic ulcer of toe of right foot associated with type 2 diabetes mellitus (HCC) 06/27/2020    Diabetic ulcer of toe of right foot associated with type 2 diabetes mellitus, with fat layer exposed (HCC) 06/27/2020    Hypertension     Iron deficiency anemia     Lactic acidosis 06/27/2020    Pancreatitis     PTSD (post-traumatic stress disorder)     Seizure (HCC)     Seizure disorder (HCC) 06/27/2020    Seizures (HCC)     Sepsis, unspecified organism (HCC) 06/27/2020    Type 2 diabetes mellitus with diabetic polyneuropathy, without long-term current use of insulin (HCC) 03/25/2019      Past Surgical History:   Procedure Laterality Date    ABDOMINAL SURGERY      CARDIAC CATHETERIZATION      PANCREATECTOMY      SPLENECTOMY      TONSILLECTOMY        History reviewed. No pertinent family history.   Social History     Tobacco Use    Smoking status: Never    Smokeless tobacco: Never   Vaping Use    Vaping status: Never Used   Substance Use Topics    Alcohol use: Yes     Alcohol/week: 15.0 standard drinks of alcohol     Types: 15 Cans of beer " per week     Comment: 15 cans beer daily    Drug use: Never      E-Cigarette/Vaping    E-Cigarette Use Never User       E-Cigarette/Vaping Substances      I have reviewed and agree with the history as documented.     54-year-old male with history of alcoholic hepatitis, seizures, DM, HTN, CHF presents with abnormal lab.  Patient states that he [facility] draws [facility] with elevated BNP.  States multiple days of chronic chest pain.  Associated shortness of breath on exertion relieved by rest, swelling, unintentional weight gain.  Takes furosemide and spironolactone.  Denies fevers, chills, abdominal pains, dysuria, frequency, urgency, jaundice, orthopnea, PND, changes in urinary or bowel habits.          Review of Systems   All other systems reviewed and are negative.          Objective       ED Triage Vitals [11/04/24 1634]   Temperature Pulse Blood Pressure Respirations SpO2 Patient Position - Orthostatic VS   (!) 97 °F (36.1 °C) 75 119/91 18 93 % Sitting      Temp Source Heart Rate Source BP Location FiO2 (%) Pain Score    Temporal Monitor Right arm -- 2      Vitals      Date and Time Temp Pulse SpO2 Resp BP Pain Score FACES Pain Rating User   11/04/24 2100 -- -- -- -- -- 4 -- TH   11/04/24 1915 -- -- -- -- -- 4 -- BM   11/04/24 1843 98.6 °F (37 °C) 73 90 % 16 119/84 -- -- DII   11/04/24 1800 -- 72 94 % 20 116/89 -- -- SK   11/04/24 1700 -- 74 95 % 18 120/89 -- -- SK   11/04/24 1634 97 °F (36.1 °C) 75 93 % 18 119/91 2 -- KTR            Physical Exam  Vitals and nursing note reviewed.   Constitutional:       General: He is not in acute distress.     Appearance: Normal appearance. He is normal weight. He is ill-appearing. He is not toxic-appearing or diaphoretic.   HENT:      Head: Normocephalic and atraumatic.      Right Ear: External ear normal.      Left Ear: External ear normal.      Nose: Nose normal.      Mouth/Throat:      Mouth: Mucous membranes are moist.      Pharynx: Oropharynx is clear.   Eyes:      General:  No scleral icterus.        Right eye: No discharge.         Left eye: No discharge.      Extraocular Movements: Extraocular movements intact.      Pupils: Pupils are equal, round, and reactive to light.   Neck:      Comments: No midline C/T/L/S spine tenderness, step-offs, deformities.  Full range of motion of the cervical spine without tenderness.    Cardiovascular:      Rate and Rhythm: Normal rate and regular rhythm.      Pulses: Normal pulses.      Heart sounds: Normal heart sounds. No murmur heard.  Pulmonary:      Effort: Pulmonary effort is normal. No respiratory distress.      Breath sounds: No stridor. Rales present. No wheezing or rhonchi.      Comments: Diminished lung sounds over right lower lobe.  Bilateral basilar Rales.  Chest:      Chest wall: No tenderness.   Abdominal:      General: There is no distension.      Palpations: Abdomen is soft. There is no mass.      Tenderness: There is no abdominal tenderness. There is no right CVA tenderness, left CVA tenderness, guarding or rebound.      Hernia: No hernia is present.   Musculoskeletal:         General: No swelling, tenderness, deformity or signs of injury. Normal range of motion.      Cervical back: Normal range of motion and neck supple. No rigidity or tenderness.      Right lower leg: Edema present.      Left lower leg: Edema present.   Skin:     General: Skin is warm and dry.      Capillary Refill: Capillary refill takes less than 2 seconds.      Coloration: Skin is not cyanotic, jaundiced or pale.      Findings: No bruising, erythema, lesion, petechiae or rash.   Neurological:      General: No focal deficit present.      Mental Status: He is alert and oriented to person, place, and time. Mental status is at baseline.   Psychiatric:         Mood and Affect: Mood normal.         Behavior: Behavior normal.         Results Reviewed       Procedure Component Value Units Date/Time    HS Troponin I 4hr [[id number]]  (Abnormal) Collected: 11/04/24 6985     Lab Status: Final result Specimen: Blood from Arm, Left Updated: 11/04/24 2155     hs TnI 4hr 64 ng/L      Delta 4hr hsTnI -48 ng/L     HS Troponin I 2hr [[id number]]  (Abnormal) Collected: 11/04/24 1920    Lab Status: Final result Specimen: Blood from Hand, Left Updated: 11/04/24 2001     hs TnI 2hr 77 ng/L      Delta 2hr hsTnI -35 ng/L     Urine Microscopic [[id number]]  (Abnormal) Collected: 11/04/24 1745    Lab Status: Final result Specimen: Urine, Clean Catch Updated: 11/04/24 1804     RBC, UA 1-2 /hpf      WBC, UA 1-2 /hpf      Epithelial Cells None Seen /hpf      Bacteria, UA Occasional /hpf      Hyaline Casts, UA 2-4 /lpf     UA w Reflex to Microscopic w Reflex to Culture [[id number]]  (Abnormal) Collected: 11/04/24 1745    Lab Status: Final result Specimen: Urine, Clean Catch Updated: 11/04/24 1755     Color, UA Light Yellow     Clarity, UA Clear     Specific Gravity, UA 1.020     pH, UA 5.0     Leukocytes, UA Negative     Nitrite, UA Negative     Protein, UA 30 (1+) mg/dl      Glucose, UA Negative mg/dl      Ketones, UA Negative mg/dl      Urobilinogen, UA <2.0 mg/dl      Bilirubin, UA Negative     Occult Blood, UA Trace    HS Troponin 0hr (reflex protocol) [[id number]]  (Abnormal) Collected: 11/04/24 1704    Lab Status: Final result Specimen: Blood from Arm, Right Updated: 11/04/24 1744     hs TnI 0hr 112 ng/L     B-Type Natriuretic Peptide(BNP) [[id number]]  (Abnormal) Collected: 11/04/24 1704    Lab Status: Final result Specimen: Blood from Arm, Right Updated: 11/04/24 1742     BNP 3,272 pg/mL     Comprehensive metabolic panel [[id number]]  (Abnormal) Collected: 11/04/24 1704    Lab Status: Final result Specimen: Blood from Arm, Right Updated: 11/04/24 1738     Sodium 141 mmol/L      Potassium 4.5 mmol/L      Chloride 110 mmol/L      CO2 24 mmol/L      ANION GAP 7 mmol/L      BUN 23 mg/dL      Creatinine 1.41 mg/dL      Glucose 249 mg/dL      Calcium 8.8 mg/dL      Corrected Calcium 9.3 mg/dL      AST 38  U/L      ALT 24 U/L      Alkaline Phosphatase 350 U/L      Total Protein 6.5 g/dL      Albumin 3.4 g/dL      Total Bilirubin 0.58 mg/dL      eGFR 55 ml/min/1.73sq m     Narrative:      [employer] guidelines for Chronic Kidney Disease (CKD):     Stage 1 with normal or high GFR (GFR > 90 mL/min/1.73 square meters)    Stage 2 Mild CKD (GFR = 60-89 mL/min/1.73 square meters)    Stage 3A Moderate CKD (GFR = 45-59 mL/min/1.73 square meters)    Stage 3B Moderate CKD (GFR = 30-44 mL/min/1.73 square meters)    Stage 4 Severe CKD (GFR = 15-29 mL/min/1.73 square meters)    Stage 5 End Stage CKD (GFR <15 mL/min/1.73 square meters)  Note: GFR calculation is accurate only with a steady state creatinine    CBC and differential [[id number]]  (Abnormal) Collected: 11/04/24 1704    Lab Status: Final result Specimen: Blood from Arm, Right Updated: 11/04/24 1713     WBC 9.86 Thousand/uL      RBC 4.67 Million/uL      Hemoglobin 14.3 g/dL      Hematocrit 44.8 %      MCV 96 fL      MCH 30.6 pg      MCHC 31.9 g/dL      RDW 17.4 %      MPV 11.5 fL      Platelets 240 Thousands/uL      nRBC 0 /100 WBCs      Segmented % 67 %      Immature Grans % 0 %      Lymphocytes % 23 %      Monocytes % 7 %      Eosinophils Relative 2 %      Basophils Relative 1 %      Absolute Neutrophils 6.59 Thousands/µL      Absolute Immature Grans 0.02 Thousand/uL      Absolute Lymphocytes 2.26 Thousands/µL      Absolute Monocytes 0.70 Thousand/µL      Eosinophils Absolute 0.19 Thousand/µL      Basophils Absolute 0.10 Thousands/µL             XR chest 1 view portable   Final Interpretation by Devin Zheng MD (11/04 2148)      Findings suggest CHF with bilateral pleural effusions and basilar edema although some of the pulmonary density could be atelectasis or potentially even pneumonia in the appropriate clinical setting. Correlate clinically      The study was marked in EPIC for immediate notification.            Workstation  performed: [id number]             Procedures    ED Medication and Procedure Management   Prior to Admission Medications   Prescriptions Last Dose Informant Patient Reported? Taking?   COLESTIPOL HCL PO   Yes No   Sig: Take 1 g by mouth 2 (two) times a day   Empagliflozin 25 MG TABS   Yes No   Sig: Take 25 mg by mouth every morning   HYDROcodone-acetaminophen (NORCO) 5-325 mg per tablet   Yes No   Sig: Take 1 tablet by mouth every 12 (twelve) hours as needed for pain   Magnesium Oxide 400 MG CAPS   Yes No   Sig: Take 400 mg by mouth 2 (two) times a day   Melatonin 5 MG CAPS   Yes No   Sig: Take 5 mg by mouth daily at bedtime   Multiple Vitamins-Minerals (MULTIVITAMIN WITH MINERALS) tablet   Yes No   Sig: Take 1 tablet by mouth daily   busPIRone (BUSPAR) 15 mg tablet   Yes No   Sig: Take 30 mg by mouth 2 (two) times a day   calcium carbonate (OS-LUIGI) 600 MG tablet   Yes No   Sig: Take 650 mg by mouth 2 (two) times a day with meals   cyanocobalamin 1000 MCG tablet   Yes No   Sig: Take 100 mcg by mouth daily   ergocalciferol (ERGOCALCIFEROL) 36949 units capsule   Yes No   Sig: Take 50,000 Units by mouth once a week   ferrous sulfate 325 (65 Fe) mg tablet   Yes No   Sig: Take 325 mg by mouth daily with breakfast   folic acid (FOLVITE) 1 mg tablet   Yes No   Sig: Take 1 mg by mouth daily   gemfibrozil (LOPID) 600 mg tablet   Yes No   Sig: Take 600 mg by mouth daily   insulin glargine (LANTUS) 100 units/mL subcutaneous injection   No No   Sig: Inject 30 Units under the skin 2 (two) times a day   insulin lispro (HumaLOG) 100 units/mL injection   No No   Sig: Inject 1-6 Units under the skin 3 (three) times a day before meals   lamoTRIgine (LaMICtal) 25 mg tablet   Yes No   Sig: Take 25 mg by mouth daily   levETIRAcetam (KEPPRA) 500 mg tablet   Yes No   Sig: Take 500 mg by mouth every 12 (twelve) hours   metFORMIN (GLUCOPHAGE) 1000 MG tablet   Yes No   Sig: Take 1,000 mg by mouth 2 (two) times a day with meals   midodrine  (PROAMATINE) 10 MG tablet   No No   Sig: Take 1 tablet (10 mg total) by mouth 2 (two) times a day   mirtazapine (REMERON) 30 mg tablet   Yes No   Sig: Take 45 mg by mouth daily at bedtime    omeprazole (PriLOSEC) 20 mg delayed release capsule   Yes No   Sig: Take 20 mg by mouth daily   pantoprazole (PROTONIX) 40 mg tablet   No No   Sig: Take 1 tablet (40 mg total) by mouth daily in the early morning   Patient taking differently: Take 20 mg by mouth daily in the early morning   potassium chloride (K-DUR,KLOR-CON) 20 mEq tablet   Yes No   Sig: Take 20 mEq by mouth 2 (two) times a day   sertraline (ZOLOFT) 100 mg tablet   Yes No   Sig: Take 100 mg by mouth 2 (two) times a day   spironolactone (ALDACTONE) 50 mg tablet   Yes Yes   Sig: Take 50 mg by mouth daily   thiamine (VITAMIN B1) 100 mg tablet   Yes No   Sig: Take 100 mg by mouth daily      Facility-Administered Medications: None     Current Discharge Medication List        CONTINUE these medications which have NOT CHANGED    Details   spironolactone (ALDACTONE) 50 mg tablet Take 50 mg by mouth daily      busPIRone (BUSPAR) 15 mg tablet Take 30 mg by mouth 2 (two) times a day      calcium carbonate (OS-LUIGI) 600 MG tablet Take 650 mg by mouth 2 (two) times a day with meals      COLESTIPOL HCL PO Take 1 g by mouth 2 (two) times a day      cyanocobalamin 1000 MCG tablet Take 100 mcg by mouth daily      Empagliflozin 25 MG TABS Take 25 mg by mouth every morning      ergocalciferol (ERGOCALCIFEROL) 91209 units capsule Take 50,000 Units by mouth once a week      ferrous sulfate 325 (65 Fe) mg tablet Take 325 mg by mouth daily with breakfast      folic acid (FOLVITE) 1 mg tablet Take 1 mg by mouth daily      gemfibrozil (LOPID) 600 mg tablet Take 600 mg by mouth daily      HYDROcodone-acetaminophen (NORCO) 5-325 mg per tablet Take 1 tablet by mouth every 12 (twelve) hours as needed for pain      insulin glargine (LANTUS) 100 units/mL subcutaneous injection Inject 30 Units  under the skin 2 (two) times a day  Qty: 20 mL, Refills: 0    Associated Diagnoses: Type 2 diabetes mellitus with hyperglycemia, without long-term current use of insulin ([facility])      insulin lispro (HumaLOG) 100 units/mL injection Inject 1-6 Units under the skin 3 (three) times a day before meals  Qty: 10 mL, Refills: 0    Associated Diagnoses: Type 2 diabetes mellitus with hyperglycemia, without long-term current use of insulin ([facility])      lamoTRIgine (LaMICtal) 25 mg tablet Take 25 mg by mouth daily      levETIRAcetam (KEPPRA) 500 mg tablet Take 500 mg by mouth every 12 (twelve) hours      Magnesium Oxide 400 MG CAPS Take 400 mg by mouth 2 (two) times a day      Melatonin 5 MG CAPS Take 5 mg by mouth daily at bedtime      metFORMIN (GLUCOPHAGE) 1000 MG tablet Take 1,000 mg by mouth 2 (two) times a day with meals      midodrine (PROAMATINE) 10 MG tablet Take 1 tablet (10 mg total) by mouth 2 (two) times a day  Qty: 90 tablet, Refills: 3    Associated Diagnoses: Orthostatic hypotension      mirtazapine (REMERON) 30 mg tablet Take 45 mg by mouth daily at bedtime       Multiple Vitamins-Minerals (MULTIVITAMIN WITH MINERALS) tablet Take 1 tablet by mouth daily      omeprazole (PriLOSEC) 20 mg delayed release capsule Take 20 mg by mouth daily      pantoprazole (PROTONIX) 40 mg tablet Take 1 tablet (40 mg total) by mouth daily in the early morning  Qty: 30 tablet, Refills: 0    Associated Diagnoses: Gastrointestinal hemorrhage with hematemesis      potassium chloride (K-DUR,KLOR-CON) 20 mEq tablet Take 20 mEq by mouth 2 (two) times a day      sertraline (ZOLOFT) 100 mg tablet Take 100 mg by mouth 2 (two) times a day      thiamine (VITAMIN B1) 100 mg tablet Take 100 mg by mouth daily           No discharge procedures on file.  ED SEPSIS DOCUMENTATION   Time reflects when diagnosis was documented in both MDM as applicable and the Disposition within this note       Time User Action Codes Description Comment    11/4/2024  6:17  PM Laura Oh Add [I50.9] Acute exacerbation of CHF (congestive heart failure) (HCC)     11/4/2024  6:18 PM Laura Oh Add [R79.89] Elevated brain natriuretic peptide (BNP) level     11/4/2024  6:18 PM Laura Oh Add [R60.1] Anasarca     11/4/2024  7:33 PM Santiago Carrington Add [I50.23] Acute on chronic systolic heart failure (HCC)                  Laura Oh MD  11/04/24 223

## 2024-11-05 ENCOUNTER — APPOINTMENT (INPATIENT)
Dept: NON INVASIVE DIAGNOSTICS | Facility: HOSPITAL | Age: 54
DRG: 291 | End: 2024-11-05
Payer: COMMERCIAL

## 2024-11-05 LAB
ANION GAP SERPL CALCULATED.3IONS-SCNC: 8 MMOL/L (ref 4–13)
AORTIC ROOT: 3.8 CM
ASCENDING AORTA: 3.9 CM
ATRIAL RATE: 76 BPM
AV LVOT MEAN GRADIENT: 1 MMHG
AV LVOT PEAK GRADIENT: 2 MMHG
AV REGURGITATION PRESSURE HALF TIME: 480 MS
BSA FOR ECHO PROCEDURE: 1.94 M2
BUN SERPL-MCNC: 22 MG/DL (ref 5–25)
CALCIUM SERPL-MCNC: 9.1 MG/DL (ref 8.4–10.2)
CHLORIDE SERPL-SCNC: 109 MMOL/L (ref 96–108)
CO2 SERPL-SCNC: 25 MMOL/L (ref 21–32)
CREAT SERPL-MCNC: 1.33 MG/DL (ref 0.6–1.3)
DOP CALC LVOT AREA: 3.8 CM2
DOP CALC LVOT CARDIAC INDEX: 1.49 L/MIN/M2
DOP CALC LVOT CARDIAC OUTPUT: 2.89 L/MIN
DOP CALC LVOT DIAMETER: 2.2 CM
DOP CALC LVOT PEAK VEL VTI: 10.09 CM
DOP CALC LVOT PEAK VEL: 0.64 M/S
DOP CALC LVOT STROKE INDEX: 19.6 ML/M2
DOP CALC LVOT STROKE VOLUME: 38.34
E WAVE DECELERATION TIME: 127 MS
E/A RATIO: 1.75
ERYTHROCYTE [DISTWIDTH] IN BLOOD BY AUTOMATED COUNT: 17.2 % (ref 11.6–15.1)
EST. AVERAGE GLUCOSE BLD GHB EST-MCNC: 143 MG/DL
FRACTIONAL SHORTENING: 17 (ref 28–44)
GFR SERPL CREATININE-BSD FRML MDRD: 59 ML/MIN/1.73SQ M
GLUCOSE SERPL-MCNC: 110 MG/DL (ref 65–140)
GLUCOSE SERPL-MCNC: 143 MG/DL (ref 65–140)
GLUCOSE SERPL-MCNC: 197 MG/DL (ref 65–140)
GLUCOSE SERPL-MCNC: 69 MG/DL (ref 65–140)
GLUCOSE SERPL-MCNC: 74 MG/DL (ref 65–140)
HBA1C MFR BLD: 6.6 %
HCT VFR BLD AUTO: 44.9 % (ref 36.5–49.3)
HGB BLD-MCNC: 14.2 G/DL (ref 12–17)
INTERVENTRICULAR SEPTUM IN DIASTOLE (PARASTERNAL SHORT AXIS VIEW): 1.1 CM
INTERVENTRICULAR SEPTUM: 1.1 CM (ref 0.6–1.1)
LAAS-AP2: 30.3 CM2
LAAS-AP4: 24.5 CM2
LEFT ATRIUM SIZE: 4.5 CM
LEFT ATRIUM VOLUME (MOD BIPLANE): 124 ML
LEFT ATRIUM VOLUME INDEX (MOD BIPLANE): 63.9 ML/M2
LEFT INTERNAL DIMENSION IN SYSTOLE: 4.9 CM (ref 2.1–4)
LEFT VENTRICULAR INTERNAL DIMENSION IN DIASTOLE: 5.9 CM (ref 3.5–6)
LEFT VENTRICULAR POSTERIOR WALL IN END DIASTOLE: 1 CM
LEFT VENTRICULAR STROKE VOLUME: 56 ML
LVSV (TEICH): 56 ML
MCH RBC QN AUTO: 29.9 PG (ref 26.8–34.3)
MCHC RBC AUTO-ENTMCNC: 31.6 G/DL (ref 31.4–37.4)
MCV RBC AUTO: 95 FL (ref 82–98)
MITRAL REGURGITATION PEAK VELOCITY: 4.54 M/S
MITRAL VALVE MEAN INFLOW VELOCITY: 3.58 M/S
MITRAL VALVE REGURGITANT PEAK GRADIENT: 82 MMHG
MV E'TISSUE VEL-LAT: 6 CM/S
MV E'TISSUE VEL-SEP: 3 CM/S
MV PEAK A VEL: 0.44 M/S
MV PEAK E VEL: 77 CM/S
MV STENOSIS PRESSURE HALF TIME: 37 MS
MV VALVE AREA P 1/2 METHOD: 5.95
P AXIS: 53 DEGREES
PLATELET # BLD AUTO: 207 THOUSANDS/UL (ref 149–390)
PMV BLD AUTO: 11 FL (ref 8.9–12.7)
POTASSIUM SERPL-SCNC: 4.3 MMOL/L (ref 3.5–5.3)
PR INTERVAL: 228 MS
QRS AXIS: 167 DEGREES
QRSD INTERVAL: 164 MS
QT INTERVAL: 482 MS
QTC INTERVAL: 542 MS
RA PRESSURE ESTIMATED: 5 MMHG
RBC # BLD AUTO: 4.75 MILLION/UL (ref 3.88–5.62)
RIGHT ATRIAL 2D VOLUME: 110 ML
RIGHT ATRIUM AREA SYSTOLE A4C: 25.5 CM2
RIGHT VENTRICLE ID DIMENSION: 4.1 CM
RV PSP: 37 MMHG
SL CV AV DECELERATION TIME RETROGRADE: 1657 MS
SL CV AV PEAK GRADIENT RETROGRADE: 47 MMHG
SL CV DOP CALC MV VTI RETROGRADE: 166.6 CM
SL CV LEFT ATRIUM LENGTH A2C: 6.2 CM
SL CV LV EF: 25
SL CV MV MEAN GRADIENT RETROGRADE: 56 MMHG
SL CV PED ECHO LEFT VENTRICLE DIASTOLIC VOLUME (MOD BIPLANE) 2D: 171 ML
SL CV PED ECHO LEFT VENTRICLE SYSTOLIC VOLUME (MOD BIPLANE) 2D: 114 ML
SODIUM SERPL-SCNC: 142 MMOL/L (ref 135–147)
T WAVE AXIS: 29 DEGREES
TR MAX PG: 32 MMHG
TR PEAK VELOCITY: 2.8 M/S
TRICUSPID ANNULAR PLANE SYSTOLIC EXCURSION: 1.5 CM
TRICUSPID VALVE PEAK E WAVE VELOCITY: 0.11 M/S
TRICUSPID VALVE PEAK REGURGITATION VELOCITY: 2.84 M/S
VENTRICULAR RATE: 76 BPM
WBC # BLD AUTO: 8.49 THOUSAND/UL (ref 4.31–10.16)

## 2024-11-05 PROCEDURE — 82948 REAGENT STRIP/BLOOD GLUCOSE: CPT

## 2024-11-05 PROCEDURE — 83036 HEMOGLOBIN GLYCOSYLATED A1C: CPT | Performed by: FAMILY MEDICINE

## 2024-11-05 PROCEDURE — 93306 TTE W/DOPPLER COMPLETE: CPT

## 2024-11-05 PROCEDURE — 93306 TTE W/DOPPLER COMPLETE: CPT | Performed by: INTERNAL MEDICINE

## 2024-11-05 PROCEDURE — 85027 COMPLETE CBC AUTOMATED: CPT | Performed by: FAMILY MEDICINE

## 2024-11-05 PROCEDURE — 99233 SBSQ HOSP IP/OBS HIGH 50: CPT | Performed by: HOSPITALIST

## 2024-11-05 PROCEDURE — 93010 ELECTROCARDIOGRAM REPORT: CPT | Performed by: INTERNAL MEDICINE

## 2024-11-05 PROCEDURE — 99222 1ST HOSP IP/OBS MODERATE 55: CPT | Performed by: INTERNAL MEDICINE

## 2024-11-05 PROCEDURE — 80048 BASIC METABOLIC PNL TOTAL CA: CPT | Performed by: FAMILY MEDICINE

## 2024-11-05 RX ORDER — VITAMIN E 268 MG
400 CAPSULE ORAL DAILY
COMMUNITY

## 2024-11-05 RX ORDER — FUROSEMIDE 20 MG/1
20 TABLET ORAL DAILY PRN
COMMUNITY
End: 2024-11-09

## 2024-11-05 RX ORDER — CLINDAMYCIN HYDROCHLORIDE 150 MG/1
150 CAPSULE ORAL 4 TIMES DAILY
COMMUNITY
End: 2024-11-09

## 2024-11-05 RX ORDER — CARBOXYMETHYLCELLULOSE SODIUM 5 MG/ML
1 SOLUTION/ DROPS OPHTHALMIC 3 TIMES DAILY
COMMUNITY

## 2024-11-05 RX ORDER — SILVER SULFADIAZINE 10 MG/G
1 CREAM TOPICAL EVERY OTHER DAY
COMMUNITY
End: 2024-11-09

## 2024-11-05 RX ADMIN — FUROSEMIDE 40 MG: 10 INJECTION, SOLUTION INTRAMUSCULAR; INTRAVENOUS at 08:21

## 2024-11-05 RX ADMIN — MIDODRINE HYDROCHLORIDE 10 MG: 5 TABLET ORAL at 08:21

## 2024-11-05 RX ADMIN — FERROUS SULFATE TAB 325 MG (65 MG ELEMENTAL FE) 325 MG: 325 (65 FE) TAB at 08:21

## 2024-11-05 RX ADMIN — HEPARIN SODIUM 5000 UNITS: 5000 INJECTION, SOLUTION INTRAVENOUS; SUBCUTANEOUS at 05:18

## 2024-11-05 RX ADMIN — LEVETIRACETAM 500 MG: 500 TABLET, FILM COATED ORAL at 08:21

## 2024-11-05 RX ADMIN — THIAMINE HCL TAB 100 MG 100 MG: 100 TAB at 08:22

## 2024-11-05 RX ADMIN — INSULIN GLARGINE 5 UNITS: 100 INJECTION, SOLUTION SUBCUTANEOUS at 08:21

## 2024-11-05 RX ADMIN — INSULIN LISPRO 2 UNITS: 100 INJECTION, SOLUTION INTRAVENOUS; SUBCUTANEOUS at 17:22

## 2024-11-05 RX ADMIN — MIRTAZAPINE 30 MG: 15 TABLET, FILM COATED ORAL at 21:56

## 2024-11-05 RX ADMIN — LAMOTRIGINE 100 MG: 100 TABLET ORAL at 08:21

## 2024-11-05 RX ADMIN — MULTIPLE VITAMINS W/ MINERALS TAB 1 TABLET: TAB ORAL at 08:22

## 2024-11-05 RX ADMIN — BUSPIRONE HYDROCHLORIDE 30 MG: 10 TABLET ORAL at 20:04

## 2024-11-05 RX ADMIN — LEVETIRACETAM 500 MG: 500 TABLET, FILM COATED ORAL at 20:04

## 2024-11-05 RX ADMIN — HEPARIN SODIUM 5000 UNITS: 5000 INJECTION, SOLUTION INTRAVENOUS; SUBCUTANEOUS at 21:56

## 2024-11-05 RX ADMIN — HEPARIN SODIUM 5000 UNITS: 5000 INJECTION, SOLUTION INTRAVENOUS; SUBCUTANEOUS at 13:46

## 2024-11-05 RX ADMIN — PANTOPRAZOLE SODIUM 20 MG: 20 TABLET, DELAYED RELEASE ORAL at 05:19

## 2024-11-05 RX ADMIN — FUROSEMIDE 40 MG: 10 INJECTION, SOLUTION INTRAMUSCULAR; INTRAVENOUS at 17:19

## 2024-11-05 RX ADMIN — BUSPIRONE HYDROCHLORIDE 30 MG: 10 TABLET ORAL at 08:20

## 2024-11-05 RX ADMIN — MIDODRINE HYDROCHLORIDE 10 MG: 5 TABLET ORAL at 20:04

## 2024-11-05 RX ADMIN — SPIRONOLACTONE 50 MG: 25 TABLET ORAL at 08:22

## 2024-11-05 RX ADMIN — Medication 6 MG: at 21:56

## 2024-11-05 NOTE — ASSESSMENT & PLAN NOTE
At baseline    Lab Results   Component Value Date    EGFR 59 11/05/2024    EGFR 55 11/04/2024    EGFR 60 07/07/2023    CREATININE 1.33 (H) 11/05/2024    CREATININE 1.41 (H) 11/04/2024    CREATININE 1.4 (H) 07/07/2023

## 2024-11-05 NOTE — ASSESSMENT & PLAN NOTE
Patient states he is on a sliding scale for both his long-acting and short acting.  Home regimen: Apparently Lantus around 6 units in the morning, does not take any evening dose and is on a sliding scale with meals  Hospital regimen: Lantus 5 units in the morning and insulin sliding scale  HgbA1c pending      Lab Results   Component Value Date    HGBA1C 10.4 (H) 07/04/2023       (P) 185.5

## 2024-11-05 NOTE — PLAN OF CARE
Problem: PAIN - ADULT  Goal: Verbalizes/displays adequate comfort level or baseline comfort level  Description: Interventions:  - Encourage patient to monitor pain and request assistance  - Assess pain using appropriate pain scale  - Administer analgesics based on type and severity of pain and evaluate response  - Implement non-pharmacological measures as appropriate and evaluate response  - Consider cultural and social influences on pain and pain management  - Notify physician/advanced practitioner if interventions unsuccessful or patient reports new pain  Outcome: Progressing     Problem: INFECTION - ADULT  Goal: Absence or prevention of progression during hospitalization  Description: INTERVENTIONS:  - Assess and monitor for signs and symptoms of infection  - Monitor lab/diagnostic results  - Monitor all insertion sites, i.e. indwelling lines, tubes, and drains  - Monitor endotracheal if appropriate and nasal secretions for changes in amount and color  - [place] appropriate cooling/warming therapies per order  - Administer medications as ordered  - Instruct and encourage patient and family to use good hand hygiene technique  - Identify and instruct in appropriate isolation precautions for identified infection/condition  Outcome: Progressing     Problem: SAFETY ADULT  Goal: Patient will remain free of falls  Description: INTERVENTIONS:  - Educate patient/family on patient safety including physical limitations  - Instruct patient to call for assistance with activity   - Consult OT/PT to assist with strengthening/mobility   - Keep Call bell within reach  - Keep bed low and locked with side rails adjusted as appropriate  - Keep care items and personal belongings within reach  - Initiate and maintain comfort rounds  - Make Fall Risk Sign visible to staff  - Offer Toileting every two Hours, in advance of need  - Initiate/Maintain bed alarm  - Obtain necessary fall risk management equipment: non slip socks   - Apply  yellow socks and bracelet for high fall risk patients  - Consider moving patient to room near nurses station  Outcome: Progressing  Goal: Maintain or return to baseline ADL function  Description: INTERVENTIONS:  -  Assess patient's ability to carry out ADLs; assess patient's baseline for ADL function and identify physical deficits which impact ability to perform ADLs (bathing, care of mouth/teeth, toileting, grooming, dressing, etc.)  - Assess/evaluate cause of self-care deficits   - Assess range of motion  - Assess patient's mobility; develop plan if impaired  - Assess patient's need for assistive devices and provide as appropriate  - Encourage maximum independence but intervene and supervise when necessary  - Involve family in performance of ADLs  - Assess for home care needs following discharge   - Consider OT consult to assist with ADL evaluation and planning for discharge  - Provide patient education as appropriate  Outcome: Progressing  Goal: Maintains/Returns to pre admission functional level  Description: INTERVENTIONS:  - Perform AM-PAC 6 Click Basic Mobility/ Daily Activity assessment daily.  - Set and communicate daily mobility goal to care team and patient/family/caregiver.   - Collaborate with rehabilitation services on mobility goals if consulted  - Perform Range of Motion three times a day.  - Reposition patient every two hours.  - Dangle patient three times a day  - Stand patient three times a day  - Ambulate patient three times a day  - Out of bed to chair three times a day   - Out of bed for meals three times a day  - Out of bed for toileting  - Record patient progress and toleration of activity level   Outcome: Progressing     Problem: DISCHARGE PLANNING  Goal: Discharge to home or other facility with appropriate resources  Description: INTERVENTIONS:  - Identify barriers to discharge w/patient and caregiver  - Arrange for needed discharge resources and transportation as appropriate  - Identify  discharge learning needs (meds, wound care, etc.)  - Arrange for interpretive services to assist at discharge as needed  - Refer to Case Management Department for coordinating discharge planning if the patient needs post-hospital services based on physician/advanced practitioner order or complex needs related to functional status, cognitive ability, or social support system  Outcome: Progressing     Problem: Knowledge Deficit  Goal: Patient/family/caregiver demonstrates understanding of disease process, treatment plan, medications, and discharge instructions  Description: Complete learning assessment and assess knowledge base.  Interventions:  - Provide teaching at level of understanding  - Provide teaching via preferred learning methods  Outcome: Progressing     Problem: METABOLIC, FLUID AND ELECTROLYTES - ADULT  Goal: Electrolytes maintained within normal limits  Description: INTERVENTIONS:  - Monitor labs and assess patient for signs and symptoms of electrolyte imbalances  - Administer electrolyte replacement as ordered  - Monitor response to electrolyte replacements, including repeat lab results as appropriate  - Instruct patient on fluid and nutrition as appropriate  Outcome: Progressing  Goal: Fluid balance maintained  Description: INTERVENTIONS:  - Monitor labs   - Monitor I/O and WT  - Instruct patient on fluid and nutrition as appropriate  - Assess for signs & symptoms of volume excess or deficit  Outcome: Progressing

## 2024-11-05 NOTE — ED ATTENDING ATTESTATION
11/4/2024  IEmily DO, saw and evaluated the patient. I have discussed the patient with the resident/non-physician practitioner and agree with the resident's/non-physician practitioner's findings, Plan of Care, and MDM as documented in the resident's/non-physician practitioner's note, except where noted. All available labs and Radiology studies were reviewed.  I was present for key portions of any procedure(s) performed by the resident/non-physician practitioner and I was immediately available to provide assistance.       At this point I agree with the current assessment done in the Emergency Department.  I have conducted an independent evaluation of this patient a history and physical is as follows:    54-year-old male presents via EMS for evaluation.  [facility] however had outpatient labs that were showing elevated BNP and he was sent for evaluation.  Patient does have history of CHF and was formally on Lasix as an as-needed basis however has been taking it daily and continuing to gain weight.  Patient believes he is gained 11 pounds in about 2 weeks; states he was taking lasix daily and started on Zaroxolyn.  He does report dyspnea and a vague chest discomfort.  Patient believes his legs and abdomen are swollen.  He denies recent fevers or URI symptoms, distinct GI upset.  Will assess patient for ACS, CHF, pleural effusions, renal and liver dysfunction. Given weight gain despite diuretic use, may require admission and IV diuretics.    Physical Exam  Vitals reviewed.   Constitutional:       General: He is not in acute distress.     Appearance: Normal appearance. He is not toxic-appearing.   HENT:      Head: Normocephalic and atraumatic.      Right Ear: External ear normal.      Left Ear: External ear normal.      Nose: Nose normal.   Eyes:      General: No scleral icterus.        Right eye: No discharge.         Left eye: No discharge.    Cardiovascular:      Comments: Bl/ edema to lower thighs  Pulmonary:      Effort: Pulmonary effort is normal. No respiratory distress.   Abdominal:      General: There is no distension.      Palpations: Abdomen is soft.      Tenderness: There is no abdominal tenderness. There is no guarding or rebound.   Musculoskeletal:         General: No deformity or signs of injury.   Skin:     General: Skin is warm.   Neurological:      General: No focal deficit present.      Mental Status: He is alert.           ED Course  ED Course as of 11/04/24 2017 Mon Nov 04, 2024   1637 Blood Pressure: 119/91   1637 Temperature(!): 97 °F (36.1 °C)   1637 Temp Source: Temporal   1637 Pulse: 75   1637 Respirations: 18   1637 SpO2: 93 %         Critical Care Time  Procedures

## 2024-11-05 NOTE — CONSULTS
Consultation - Cardiology   Ryan Farnsworth 54 y.o. male MRN: [id number]  Unit/Bed#: 418-01 Encounter: [id number]    Inpatient consult to Cardiology  Consult performed by: Devin Nolen DO  Consult ordered by: Santiago Carrington MD            Physician Requesting Consult: Elvis Cagle DO  Reason for Consult / Principal Problem: CHF    History of Present Illness   HPI: Ryan Farnsworth is a 54 y.o. year old male who has a history of chronic combined systolic and diastolic congestive heart failure, diabetes, seizure disorder, history of myositis, dyslipidemia, presents with increasing weight gain and shortness of breath.  Outpatient labs showed elevated BNP and [facility]  The patient is homebound he has lots of chronic medical problems he has a visiting [facility] that comes in to help him with some daily activities.  He is able to make his own foods he uses a lot of prepackaged premixed foods that are most likely high in sodium.  He was also drinking a fair amount of Gatorade recently.  He noticed that his started to have abdominal distention and some lower extremity edema weight and fluid retention primarily in the thighs and around the abdomen.  Denied any anginal sounding chest pain or discomfort.  Has been no lightheadedness or dizziness.  Denies any PND orthopnea.  Admits to progressive shortness of breath with activity over the past few days.      Review of Systems   Constitutional: Negative.   HENT: Negative.     Eyes: Negative.    Cardiovascular:  Positive for dyspnea on exertion and leg swelling.   Respiratory: Negative.     Endocrine: Negative.    Hematologic/Lymphatic: Negative.    Skin: Negative.    Musculoskeletal: Negative.    Gastrointestinal: Negative.    Genitourinary: Negative.    Neurological: Negative.    Psychiatric/Behavioral: Negative.     All other systems reviewed and are negative.    Historical Information   Past Medical History:  "  Diagnosis Date    Alcoholic hepatitis     Anxiety     Chronic liver disease     Depression     Diabetes (HCC)     Diabetes mellitus (HCC)     Diabetic ulcer of toe of right foot associated with type 2 diabetes mellitus (HCC) 2020    Diabetic ulcer of toe of right foot associated with type 2 diabetes mellitus, with fat layer exposed (HCC) 2020    Hypertension     Iron deficiency anemia     Lactic acidosis 2020    Pancreatitis     PTSD (post-traumatic stress disorder)     Seizure (HCC)     Seizure disorder (HCC) 2020    Seizures (HCC)     Sepsis, unspecified organism (HCC) 2020    Type 2 diabetes mellitus with diabetic polyneuropathy, without long-term current use of insulin (HCC) 2019     Past Surgical History:   Procedure Laterality Date    ABDOMINAL SURGERY      CARDIAC CATHETERIZATION      PANCREATECTOMY      SPLENECTOMY      TONSILLECTOMY       Social History     Substance and Sexual Activity   Alcohol Use Yes    Alcohol/week: 15.0 standard drinks of alcohol    Types: 15 Cans of beer per week    Comment: 15 cans beer daily     Social History     Substance and Sexual Activity   Drug Use Never     Social History     Tobacco Use   Smoking Status Never   Smokeless Tobacco Never     Family History: Family history non-contributory    Meds/Allergies   all current active meds have been reviewed       Allergies   Allergen Reactions    Statins Other (See Comments)     Lost muscles       Objective   Vitals: Blood pressure 118/85, pulse 74, temperature 97.8 °F (36.6 °C), resp. rate 20, height 6' 1\" (1.854 m), weight 71.2 kg (156 lb 15.5 oz), SpO2 92%., Body mass index is 20.71 kg/m².,   Orthostatic Blood Pressures      Flowsheet Row Most Recent Value   Blood Pressure 118/85 filed at 2024 0812   Patient Position - Orthostatic VS Lying filed at 2024 0054          Systolic (24hrs), Av , Min:115 , Max:120     Diastolic (24hrs), Av, Min:82, Max:91      Intake/Output " Summary (Last 24 hours) at 11/5/2024 0946  Last data filed at 11/5/2024 0501  Gross per 24 hour   Intake --   Output 200 ml   Net -200 ml       Weight (last 2 days)       Date/Time Weight    11/05/24 08:12:40 71.2 (156.97)    11/05/24 0558 71.2 (156.97)    11/04/24 1934 74.2 (163.58)    11/04/24 18:43:25 74.2 (163.58)    11/04/24 1634 75.4 (166.23)            Invasive Devices       Peripheral Intravenous Line  Duration             Peripheral IV 11/04/24 Distal;Right;Upper;Ventral (anterior) Arm <1 day                      Physical Exam  Vitals and nursing note reviewed.   Constitutional:       General: He is not in acute distress.     Appearance: He is well-developed.   HENT:      Head: Normocephalic and atraumatic.   Eyes:      Conjunctiva/sclera: Conjunctivae normal.      Pupils: Pupils are equal, round, and reactive to light.   Cardiovascular:      Rate and Rhythm: Normal rate and regular rhythm.      Pulses: Normal pulses.      Heart sounds: Normal heart sounds. No murmur heard.     No friction rub.   Pulmonary:      Effort: Pulmonary effort is normal. No respiratory distress.      Breath sounds: Normal breath sounds. No wheezing or rales.   Abdominal:      General: Bowel sounds are normal. There is no distension.      Palpations: Abdomen is soft.      Tenderness: There is no abdominal tenderness. There is no rebound.   Musculoskeletal:         General: No tenderness or deformity. Normal range of motion.      Cervical back: Neck supple.      Right lower leg: Edema present.      Left lower leg: Edema present.   Skin:     General: Skin is warm and dry.      Findings: No erythema.   Neurological:      Mental Status: He is alert and oriented to person, place, and time.      Cranial Nerves: No cranial nerve deficit.             Laboratory Results:        CBC with diff:   Results from last 7 days   Lab Units 11/05/24  0528 11/04/24  1704   WBC Thousand/uL 8.49 9.86   HEMOGLOBIN g/dL 14.2 14.3   HEMATOCRIT % 44.9 44.8    MCV fL 95 96   PLATELETS Thousands/uL 207 240   RBC Million/uL 4.75 4.67   MCH pg 29.9 30.6   MCHC g/dL 31.6 31.9   RDW % 17.2* 17.4*   MPV fL 11.0 11.5   NRBC AUTO /100 WBCs  --  0         CMP:  Results from last 7 days   Lab Units 11/05/24  0528 11/04/24  1704   POTASSIUM mmol/L 4.3 4.5   CHLORIDE mmol/L 109* 110*   CO2 mmol/L 25 24   BUN mg/dL 22 23   CREATININE mg/dL 1.33* 1.41*   CALCIUM mg/dL 9.1 8.8   AST U/L  --  38   ALT U/L  --  24   ALK PHOS U/L  --  350*   EGFR ml/min/1.73sq m 59 55         BMP:  Results from last 7 days   Lab Units 11/05/24  0528 11/04/24  1704   POTASSIUM mmol/L 4.3 4.5   CHLORIDE mmol/L 109* 110*   CO2 mmol/L 25 24   BUN mg/dL 22 23   CREATININE mg/dL 1.33* 1.41*   CALCIUM mg/dL 9.1 8.8       BNP:    Recent Labs     11/04/24  1704   BNP 3,272*       Magnesium:       Coags:       TSH:       Hemoglobin A1C       Lipid Profile:         Cardiac testing:   No results found for this or any previous visit.    No results found for this or any previous visit.    No results found for this or any previous visit.    No results found for this or any previous visit.        Imaging: Results Review Statement: No pertinent imaging studies reviewed.  XR chest 1 view portable    Result Date: 11/4/2024  Narrative: XR CHEST PORTABLE INDICATION: sob. COMPARISON: July 24, 2022 FINDINGS: There is relatively dense consolidation of the portion of the right lower lung and perhaps medially at the left base as well. There are bilateral pleural effusions. Heart appears mildly prominent. Some of the heart border is obscured. Bones are unremarkable for age. Normal upper abdomen.     Impression: Findings suggest CHF with bilateral pleural effusions and basilar edema although some of the pulmonary density could be atelectasis or potentially even pneumonia in the appropriate clinical setting. Correlate clinically The study was marked in EPIC for immediate notification. Workstation performed: [id number]       EKG  reviewed personally: NSR LBBB  Telemetry reviewed personally: no events    Assessment:  Principal Problem:    Acute on chronic systolic heart failure (HCC)  Active Problems:    Type 2 diabetes mellitus with diabetic polyneuropathy, without long-term current use of insulin (HCC)    Seizure disorder (HCC)    Mixed hyperlipidemia    Orthostatic hypotension    Depression    GERD (gastroesophageal reflux disease)    Stage 3a chronic kidney disease (HCC)      Assesment/ Plan:  #1 acute heart failure with moderately reduced ejection fraction  #2 cardiomyopathy unclear etiology   #3 type 2 diabetes  #4 seizure disorder  #5 orthostatic hypotension  #6 CKD stage III    Recommendations: Patient is homebound and has a lot of chronic comorbidities.  Will try to be more conservative in treatment plan.  He was recommended outpatient stress testing in 2023 for cardiomyopathy which she never got done.  Check echocardiogram to see where ejection fraction is now.  Significant orthostatic hypotension has limited his ability to take any cardiac medications including beta-blockers and afterload reducing agents.  Will start with some IV Lasix 40 IV twice daily diuresis over the next 24 hours we will see how he responds clinically.          Counseling / Coordination of Care  Total floor / unit time spent today 45 minutes.  Greater than 50% of total time was spent with the patient and / or family counseling and / or coordination of care.  A description of the counseling / coordination of care: .        Code Status: Level 1 - Full Code

## 2024-11-05 NOTE — PROGRESS NOTES
Progress Note - Hospitalist   Name: Ryan Farnsworth 54 y.o. male I MRN: [id number]  Unit/Bed#: 418-01 I Date of Admission: 11/4/2024   Date of Service: 11/5/2024 I Hospital Day: 1    Assessment & Plan  Acute on chronic systolic heart failure (HCC)  Patient with bilateral lower extremity edema, shortness of breath and 11 pound weight gain in about 3 and half weeks  CXR consistent with CHF and b/l effusions  Continue Lasix 40 mg IV twice daily  Continue Aldactone 50 mg p.o. daily  Echocardiogram - pending  Consult to cardiology - appreciate  I/O ordered  Daily weights  Monitor electrolytes with IV diuresis  -- patient symptoms feel improved, unfortunately no I/O overnight but weight improved, continue diuresis and follow    Echo April 2023: Shows EF of 45%    Wt Readings from Last 3 Encounters:   11/05/24 71.2 kg (156 lb 15.5 oz)   04/04/23 74.4 kg (164 lb)   04/04/23 74.5 kg (164 lb 3.2 oz)     Type 2 diabetes mellitus with diabetic polyneuropathy, without long-term current use of insulin ([facility])  Patient states he is on a sliding scale for both his long-acting and short acting.  Home regimen: Apparently Lantus around 6 units in the morning, does not take any evening dose and is on a sliding scale with meals  Hospital regimen: Lantus 5 units in the morning and insulin sliding scale  HgbA1c pending      Lab Results   Component Value Date    HGBA1C 10.4 (H) 07/04/2023       (P) 185.5  Seizure disorder (HCC)  Continue Keppra 500 mg p.o. twice daily and Lamictal 100 mg p.o. daily  Mixed hyperlipidemia  Patient does not take a statin.  Orthostatic hypotension  Continue midodrine 10 mg p.o. twice daily  GERD (gastroesophageal reflux disease)  Continue PPI  Depression  Continue BuSpar 30 mg twice daily, Remeron 30 mg at bedtime and Zoloft 100 mg twice daily  Stage 3a chronic kidney disease (HCC)  At baseline    Lab Results   Component Value Date    EGFR 59 11/05/2024    EGFR 55 11/04/2024    EGFR 60 07/07/2023     CREATININE 1.33 (H) 11/05/2024    CREATININE 1.41 (H) 11/04/2024    CREATININE 1.4 (H) 07/07/2023       VTE Pharmacologic Prophylaxis: VTE Score: 4 Moderate Risk (Score 3-4) - Pharmacological DVT Prophylaxis Ordered: heparin.    Mobility:   Basic Mobility Inpatient Raw Score: 19  JH-HLM Goal: 6: Walk 10 steps or more  JH-HLM Achieved: 6: Walk 10 steps or more  JH-HLM Goal achieved. Continue to encourage appropriate mobility.    Patient Centered Rounds: I performed bedside rounds with nursing staff today.   Discussions with Specialists or Other Care Team Provider: none    Education and Discussions with Family / Patient: Patient declined call to .     Current Length of Stay: 1 day(s)  Current Patient Status: Inpatient   Certification Statement: The patient will continue to require additional inpatient hospital stay due to chf  Discharge Plan: Anticipate discharge in 24-48 hrs to home.    Code Status: Level 1 - Full Code    Subjective   Patient states his breathing feels slightly better, but still feels more short of breath than his baseline.  Unsure if his lower extremity edema feels better as he has neuropathy and decreased sensation there.    Objective :  Temp:  [97 °F (36.1 °C)-98.7 °F (37.1 °C)] 97.8 °F (36.6 °C)  HR:  [65-77] 74  BP: (115-120)/(82-91) 118/85  Resp:  [16-20] 20  SpO2:  [90 %-95 %] 92 %  O2 Device: None (Room air)    Body mass index is 20.71 kg/m².     Input and Output Summary (last 24 hours):     Intake/Output Summary (Last 24 hours) at 11/5/2024 0952  Last data filed at 11/5/2024 0950  Gross per 24 hour   Intake 60 ml   Output 800 ml   Net -740 ml       Physical Exam  Vitals and nursing note reviewed.   Constitutional:       General: He is not in acute distress.     Appearance: He is well-developed.   HENT:      Head: Normocephalic and atraumatic.   Eyes:      Conjunctiva/sclera: Conjunctivae normal.   Cardiovascular:      Rate and Rhythm: Normal rate and regular rhythm.    Pulmonary:      Effort: Pulmonary effort is normal. No respiratory distress.      Comments: Faint basilar crackles  Abdominal:      Palpations: Abdomen is soft.      Tenderness: There is no abdominal tenderness.   Musculoskeletal:         General: No swelling.      Cervical back: Neck supple.      Comments: Trace to 1+ pitting edema bilateral lower extremities   Skin:     General: Skin is warm and dry.      Capillary Refill: Capillary refill takes less than 2 seconds.   Neurological:      Mental Status: He is alert.   Psychiatric:         Mood and Affect: Mood normal.           Lines/Drains:        Telemetry:  Telemetry Orders (From admission, onward)               24 Hour Telemetry Monitoring  Continuous x 24 Hours (Telem)        Question:  Reason for 24 Hour Telemetry  Answer:  Decompensated CHF- and any one of the following: continuous diuretic infusion or total diuretic dose >200 mg daily, associated electrolyte derangement (I.e. K < 3.0), ionotropic drip (continuous infusion), hx of ventricular arrhythmia, or new EF < 35%                     Telemetry Reviewed: Normal Sinus Rhythm  Indication for Continued Telemetry Use: No indication for continued use. Will discontinue.                Lab Results: I have reviewed the following results:   Results from last 7 days   Lab Units 11/05/24  0528 11/04/24  1704   WBC Thousand/uL 8.49 9.86   HEMOGLOBIN g/dL 14.2 14.3   HEMATOCRIT % 44.9 44.8   PLATELETS Thousands/uL 207 240   SEGS PCT %  --  67   LYMPHO PCT %  --  23   MONO PCT %  --  7   EOS PCT %  --  2     Results from last 7 days   Lab Units 11/05/24  0528 11/04/24  1704   SODIUM mmol/L 142 141   POTASSIUM mmol/L 4.3 4.5   CHLORIDE mmol/L 109* 110*   CO2 mmol/L 25 24   BUN mg/dL 22 23   CREATININE mg/dL 1.33* 1.41*   ANION GAP mmol/L 8 7   CALCIUM mg/dL 9.1 8.8   ALBUMIN g/dL  --  3.4*   TOTAL BILIRUBIN mg/dL  --  0.58   ALK PHOS U/L  --  350*   ALT U/L  --  24   AST U/L  --  38   GLUCOSE RANDOM mg/dL 69 249*          Results from last 7 days   Lab Units 11/05/24  0746 11/04/24  2113   POC GLUCOSE mg/dl 74 297*               Recent Cultures (last 7 days):         Imaging Results Review: I reviewed radiology reports from this admission including: chest xray.  Other Study Results Review: No additional pertinent studies reviewed.    Last 24 Hours Medication List:     Current Facility-Administered Medications:     acetaminophen (TYLENOL) tablet 650 mg, Q6H PRN    busPIRone (BUSPAR) tablet 30 mg, BID    ferrous sulfate tablet 325 mg, Daily With Breakfast    furosemide (LASIX) injection 40 mg, BID (diuretic)    heparin (porcine) subcutaneous injection 5,000 Units, Q8H HERBERTH    HYDROcodone-acetaminophen (NORCO) 5-325 mg per tablet 1 tablet, Q12H PRN    insulin glargine (LANTUS) subcutaneous injection 5 Units 0.05 mL, QAM    insulin lispro (HumALOG/ADMELOG) 100 units/mL subcutaneous injection 1-6 Units, 4x Daily (with meals and at bedtime) **AND** Fingerstick Glucose (POCT), 4x Daily AC and at bedtime    lamoTRIgine (LaMICtal) tablet 100 mg, Daily    levETIRAcetam (KEPPRA) tablet 500 mg, Q12H HERBERTH    melatonin tablet 6 mg, HS    midodrine (PROAMATINE) tablet 10 mg, BID    mirtazapine (REMERON) tablet 30 mg, HS    multivitamin-minerals (CENTRUM) tablet 1 tablet, Daily    pantoprazole (PROTONIX) EC tablet 20 mg, Early Morning    spironolactone (ALDACTONE) tablet 50 mg, Daily    thiamine tablet 100 mg, Daily    Administrative Statements   Today, Patient Was Seen By: Elvis Cagle DO      **Please Note: This note may have been constructed using a voice recognition system.**

## 2024-11-05 NOTE — ASSESSMENT & PLAN NOTE
Patient with bilateral lower extremity edema, shortness of breath and 11 pound weight gain in about 3 and half weeks  CXR consistent with CHF and b/l effusions  Continue Lasix 40 mg IV twice daily  Continue Aldactone 50 mg p.o. daily  Echocardiogram - pending  Consult to cardiology - appreciate  I/O ordered  Daily weights  Monitor electrolytes with IV diuresis  -- patient symptoms feel improved, unfortunately no I/O overnight but weight improved, continue diuresis and follow    Echo April 2023: Shows EF of 45%    Wt Readings from Last 3 Encounters:   11/05/24 71.2 kg (156 lb 15.5 oz)   04/04/23 74.4 kg (164 lb)   04/04/23 74.5 kg (164 lb 3.2 oz)

## 2024-11-05 NOTE — ASSESSMENT & PLAN NOTE
Patient states he is on a sliding scale for both his long-acting and short acting.  Home regimen: Apparently Lantus around 6 units in the morning, does not take any evening dose and is on a sliding scale with meals  Hospital regimen: Lantus 5 units in the morning and insulin sliding scale  Obtain A1c      Lab Results   Component Value Date    HGBA1C 10.4 (H) 07/04/2023

## 2024-11-05 NOTE — ASSESSMENT & PLAN NOTE
Patient with bilateral lower extremity edema, shortness of breath and 11 pound weight gain in about 3 and half weeks  Lasix 40 mg IV twice daily  Continue Aldactone 50 mg p.o. daily  Echocardiogram  Consult to cardiology  Fluid restriction    Echo April 2023: Shows EF of 45%    Wt Readings from Last 3 Encounters:   11/04/24 74.2 kg (163 lb 9.3 oz)   04/04/23 74.4 kg (164 lb)   04/04/23 74.5 kg (164 lb 3.2 oz)

## 2024-11-05 NOTE — H&P
H&P - Hospitalist   Name: Ryan Farnsworth 54 y.o. male I MRN: [id number]  Unit/Bed#: 418-01 I Date of Admission: 11/4/2024   Date of Service: 11/4/2024 I Hospital Day: 0     Assessment & Plan  Acute on chronic systolic heart failure (HCC)  Patient with bilateral lower extremity edema, shortness of breath and 11 pound weight gain in about 3 and half weeks  Lasix 40 mg IV twice daily  Continue Aldactone 50 mg p.o. daily  Echocardiogram  Consult to cardiology  Fluid restriction    Echo April 2023: Shows EF of 45%    Wt Readings from Last 3 Encounters:   11/04/24 74.2 kg (163 lb 9.3 oz)   04/04/23 74.4 kg (164 lb)   04/04/23 74.5 kg (164 lb 3.2 oz)     Type 2 diabetes mellitus with diabetic polyneuropathy, without long-term current use of insulin ([facility])  Patient states he is on a sliding scale for both his long-acting and short acting.  Home regimen: Apparently Lantus around 6 units in the morning, does not take any evening dose and is on a sliding scale with meals  Hospital regimen: Lantus 5 units in the morning and insulin sliding scale  Obtain A1c      Lab Results   Component Value Date    HGBA1C 10.4 (H) 07/04/2023         Seizure disorder (HCC)  Continue Keppra 500 mg p.o. twice daily and Lamictal 100 mg p.o. daily  Mixed hyperlipidemia  Patient does not take a statin.  Orthostatic hypotension  Continue midodrine 10 mg p.o. twice daily  GERD (gastroesophageal reflux disease)  Continue PPI  Depression  Continue BuSpar 30 mg twice daily, Remeron 30 mg at bedtime and Zoloft 100 mg twice daily  Stage 3a chronic kidney disease (HCC)  At baseline    Lab Results   Component Value Date    EGFR 55 11/04/2024    EGFR 60 07/07/2023    EGFR 60 07/06/2023    CREATININE 1.41 (H) 11/04/2024    CREATININE 1.4 (H) 07/07/2023    CREATININE 1.4 (H) 07/06/2023         Disposition  #1  Lasix 40 mg IV twice daily, Aldactone 50 mg daily  #2  Echocardiogram, okay to cardiology  #3  Lantus and insulin sliding scale  #4  Will need to get  medication reconciliation done tomorrow, patient does not know his meds, may need to call pharmacy          VTE Pharmacologic Prophylaxis: VTE Score: 4 Moderate Risk (Score 3-4) - Pharmacological DVT Prophylaxis Ordered: heparin.  Code Status: Level 1 - Full Code       Anticipated Length of Stay: Patient will be admitted on an inpatient basis with an anticipated length of stay of greater than 2 midnights secondary to shortness of breath, CHF.    History of Present Illness   Chief Complaint: Shortness of breath    Ryan Farnsworth is a 54 y.o. male with a PMH of chronic systolic heart failure, diabetes mellitus type 2 on insulin, home bed status, intolerant to statins, history of seizure disorder, cognitive decline who presents with shortness of breath.  [facility] is homebound [facility]  Had blood work today which showed he was in CHF.  The patient's gained 11 pounds in about 3 weeks and has lower extremity edema.  He takes Aldactone 50 mg daily and Lasix 20 mg as needed for swelling.  The patient at home drinks quite a bit of fluids including Powerade about 32 ounces to 48 ounces, he does 1-1/2 bottles of those plus all the other fluids he consumes..    Review of Systems   Constitutional: Negative.    HENT: Negative.     Respiratory:  Positive for shortness of breath.    Cardiovascular:  Positive for leg swelling.   Gastrointestinal:  Positive for abdominal distention.   Genitourinary: Negative.    Musculoskeletal: Negative.    Neurological: Negative.        Historical Information   Past Medical History:   Diagnosis Date    Alcoholic hepatitis     Anxiety     Chronic liver disease     Depression     Diabetes (HCC)     Diabetes mellitus (HCC)     Diabetic ulcer of toe of right foot associated with type 2 diabetes mellitus (HCC) 06/27/2020    Diabetic ulcer of toe of right foot associated with type 2 diabetes mellitus, with fat layer exposed (HCC) 06/27/2020    Hypertension     Iron deficiency  anemia     Lactic acidosis 06/27/2020    Pancreatitis     PTSD (post-traumatic stress disorder)     Seizure (HCC)     Seizure disorder ([facility]) 06/27/2020    Seizures ([facility])     Sepsis, unspecified organism ([facility]) 06/27/2020    Type 2 diabetes mellitus with diabetic polyneuropathy, without long-term current use of insulin ([facility]) 03/25/2019     Past Surgical History:   Procedure Laterality Date    ABDOMINAL SURGERY      CARDIAC CATHETERIZATION      PANCREATECTOMY      SPLENECTOMY      TONSILLECTOMY       Social History     Tobacco Use    Smoking status: Never    Smokeless tobacco: Never   Vaping Use    Vaping status: Never Used   Substance and Sexual Activity    Alcohol use: Yes     Alcohol/week: 15.0 standard drinks of alcohol     Types: 15 Cans of beer per week     Comment: 15 cans beer daily    Drug use: Never    Sexual activity: Not on file     E-Cigarette/Vaping    E-Cigarette Use Never User      E-Cigarette/Vaping Substances     Family history non-contributory  Social History:  Marital Status:    Occupation: Homebound  Patient Pre-hospital Living Situation: Home  Patient Pre-hospital Level of Mobility: walks with walker  Patient Pre-hospital Diet Restrictions: m/a    Meds/Allergies   I have been unable to obtain / verify an up to date medication list despite all reasonable attempts.  Prior to Admission medications    Medication Sig Start Date End Date Taking? Authorizing Provider   spironolactone (ALDACTONE) 50 mg tablet Take 50 mg by mouth daily 10/30/24  Yes Historical Provider, MD   busPIRone (BUSPAR) 15 mg tablet Take 30 mg by mouth 2 (two) times a day    Historical Provider, MD   calcium carbonate (OS-LUIGI) 600 MG tablet Take 650 mg by mouth 2 (two) times a day with meals    Historical Provider, MD   COLESTIPOL HCL PO Take 1 g by mouth 2 (two) times a day    Historical Provider, MD   cyanocobalamin 1000 MCG tablet Take 100 mcg by mouth daily    Historical Provider, MD   Empagliflozin 25 MG TABS Take 25 mg  by mouth every morning    Historical Provider, MD   ergocalciferol (ERGOCALCIFEROL) 19772 units capsule Take 50,000 Units by mouth once a week    Historical Provider, MD   ferrous sulfate 325 (65 Fe) mg tablet Take 325 mg by mouth daily with breakfast    Historical Provider, MD   folic acid (FOLVITE) 1 mg tablet Take 1 mg by mouth daily    Historical Provider, MD   gemfibrozil (LOPID) 600 mg tablet Take 600 mg by mouth daily    Historical Provider, MD   HYDROcodone-acetaminophen (NORCO) 5-325 mg per tablet Take 1 tablet by mouth every 12 (twelve) hours as needed for pain    Historical Provider, MD   insulin glargine (LANTUS) 100 units/mL subcutaneous injection Inject 30 Units under the skin 2 (two) times a day 3/29/19   Kian Lua,    insulin lispro (HumaLOG) 100 units/mL injection Inject 1-6 Units under the skin 3 (three) times a day before meals 3/29/19   Kian Claudio,    lamoTRIgine (LaMICtal) 25 mg tablet Take 25 mg by mouth daily    Historical Provider, MD   levETIRAcetam (KEPPRA) 500 mg tablet Take 500 mg by mouth every 12 (twelve) hours    Historical Provider, MD   Magnesium Oxide 400 MG CAPS Take 400 mg by mouth 2 (two) times a day    Historical Provider, MD   Melatonin 5 MG CAPS Take 5 mg by mouth daily at bedtime    Historical Provider, MD   metFORMIN (GLUCOPHAGE) 1000 MG tablet Take 1,000 mg by mouth 2 (two) times a day with meals    Historical Provider, MD   midodrine (PROAMATINE) 10 MG tablet Take 1 tablet (10 mg total) by mouth 2 (two) times a day 11/1/22   Benjamin Perez MD   mirtazapine (REMERON) 30 mg tablet Take 45 mg by mouth daily at bedtime     Historical Provider, MD   Multiple Vitamins-Minerals (MULTIVITAMIN WITH MINERALS) tablet Take 1 tablet by mouth daily    Historical Provider, MD   omeprazole (PriLOSEC) 20 mg delayed release capsule Take 20 mg by mouth daily    Historical Provider, MD   pantoprazole (PROTONIX) 40 mg tablet Take 1 tablet (40 mg total) by mouth daily in  the early morning  Patient taking differently: Take 20 mg by mouth daily in the early morning 3/30/19   Kian Luael, DO   potassium chloride (K-DUR,KLOR-CON) 20 mEq tablet Take 20 mEq by mouth 2 (two) times a day    Historical Provider, MD   sertraline (ZOLOFT) 100 mg tablet Take 100 mg by mouth 2 (two) times a day    Historical Provider, MD   thiamine (VITAMIN B1) 100 mg tablet Take 100 mg by mouth daily    Historical Provider, MD     Allergies   Allergen Reactions    Statins Other (See Comments)     Lost muscles       Objective :  Temp:  [97 °F (36.1 °C)-98.6 °F (37 °C)] 98.6 °F (37 °C)  HR:  [72-75] 73  BP: (116-120)/(84-91) 119/84  Resp:  [16-20] 16  SpO2:  [90 %-95 %] 90 %  O2 Device: None (Room air)    Physical Exam  Constitutional:       Appearance: He is normal weight.   HENT:      Head: Normocephalic and atraumatic.      Nose: Nose normal.      Mouth/Throat:      Mouth: Mucous membranes are moist.      Pharynx: Oropharynx is clear.   Eyes:      Extraocular Movements: Extraocular movements intact.      Conjunctiva/sclera: Conjunctivae normal.   Cardiovascular:      Rate and Rhythm: Normal rate and regular rhythm.      Pulses: Normal pulses.      Heart sounds: Normal heart sounds.   Pulmonary:      Effort: Pulmonary effort is normal.      Breath sounds: Normal breath sounds.   Abdominal:      General: There is no distension.      Palpations: Abdomen is soft.      Tenderness: There is no abdominal tenderness. There is no guarding.   Musculoskeletal:         General: Swelling present.      Right lower leg: Edema present.      Left lower leg: Edema present.   Skin:     General: Skin is warm and dry.   Neurological:      General: No focal deficit present.      Mental Status: He is alert. Mental status is at baseline.   Psychiatric:         Mood and Affect: Mood normal.         Behavior: Behavior normal.                 Lab Results: I have reviewed the following results:  Results from last 7 days   Lab  Units 11/04/24  1704   WBC Thousand/uL 9.86   HEMOGLOBIN g/dL 14.3   HEMATOCRIT % 44.8   PLATELETS Thousands/uL 240   SEGS PCT % 67   LYMPHO PCT % 23   MONO PCT % 7   EOS PCT % 2     Results from last 7 days   Lab Units 11/04/24  1704   SODIUM mmol/L 141   POTASSIUM mmol/L 4.5   CHLORIDE mmol/L 110*   CO2 mmol/L 24   BUN mg/dL 23   CREATININE mg/dL 1.41*   ANION GAP mmol/L 7   CALCIUM mg/dL 8.8   ALBUMIN g/dL 3.4*   TOTAL BILIRUBIN mg/dL 0.58   ALK PHOS U/L 350*   ALT U/L 24   AST U/L 38   GLUCOSE RANDOM mg/dL 249*             Lab Results   Component Value Date    HGBA1C 10.4 (H) 07/04/2023    HGBA1C 9.6 (H) 03/21/2023    HGBA1C 10.2 (A) 02/08/2023           Imaging Results Review: I personally reviewed the following image studies in PACS and associated radiology reports: chest xray. My interpretation of the radiology images/reports is: Pulmonary vascular congestion with right pleural effusion.  Other Study Results Review: EKG was reviewed.     Administrative Statements     Medical decision making: Moderate  Diagnosis addressed: Exacerbation of CHF  Data:   Reviewed  CBC, CMP, troponin, A1c, urinalysis, troponin, echocardiogram  Ordered CBC, BMP,, echo  Reviewed external notes from urgent care  Independent interpretation of imaging: Chest x-ray shows pulm vas congestion and right pleural effusion  Independent interpretation of testing EKG/telemetry: Normal sinus rhythm and AV block and left bundle branch block      Risk:  Prescription drug management: IV Lasix        ** Please Note: This note has been constructed using a voice recognition system. **

## 2024-11-05 NOTE — PLAN OF CARE
Problem: PAIN - ADULT  Goal: Verbalizes/displays adequate comfort level or baseline comfort level  Description: Interventions:  - Encourage patient to monitor pain and request assistance  - Assess pain using appropriate pain scale  - Administer analgesics based on type and severity of pain and evaluate response  - Implement non-pharmacological measures as appropriate and evaluate response  - Consider cultural and social influences on pain and pain management  - Notify physician/advanced practitioner if interventions unsuccessful or patient reports new pain  Outcome: Progressing     Problem: INFECTION - ADULT  Goal: Absence or prevention of progression during hospitalization  Description: INTERVENTIONS:  - Assess and monitor for signs and symptoms of infection  - Monitor lab/diagnostic results  - Monitor all insertion sites, i.e. indwelling lines, tubes, and drains  - Monitor endotracheal if appropriate and nasal secretions for changes in amount and color  - Ryan appropriate cooling/warming therapies per order  - Administer medications as ordered  - Instruct and encourage patient and family to use good hand hygiene technique  - Identify and instruct in appropriate isolation precautions for identified infection/condition  Outcome: Progressing     Problem: SAFETY ADULT  Goal: Patient will remain free of falls  Description: INTERVENTIONS:  - Educate patient/family on patient safety including physical limitations  - Instruct patient to call for assistance with activity   - Consult OT/PT to assist with strengthening/mobility   - Keep Call bell within reach  - Keep bed low and locked with side rails adjusted as appropriate  - Keep care items and personal belongings within reach  - Initiate and maintain comfort rounds  - Make Fall Risk Sign visible to staff  - Offer Toileting every two Hours, in advance of need  - Initiate/Maintain bed alarm  - Obtain necessary fall risk management equipment: non slip socks   - Apply  yellow socks and bracelet for high fall risk patients  - Consider moving patient to room near nurses station  Outcome: Progressing  Goal: Maintain or return to baseline ADL function  Description: INTERVENTIONS:  -  Assess patient's ability to carry out ADLs; assess patient's baseline for ADL function and identify physical deficits which impact ability to perform ADLs (bathing, care of mouth/teeth, toileting, grooming, dressing, etc.)  - Assess/evaluate cause of self-care deficits   - Assess range of motion  - Assess patient's mobility; develop plan if impaired  - Assess patient's need for assistive devices and provide as appropriate  - Encourage maximum independence but intervene and supervise when necessary  - Involve family in performance of ADLs  - Assess for home care needs following discharge   - Consider OT consult to assist with ADL evaluation and planning for discharge  - Provide patient education as appropriate  Outcome: Progressing  Goal: Maintains/Returns to pre admission functional level  Description: INTERVENTIONS:  - Perform AM-PAC 6 Click Basic Mobility/ Daily Activity assessment daily.  - Set and communicate daily mobility goal to care team and patient/family/caregiver.   - Collaborate with rehabilitation services on mobility goals if consulted  - Perform Range of Motion three times a day.  - Reposition patient every two hours.  - Dangle patient three times a day  - Stand patient three times a day  - Ambulate patient three times a day  - Out of bed to chair three times a day   - Out of bed for meals three times a day  - Out of bed for toileting  - Record patient progress and toleration of activity level   Outcome: Progressing     Problem: DISCHARGE PLANNING  Goal: Discharge to home or other facility with appropriate resources  Description: INTERVENTIONS:  - Identify barriers to discharge w/patient and caregiver  - Arrange for needed discharge resources and transportation as appropriate  - Identify  discharge learning needs (meds, wound care, etc.)  - Arrange for interpretive services to assist at discharge as needed  - Refer to Case Management Department for coordinating discharge planning if the patient needs post-hospital services based on physician/advanced practitioner order or complex needs related to functional status, cognitive ability, or social support system  Outcome: Progressing     Problem: Knowledge Deficit  Goal: Patient/family/caregiver demonstrates understanding of disease process, treatment plan, medications, and discharge instructions  Description: Complete learning assessment and assess knowledge base.  Interventions:  - Provide teaching at level of understanding  - Provide teaching via preferred learning methods  Outcome: Progressing     Problem: METABOLIC, FLUID AND ELECTROLYTES - ADULT  Goal: Electrolytes maintained within normal limits  Description: INTERVENTIONS:  - Monitor labs and assess patient for signs and symptoms of electrolyte imbalances  - Administer electrolyte replacement as ordered  - Monitor response to electrolyte replacements, including repeat lab results as appropriate  - Instruct patient on fluid and nutrition as appropriate  Outcome: Progressing  Goal: Fluid balance maintained  Description: INTERVENTIONS:  - Monitor labs   - Monitor I/O and WT  - Instruct patient on fluid and nutrition as appropriate  - Assess for signs & symptoms of volume excess or deficit  Outcome: Progressing  Goal: Glucose maintained within target range  Description: INTERVENTIONS:  - Monitor Blood Glucose as ordered  - Assess for signs and symptoms of hyperglycemia and hypoglycemia  - Administer ordered medications to maintain glucose within target range  - Assess nutritional intake and initiate nutrition service referral as needed  Outcome: Progressing     Problem: Nutrition/Hydration-ADULT  Goal: Nutrient/Hydration intake appropriate for improving, restoring or maintaining nutritional  needs  Description: Monitor and assess patient's nutrition/hydration status for malnutrition. Collaborate with interdisciplinary team and initiate plan and interventions as ordered.  Monitor patient's weight and dietary intake as ordered or per policy. Utilize nutrition screening tool and intervene as necessary. Determine patient's food preferences and provide high-protein, high-caloric foods as appropriate.     INTERVENTIONS:  - Monitor oral intake, urinary output, labs, and treatment plans  - Assess nutrition and hydration status and recommend course of action  - Evaluate amount of meals eaten  - Assist patient with eating if necessary   - Allow adequate time for meals  - Recommend/ encourage appropriate diets, oral nutritional supplements, and vitamin/mineral supplements  - Order, calculate, and assess calorie counts as needed  - Recommend, monitor, and adjust tube feedings and TPN/PPN based on assessed needs  - Assess need for intravenous fluids  - Provide specific nutrition/hydration education as appropriate  - Include patient/family/caregiver in decisions related to nutrition  Outcome: Progressing     Problem: Decreased Cardiac Output  Goal: Cardiac output adequate for individual needs  Description: INTERVENTIONS: Monitor for signs and symptoms of decreased cardiac output   - Monitor for dyspnea with exertion and at rest  - Monitor for orthopnea  - Monitor for signs of tachycardia. Place patient on telemetry monitoring.  - Assess patient for jugular vein distention  - Assess patient for lower extremity edema and poor peripheral perfusion   - Auscultate lung sound for Fine bibasilar crackles   - Monitor for cardiac arrythmias   - Administer beta blockers, antiarrhythmic, and blood pressure medications as ordered    Outcome: Progressing     Problem: Impaired Gas Exchange  Goal: Optimize oxygenation and ensure adequate ventilation  Description: INTERVENTIONS: Monitor for signs and symptoms of respiratory  distress                - Elevate HOB or use high fowlers to promote lung expansion                - Administer oxygen as ordered to maintain adequate oxygenation                - Encourage use of IS to promote lung expansion and prevent PN                - Monitor ABGs to assess oxygenation status                - Monitor blood oxygen level to maintain adequate oxygenation                - Encourage cough and deep breathing exercises to promote lung expansion                - Monitor patient's mental status for increased confusion    Outcome: Progressing     Problem: Excess Fluid Volume  Goal: Patient is able to achieve and maintain homeostasis  Description: INTERVENTIONS: Monitor for sign and symptoms of fluid overload  - Evaluate LE edema every shift  - Elevate LE to prevent dependent edema  - Apply CARLI stockings as ordered   - Monitor ankle circumference daily  - Assess for jugular vein distention  - Evaluate provider orders for the CHF diuretic algorithm. Administer diuretics as ordered  - Weigh the patient daily at 0600 and report a weight gain of five pounds or more   - Strict intake and output  - Monitor fluid intake and adhere to fluid restrictions  - Assess lung sounds every shift and as needed  - Monitor vital signs and lab values (CBC, chem, BUN, BNP)  - Measure and document urine output    Outcome: Progressing     Problem: Activity Intolerance  Goal: Patient is able to perform activities within their limitations  Description: INTERVENTIONS:                       -   Alternate periods of activity with periods of rest                 -   Patients is able to maintain normal vitals heart rhythm during activity                 -   Gradually increase activity and exercise as patient can tolerate                 -   Monitor blood pressure and heart before and after exercise                  -   Monitor blood oxygen saturation during activity and apply oxygen as needed    Outcome: Progressing     Problem:  Knowledge Deficit  Goal: Patient is able to verbalize understanding of Heart Failure after education  Description: INTERVENTIONS:  - Educate the patient and family on signs and symptoms of HF  - Provide the patient with HF education and HF zone tool  - Educate on the importance of daily weight in the AM and reporting a weight gain               of 3 or more pounds to their primary care physician  - Monitor for SOB  - Maintain and sodium and fluid restriction  - Educate the patient on the importance of medications such as: diuretics, betablockers,               antiarrhythmics and their purpose, dose, route, side effects and labs               if they are needed    Outcome: Progressing     Problem: Prexisting or High Potential for Compromised Skin Integrity  Goal: Skin integrity is maintained or improved  Description: INTERVENTIONS:  - Identify patients at risk for skin breakdown  - Assess and monitor skin integrity  - Assess and monitor nutrition and hydration status  - Monitor labs   - Assess for incontinence   - Turn and reposition patient  - Assist with mobility/ambulation  - Relieve pressure over bony prominences  - Avoid friction and shearing  - Provide appropriate hygiene as needed including keeping skin clean and dry  - Evaluate need for skin moisturizer/barrier cream  - Collaborate with interdisciplinary team   - Patient/family teaching  - Consider wound care consult   Outcome: Progressing

## 2024-11-05 NOTE — UTILIZATION REVIEW
"Initial Clinical Review    Admission: Date/Time/Statement:   Admission Orders (From admission, onward)       Ordered        11/04/24 1818  INPATIENT ADMISSION  Once                          Orders Placed This Encounter   Procedures    INPATIENT ADMISSION     Standing Status:   Standing     Number of Occurrences:   1     Order Specific Question:   Level of Care     Answer:   Med Surg [16]     Order Specific Question:   Estimated length of stay     Answer:   Not Applicable     ED Arrival Information       Expected   11/4/2024     Arrival   11/4/2024 16:33    Acuity   Emergent              Means of arrival   Ambulance    Escorted by   [facility]    Service   Hospitalist    Admission type   Emergency              Arrival complaint   -             Chief Complaint   Patient presents with    Abnormal Lab     [facility] and \"CHF levels are high\". Also had a 11lb weight gain in 2 weeks chest discomfort       Initial Presentation:   54 yom to ER from home via EMS for abnormal labs. Patient states that [facility] with elevated BNP. States multiple days of chronic chest pain. Associated shortness of breath on exertion relieved by rest, swelling, unintentional weight gain. Takes furosemide and spironolactone. Hx chronic systolic heart failure, diabetes mellitus type 2 on insulin, home bed status, intolerant to statins, history of seizure disorder, cognitive decline. Presents with reported with gained 11 pounds in about 3 weeks and has lower extremity edema. Admission CXR: suggest CHF with bilateral pleural effusions and basilar edema although some of the pulmonary density could be atelectasis or potentially even pneumonia in the appropriate clinical setting. Labs: Cl 110, Cr 1.41, alk phos 350, alb 3.4, gluc 249, elevated troponin, BNP 3272, u/a+blood, prot.   Admitted to inpatient status for acute on chronic CHF. Started on IV diuresis, cardio consulted.    Anticipated Length of " Stay/Certification Statement:   Patient will be admitted on an inpatient basis with an anticipated length of stay of greater than 2 midnights secondary to shortness of breath, CHF.     Date: 11/5/24    Day 2:   Acute CHF. Remains on IV diuresis. Lungs with faint basilar crackles, BLE edema. Cardio following.     Per cardio: CHF  #1 acute heart failure with moderately reduced ejection fraction  #2 cardiomyopathy unclear etiology   #3 type 2 diabetes  #4 seizure disorder  #5 orthostatic hypotension  #6 CKD stage III     Recommendations: Patient is homebound and has a lot of chronic comorbidities.  Will try to be more conservative in treatment plan.  He was recommended outpatient stress testing in 2023 for cardiomyopathy which she never got done.  Check echocardiogram to see where ejection fraction is now.  Significant orthostatic hypotension has limited his ability to take any cardiac medications including beta-blockers and afterload reducing agents.  Will start with some IV Lasix 40 IV twice daily diuresis over the next 24 hours we will see how he responds clinically.    Intake/Output Summary (Last 24 hours) at 11/5/2024 0952  Last data filed at 11/5/2024 0950      Gross per 24 hour   Intake 60 ml   Output 800 ml   Net -740 ml         ED Treatment-Medication Administration from 11/04/2024 1633 to 11/04/2024 1839         Date/Time Order Dose Route Action     11/04/2024 1815 furosemide (LASIX) injection 40 mg 40 mg Intravenous Given            Scheduled Medications:  busPIRone, 30 mg, Oral, BID  ferrous sulfate, 325 mg, Oral, Daily With Breakfast  furosemide, 40 mg, Intravenous, BID (diuretic)  heparin (porcine), 5,000 Units, Subcutaneous, Q8H HERBERTH  insulin glargine, 5 Units, Subcutaneous, QAM  insulin lispro, 1-6 Units, Subcutaneous, 4x Daily (with meals and at bedtime)  lamoTRIgine, 100 mg, Oral, Daily  levETIRAcetam, 500 mg, Oral, Q12H HERBERTH  melatonin, 6 mg, Oral, HS  midodrine, 10 mg, Oral, BID  mirtazapine, 30 mg,  Oral, HS  multivitamin-minerals, 1 tablet, Oral, Daily  pantoprazole, 20 mg, Oral, Early Morning  spironolactone, 50 mg, Oral, Daily  thiamine, 100 mg, Oral, Daily    PRN Meds:  acetaminophen, 650 mg, Oral, Q6H PRN  HYDROcodone-acetaminophen, 1 tablet, Oral, Q12H PRN      ED Triage Vitals [11/04/24 1634]   Temperature Pulse Respirations Blood Pressure SpO2 Pain Score   (!) 97 °F (36.1 °C) 75 18 119/91 93 % 2     Weight (last 2 days)       Date/Time Weight    11/05/24 08:12:40 71.2 (156.97)    11/05/24 0558 71.2 (156.97)    11/04/24 1934 74.2 (163.58)    11/04/24 18:43:25 74.2 (163.58)    11/04/24 1634 75.4 (166.23)            Vital Signs (last 3 days)       Date/Time Temp Pulse Resp BP MAP (mmHg) SpO2 O2 Device Patient Position - Orthostatic VS Pain    11/05/24 0820 -- -- -- -- -- -- None (Room air) -- No Pain    11/05/24 08:12:40 -- 74 -- 118/85 96 92 % -- -- --    11/05/24 07:47:06 97.8 °F (36.6 °C) 69 -- 116/83 94 90 % -- -- --    11/05/24 05:10:56 97.8 °F (36.6 °C) 77 20 115/83 94 94 % -- -- --    11/05/24 00:54:46 98.7 °F (37.1 °C) 65 20 117/82 94 91 % -- Lying --    11/04/24 2100 -- -- -- -- -- -- -- -- 4    11/04/24 1915 -- -- -- -- -- -- -- -- 4    11/04/24 18:43:25 98.6 °F (37 °C) 73 16 119/84 96 90 % None (Room air) Lying --    11/04/24 1800 -- 72 20 116/89 100 94 % None (Room air) Lying --    11/04/24 1700 -- 74 18 120/89 101 95 % None (Room air) Lying --    11/04/24 1634 97 °F (36.1 °C) 75 18 119/91 -- 93 % None (Room air) Sitting 2              Pertinent Labs/Diagnostic Test Results:   Radiology:  XR chest 1 view portable   Final Interpretation by Devin Zheng MD (11/04 2148)      Findings suggest CHF with bilateral pleural effusions and basilar edema although some of the pulmonary density could be atelectasis or potentially even pneumonia in the appropriate clinical setting. Correlate clinically      The study was marked in EPIC for immediate notification.            Workstation performed:  [id number]           Cardiology:  Echo complete    by MARVIN Romano (11/05 0850)      ECG 12 lead   Final Result by Devin Nolen DO (11/05 0750)   Sinus rhythm with 1st degree A-V block   Right axis deviation   Left bundle branch block   Abnormal ECG   Confirmed by Devin Nolen ([id number]) on 11/5/2024 7:50:09 AM        GI:  No orders to display           Results from last 7 days   Lab Units 11/05/24 0528 11/04/24  1704   WBC Thousand/uL 8.49 9.86   HEMOGLOBIN g/dL 14.2 14.3   HEMATOCRIT % 44.9 44.8   PLATELETS Thousands/uL 207 240   TOTAL NEUT ABS Thousands/µL  --  6.59         Results from last 7 days   Lab Units 11/05/24  0528 11/04/24  1704   SODIUM mmol/L 142 141   POTASSIUM mmol/L 4.3 4.5   CHLORIDE mmol/L 109* 110*   CO2 mmol/L 25 24   ANION GAP mmol/L 8 7   BUN mg/dL 22 23   CREATININE mg/dL 1.33* 1.41*   EGFR ml/min/1.73sq m 59 55   CALCIUM mg/dL 9.1 8.8     Results from last 7 days   Lab Units 11/04/24  1704   AST U/L 38   ALT U/L 24   ALK PHOS U/L 350*   TOTAL PROTEIN g/dL 6.5   ALBUMIN g/dL 3.4*   TOTAL BILIRUBIN mg/dL 0.58     Results from last 7 days   Lab Units 11/05/24  0746 11/04/24  2113   POC GLUCOSE mg/dl 74 297*     Results from last 7 days   Lab Units 11/05/24 0528 11/04/24  1704   GLUCOSE RANDOM mg/dL 69 249*             BETA-HYDROXYBUTYRATE   Date Value Ref Range Status   03/25/2019 2.53 (H) 0.02 - 0.27 mmol/L Final                      Results from last 7 days   Lab Units 11/04/24  2116 11/04/24  1920 11/04/24  1704   HS TNI 0HR ng/L  --   --  112*   HS TNI 2HR ng/L  --  77*  --    HSTNI D2 ng/L  --  -35  --    HS TNI 4HR ng/L 64*  --   --    HSTNI D4 ng/L -48  --   --                                  Results from last 7 days   Lab Units 11/04/24  1704   BNP pg/mL 3,272*                                     Results from last 7 days   Lab Units 11/04/24  1745   CLARITY UA  Clear   COLOR UA  Light Yellow   SPEC GRAV UA  1.020   PH UA  5.0   GLUCOSE UA mg/dl Negative   KETONES UA  mg/dl Negative   BLOOD UA  Trace*   PROTEIN UA mg/dl 30 (1+)*   NITRITE UA  Negative   BILIRUBIN UA  Negative   UROBILINOGEN UA (BE) mg/dl <2.0   LEUKOCYTES UA  Negative   WBC UA /hpf 1-2   RBC UA /hpf 1-2   BACTERIA UA /hpf Occasional   EPITHELIAL CELLS WET PREP /hpf None Seen                                                   Past Medical History:   Diagnosis Date    Alcoholic hepatitis     Anxiety     Chronic liver disease     Depression     Diabetes (HCC)     Diabetes mellitus (HCC)     Diabetic ulcer of toe of right foot associated with type 2 diabetes mellitus (HCC) 06/27/2020    Diabetic ulcer of toe of right foot associated with type 2 diabetes mellitus, with fat layer exposed (HCC) 06/27/2020    Hypertension     Iron deficiency anemia     Lactic acidosis 06/27/2020    Pancreatitis     PTSD (post-traumatic stress disorder)     Seizure (HCC)     Seizure disorder (HCC) 06/27/2020    Seizures (HCC)     Sepsis, unspecified organism (HCC) 06/27/2020    Type 2 diabetes mellitus with diabetic polyneuropathy, without long-term current use of insulin (HCC) 03/25/2019     Present on Admission:   Type 2 diabetes mellitus with diabetic polyneuropathy, without long-term current use of insulin (HCC)   Seizure disorder (HCC)   Mixed hyperlipidemia   Acute on chronic systolic heart failure (HCC)   Orthostatic hypotension   GERD (gastroesophageal reflux disease)   Depression   Stage 3a chronic kidney disease (HCC)      Admitting Diagnosis: Anasarca [R60.1]  Abnormal blood findings [R79.9]  Acute exacerbation of CHF (congestive heart failure) (HCC) [I50.9]  Elevated brain natriuretic peptide (BNP) level [R79.89]  Age/Sex: 54 y.o. male    Network Utilization Review Department  ATTENTION: Please call with any questions or concerns to [phone] and carefully listen to the prompts so that you are directed to the right person. All voicemails are confidential.   For Discharge needs, contact Care Management DC Support Team at  [phone] opt. 2  Send all requests for admission clinical reviews, approved or denied determinations and any other requests to dedicated fax number below belonging to the campus where the patient is receiving treatment. List of dedicated fax numbers for the Facilities:  FACILITY NAME UR FAX NUMBER   ADMISSION DENIALS (Administrative/Medical Necessity) [phone]   DISCHARGE SUPPORT TEAM (NETWORK) [phone]   PARENT CHILD HEALTH (Maternity/NICU/Pediatrics) [phone]   General acute hospital [phone]   [facility] [phone]   [facility] [phone]   [facility] [phone]   [facility] [phone]   [facility] [phone]   [facility] [phone]   [facility] [phone]   [facility] [phone]   [facility] [phone]   [facility] [phone]   [facility] [phone]

## 2024-11-05 NOTE — CASE MANAGEMENT
Case Management Assessment & Discharge Planning Note    Patient name Ryan Farnsworth  Location /418-01 MRN [id number]  : 1970 Date 2024       Current Admission Date: 2024  Current Admission Diagnosis:Acute on chronic systolic heart failure (HCC)   Patient Active Problem List    Diagnosis Date Noted Date Diagnosed    H/O splenectomy 2024     Obstructive sleep apnea syndrome 2024     Acute on chronic systolic heart failure (HCC) 2024     Stage 3a chronic kidney disease (HCC) 2024     GERD (gastroesophageal reflux disease) 2023     Alcohol intake above recommended sensible limits 2023     Arteriosclerosis of coronary artery 2023     Mixed hyperlipidemia 2022     Orthostatic hypotension 2022     Diabetic ulcer of toe of right foot associated with type 2 diabetes mellitus ([facility]) 2020     Seizure disorder ([facility]) 2020     PTSD (post-traumatic stress disorder) 2020     Gastrointestinal hemorrhage with hematemesis 2019     Alcoholic ketoacidosis 2019     Type 2 diabetes mellitus with diabetic polyneuropathy, without long-term current use of insulin ([facility]) 2019     Chronic alcohol abuse 2019     SUSIE (acute kidney injury) ([facility]) 2019     Anxiety 2017     Depression 2017     History of seizure 2017     Iron deficiency anemia 2017       LOS (days): 1  Geometric Mean LOS (GMLOS) (days): 3.9  Days to GMLOS:3     OBJECTIVE:    Risk of Unplanned Readmission Score: 13.49         Current admission status: Inpatient  Referral Reason:  (discharge planning)    Preferred Pharmacy:   [facility]  [address]  [place] [address]  Phone: [phone] Fax: [phone]    [id number] [place] [facility] [address]  [place] [address]  Phone: [phone] Fax: [phone]    Primary Care Provider: Guerrero Campa  MD    Primary Insurance: MEDICARE  Secondary Insurance: [facility]    ASSESSMENT:        CM met with patient at the bedside,baseline information  was obtained. CM discussed the role of CM in helping the patient develop a discharge plan and assist the patient in carry out their plan.    Patient lives alone  stair glide out side and stair glide inside to main living area.    Patient baseline patient independent with ADL'S       Active Health Care Proxies    There are no active Health Care Proxies on file.       Advance Directives  Does patient have Advance Directives?: Yes  Advance Directives: Living will, Power of  for health care  Primary Contact: Kathy Hernandez  [phone]         Readmission Root Cause  30 Day Readmission: No    Patient Information  Admitted from:: Home  Mental Status: Alert  During Assessment patient was accompanied by: Not accompanied during assessment  Assessment information provided by:: Patient  Primary Caregiver: Family  Caregiver's Name:: Kathy Hernandez   [phone] Brielle Duke  Caregiver's Relationship to Patient:: Family Member  Caregiver's Telephone Number:: [phone]  Support Systems: Self  County of Residence: [place]  What city do you live in?: [place]  Home entry access options. Select all that apply.: Other access (Comment) (chair lift)  Type of Current Residence: Other (Comment) (duplex with stair glides inside and out side to main living area)  Upon entering residence, is there a bedroom on the main floor (no further steps)?: Yes  Upon entering residence, is there a bathroom on the main floor (no further steps)?: Yes  Living Arrangements: Lives Alone  Is patient a ?: Yes  [facility]: Yes  Is patient service connected?: Yes (100 %)    Activities of Daily Living Prior to Admission  Functional Status: Independent  Completes ADLs independently?: Yes  Ambulates independently?: Yes  Does  patient use assisted devices?: Yes  Assisted Devices (DME) used: Walker, Straight Cane, Stair Chair/[place]  Does patient currently own DME?: Yes  What DME does the patient currently own?: Straight Cane, Walker, Stair Chair/[place]  Does patient have a history of Outpatient Therapy (PT/OT)?: No  Does the patient have a history of Short-Term Rehab?: Yes  Does patient have a history of HHC?: Yes  Does patient currently have HHC?: Yes ([facility]  [facility] Number [phone])    Current Home Health Care  Home Health Agency Name:: Other ([facility]  Current Home Health Follow-Up Provider:: PCP    Patient Information Continued  Income Source: Pension/USP  Does patient have prescription coverage?: Yes  Does patient receive dialysis treatments?: No  Does patient have a history of substance abuse?: No  Does patient have a history of Mental Health Diagnosis?: No         Means of Transportation  Means of Transport to Appts:: Family transport          DISCHARGE DETAILS:    Discharge planning discussed with:: patient  Freedom of Choice: Yes  Comments - Freedom of Choice: Patient wants to stay with his [facility] 2 x week nurse number [phone]  CM contacted family/caregiver?: No- see comments (patient declined)  Were Treatment Team discharge recommendations reviewed with patient/caregiver?: Yes  Did patient/caregiver verbalize understanding of patient care needs?: Yes  Were patient/caregiver advised of the risks associated with not following Treatment Team discharge recommendations?: Yes    Contacts  Patient Contacts: Kathy Hernandez  God Daughter  Relationship to Patient:: Family    Requested Home Health Care         Is the patient interested in HHC at discharge?: Yes ([facility])  Home Health Discipline requested:: Nursing  Home Health Agency Name:: Other ([facility])       CM offered CHF weight mangement program and patient declined  stated he has scale bp and pulse ox machines at home from [facility] they monitor.    Patient stated he works out of  [employer] offices his nurse number is  [phone]    CM to follow for discharge needs.

## 2024-11-05 NOTE — ASSESSMENT & PLAN NOTE
At baseline    Lab Results   Component Value Date    EGFR 55 11/04/2024    EGFR 60 07/07/2023    EGFR 60 07/06/2023    CREATININE 1.41 (H) 11/04/2024    CREATININE 1.4 (H) 07/07/2023    CREATININE 1.4 (H) 07/06/2023

## 2024-11-06 ENCOUNTER — HOSPITAL ENCOUNTER (INPATIENT)
Facility: HOSPITAL | Age: 54
LOS: 3 days | Discharge: HOME | DRG: 286 | End: 2024-11-09
Attending: INTERNAL MEDICINE | Admitting: INTERNAL MEDICINE
Payer: COMMERCIAL

## 2024-11-06 ENCOUNTER — APPOINTMENT (INPATIENT)
Dept: RADIOLOGY | Facility: HOSPITAL | Age: 54
DRG: 291 | End: 2024-11-06
Payer: COMMERCIAL

## 2024-11-06 ENCOUNTER — PATIENT OUTREACH (OUTPATIENT)
Dept: CASE MANAGEMENT | Facility: OTHER | Age: 54
End: 2024-11-06

## 2024-11-06 VITALS
OXYGEN SATURATION: 92 % | TEMPERATURE: 98.4 F | BODY MASS INDEX: 19.52 KG/M2 | HEART RATE: 62 BPM | RESPIRATION RATE: 17 BRPM | HEIGHT: 73 IN | SYSTOLIC BLOOD PRESSURE: 119 MMHG | WEIGHT: 147.27 LBS | DIASTOLIC BLOOD PRESSURE: 87 MMHG

## 2024-11-06 DIAGNOSIS — I42.9 CARDIOMYOPATHY (HCC): Primary | ICD-10-CM

## 2024-11-06 DIAGNOSIS — I50.23 ACUTE ON CHRONIC SYSTOLIC HEART FAILURE (HCC): ICD-10-CM

## 2024-11-06 DIAGNOSIS — I50.23 ACUTE ON CHRONIC SYSTOLIC HEART FAILURE (HCC): Primary | ICD-10-CM

## 2024-11-06 LAB
ANION GAP SERPL CALCULATED.3IONS-SCNC: 8 MMOL/L (ref 4–13)
BUN SERPL-MCNC: 24 MG/DL (ref 5–25)
CALCIUM SERPL-MCNC: 9.4 MG/DL (ref 8.4–10.2)
CHLORIDE SERPL-SCNC: 107 MMOL/L (ref 96–108)
CO2 SERPL-SCNC: 26 MMOL/L (ref 21–32)
CREAT SERPL-MCNC: 1.47 MG/DL (ref 0.6–1.3)
GFR SERPL CREATININE-BSD FRML MDRD: 52 ML/MIN/1.73SQ M
GLUCOSE SERPL-MCNC: 112 MG/DL (ref 65–140)
GLUCOSE SERPL-MCNC: 119 MG/DL (ref 65–140)
GLUCOSE SERPL-MCNC: 145 MG/DL (ref 65–140)
GLUCOSE SERPL-MCNC: 89 MG/DL (ref 65–140)
GLUCOSE SERPL-MCNC: 99 MG/DL (ref 65–140)
POTASSIUM SERPL-SCNC: 4.7 MMOL/L (ref 3.5–5.3)
SODIUM SERPL-SCNC: 141 MMOL/L (ref 135–147)

## 2024-11-06 PROCEDURE — 99232 SBSQ HOSP IP/OBS MODERATE 35: CPT | Performed by: INTERNAL MEDICINE

## 2024-11-06 PROCEDURE — 99223 1ST HOSP IP/OBS HIGH 75: CPT | Performed by: INTERNAL MEDICINE

## 2024-11-06 PROCEDURE — 97163 PT EVAL HIGH COMPLEX 45 MIN: CPT

## 2024-11-06 PROCEDURE — 97167 OT EVAL HIGH COMPLEX 60 MIN: CPT

## 2024-11-06 PROCEDURE — NC001 PR NO CHARGE: Performed by: HOSPITALIST

## 2024-11-06 PROCEDURE — 82948 REAGENT STRIP/BLOOD GLUCOSE: CPT

## 2024-11-06 PROCEDURE — 80048 BASIC METABOLIC PNL TOTAL CA: CPT | Performed by: HOSPITALIST

## 2024-11-06 PROCEDURE — 71045 X-RAY EXAM CHEST 1 VIEW: CPT

## 2024-11-06 RX ORDER — MIRTAZAPINE 15 MG/1
30 TABLET, FILM COATED ORAL
Status: CANCELLED | OUTPATIENT
Start: 2024-11-06

## 2024-11-06 RX ORDER — MIDODRINE HYDROCHLORIDE 5 MG/1
10 TABLET ORAL 2 TIMES DAILY
Status: CANCELLED | OUTPATIENT
Start: 2024-11-06

## 2024-11-06 RX ORDER — MIRTAZAPINE 30 MG/1
30 TABLET, FILM COATED ORAL
Status: DISCONTINUED | OUTPATIENT
Start: 2024-11-06 | End: 2024-11-09 | Stop reason: HOSPADM

## 2024-11-06 RX ORDER — FUROSEMIDE 40 MG/1
40 TABLET ORAL DAILY
Status: DISCONTINUED | OUTPATIENT
Start: 2024-11-07 | End: 2024-11-07

## 2024-11-06 RX ORDER — ACETAMINOPHEN 325 MG/1
650 TABLET ORAL EVERY 6 HOURS PRN
Status: CANCELLED | OUTPATIENT
Start: 2024-11-06

## 2024-11-06 RX ORDER — FUROSEMIDE 40 MG/1
40 TABLET ORAL DAILY
Status: DISCONTINUED | OUTPATIENT
Start: 2024-11-06 | End: 2024-11-06 | Stop reason: HOSPADM

## 2024-11-06 RX ORDER — FERROUS SULFATE 325(65) MG
325 TABLET ORAL
Status: DISCONTINUED | OUTPATIENT
Start: 2024-11-07 | End: 2024-11-09 | Stop reason: HOSPADM

## 2024-11-06 RX ORDER — INSULIN GLARGINE 100 [IU]/ML
5 INJECTION, SOLUTION SUBCUTANEOUS EVERY MORNING
Status: DISCONTINUED | OUTPATIENT
Start: 2024-11-07 | End: 2024-11-09 | Stop reason: HOSPADM

## 2024-11-06 RX ORDER — HEPARIN SODIUM 5000 [USP'U]/ML
5000 INJECTION, SOLUTION INTRAVENOUS; SUBCUTANEOUS EVERY 8 HOURS SCHEDULED
Status: DISCONTINUED | OUTPATIENT
Start: 2024-11-06 | End: 2024-11-09 | Stop reason: HOSPADM

## 2024-11-06 RX ORDER — INSULIN LISPRO 100 [IU]/ML
1-6 INJECTION, SOLUTION INTRAVENOUS; SUBCUTANEOUS
Status: CANCELLED | OUTPATIENT
Start: 2024-11-06

## 2024-11-06 RX ORDER — HEPARIN SODIUM 5000 [USP'U]/ML
5000 INJECTION, SOLUTION INTRAVENOUS; SUBCUTANEOUS EVERY 8 HOURS SCHEDULED
Status: CANCELLED | OUTPATIENT
Start: 2024-11-06

## 2024-11-06 RX ORDER — LEVETIRACETAM 500 MG/1
500 TABLET ORAL EVERY 12 HOURS SCHEDULED
Status: DISCONTINUED | OUTPATIENT
Start: 2024-11-06 | End: 2024-11-09 | Stop reason: HOSPADM

## 2024-11-06 RX ORDER — FUROSEMIDE 40 MG/1
40 TABLET ORAL DAILY
Status: CANCELLED | OUTPATIENT
Start: 2024-11-07

## 2024-11-06 RX ORDER — INSULIN LISPRO 100 [IU]/ML
1-6 INJECTION, SOLUTION INTRAVENOUS; SUBCUTANEOUS
Status: DISCONTINUED | OUTPATIENT
Start: 2024-11-06 | End: 2024-11-09 | Stop reason: HOSPADM

## 2024-11-06 RX ORDER — INSULIN GLARGINE 100 [IU]/ML
5 INJECTION, SOLUTION SUBCUTANEOUS EVERY MORNING
Status: CANCELLED | OUTPATIENT
Start: 2024-11-07

## 2024-11-06 RX ORDER — SPIRONOLACTONE 25 MG/1
50 TABLET ORAL DAILY
Status: CANCELLED | OUTPATIENT
Start: 2024-11-07

## 2024-11-06 RX ORDER — LAMOTRIGINE 100 MG/1
100 TABLET ORAL DAILY
Status: CANCELLED | OUTPATIENT
Start: 2024-11-07

## 2024-11-06 RX ORDER — LAMOTRIGINE 100 MG/1
100 TABLET ORAL DAILY
Status: DISCONTINUED | OUTPATIENT
Start: 2024-11-07 | End: 2024-11-09 | Stop reason: HOSPADM

## 2024-11-06 RX ORDER — HYDROCODONE BITARTRATE AND ACETAMINOPHEN 5; 325 MG/1; MG/1
1 TABLET ORAL EVERY 12 HOURS PRN
Status: DISCONTINUED | OUTPATIENT
Start: 2024-11-06 | End: 2024-11-09 | Stop reason: HOSPADM

## 2024-11-06 RX ORDER — PANTOPRAZOLE SODIUM 20 MG/1
20 TABLET, DELAYED RELEASE ORAL
Status: CANCELLED | OUTPATIENT
Start: 2024-11-07

## 2024-11-06 RX ORDER — BUSPIRONE HYDROCHLORIDE 10 MG/1
30 TABLET ORAL 2 TIMES DAILY
Status: CANCELLED | OUTPATIENT
Start: 2024-11-06

## 2024-11-06 RX ORDER — FERROUS SULFATE 325(65) MG
325 TABLET ORAL
Status: CANCELLED | OUTPATIENT
Start: 2024-11-07

## 2024-11-06 RX ORDER — BUSPIRONE HYDROCHLORIDE 10 MG/1
30 TABLET ORAL 2 TIMES DAILY
Status: DISCONTINUED | OUTPATIENT
Start: 2024-11-06 | End: 2024-11-09 | Stop reason: HOSPADM

## 2024-11-06 RX ORDER — ACETAMINOPHEN 325 MG/1
650 TABLET ORAL EVERY 6 HOURS PRN
Status: DISCONTINUED | OUTPATIENT
Start: 2024-11-06 | End: 2024-11-09 | Stop reason: HOSPADM

## 2024-11-06 RX ORDER — SPIRONOLACTONE 50 MG/1
50 TABLET, FILM COATED ORAL DAILY
Status: DISCONTINUED | OUTPATIENT
Start: 2024-11-07 | End: 2024-11-09 | Stop reason: HOSPADM

## 2024-11-06 RX ORDER — LANOLIN ALCOHOL/MO/W.PET/CERES
100 CREAM (GRAM) TOPICAL DAILY
Status: CANCELLED | OUTPATIENT
Start: 2024-11-07

## 2024-11-06 RX ORDER — MIDODRINE HYDROCHLORIDE 5 MG/1
10 TABLET ORAL 2 TIMES DAILY
Status: DISCONTINUED | OUTPATIENT
Start: 2024-11-06 | End: 2024-11-09 | Stop reason: HOSPADM

## 2024-11-06 RX ORDER — LEVETIRACETAM 500 MG/1
500 TABLET ORAL EVERY 12 HOURS SCHEDULED
Status: CANCELLED | OUTPATIENT
Start: 2024-11-06

## 2024-11-06 RX ORDER — HYDROCODONE BITARTRATE AND ACETAMINOPHEN 5; 325 MG/1; MG/1
1 TABLET ORAL EVERY 12 HOURS PRN
Status: CANCELLED | OUTPATIENT
Start: 2024-11-06

## 2024-11-06 RX ORDER — PANTOPRAZOLE SODIUM 20 MG/1
20 TABLET, DELAYED RELEASE ORAL
Status: DISCONTINUED | OUTPATIENT
Start: 2024-11-07 | End: 2024-11-09 | Stop reason: HOSPADM

## 2024-11-06 RX ORDER — LANOLIN ALCOHOL/MO/W.PET/CERES
100 CREAM (GRAM) TOPICAL DAILY
Status: DISCONTINUED | OUTPATIENT
Start: 2024-11-07 | End: 2024-11-09 | Stop reason: HOSPADM

## 2024-11-06 RX ADMIN — THIAMINE HCL TAB 100 MG 100 MG: 100 TAB at 09:24

## 2024-11-06 RX ADMIN — MIDODRINE HYDROCHLORIDE 10 MG: 5 TABLET ORAL at 22:43

## 2024-11-06 RX ADMIN — BUSPIRONE HYDROCHLORIDE 30 MG: 10 TABLET ORAL at 22:43

## 2024-11-06 RX ADMIN — Medication 6 MG: at 22:44

## 2024-11-06 RX ADMIN — FUROSEMIDE 40 MG: 40 TABLET ORAL at 09:25

## 2024-11-06 RX ADMIN — LAMOTRIGINE 100 MG: 100 TABLET ORAL at 09:25

## 2024-11-06 RX ADMIN — LEVETIRACETAM 500 MG: 500 TABLET, FILM COATED ORAL at 22:43

## 2024-11-06 RX ADMIN — MIDODRINE HYDROCHLORIDE 10 MG: 5 TABLET ORAL at 09:25

## 2024-11-06 RX ADMIN — MULTIPLE VITAMINS W/ MINERALS TAB 1 TABLET: TAB ORAL at 09:24

## 2024-11-06 RX ADMIN — MIRTAZAPINE 30 MG: 30 TABLET, FILM COATED ORAL at 22:43

## 2024-11-06 RX ADMIN — Medication 12.5 MG: at 09:24

## 2024-11-06 RX ADMIN — HEPARIN SODIUM 5000 UNITS: 5000 INJECTION, SOLUTION INTRAVENOUS; SUBCUTANEOUS at 22:44

## 2024-11-06 RX ADMIN — HEPARIN SODIUM 5000 UNITS: 5000 INJECTION, SOLUTION INTRAVENOUS; SUBCUTANEOUS at 05:08

## 2024-11-06 RX ADMIN — SPIRONOLACTONE 50 MG: 25 TABLET ORAL at 09:24

## 2024-11-06 RX ADMIN — HEPARIN SODIUM 5000 UNITS: 5000 INJECTION, SOLUTION INTRAVENOUS; SUBCUTANEOUS at 13:07

## 2024-11-06 RX ADMIN — INSULIN GLARGINE 5 UNITS: 100 INJECTION, SOLUTION SUBCUTANEOUS at 09:25

## 2024-11-06 RX ADMIN — LEVETIRACETAM 500 MG: 500 TABLET, FILM COATED ORAL at 09:25

## 2024-11-06 RX ADMIN — PANTOPRAZOLE SODIUM 20 MG: 20 TABLET, DELAYED RELEASE ORAL at 05:08

## 2024-11-06 RX ADMIN — FERROUS SULFATE TAB 325 MG (65 MG ELEMENTAL FE) 325 MG: 325 (65 FE) TAB at 09:24

## 2024-11-06 RX ADMIN — BUSPIRONE HYDROCHLORIDE 30 MG: 10 TABLET ORAL at 09:25

## 2024-11-06 RX ADMIN — Medication 12.5 MG: at 22:43

## 2024-11-06 NOTE — PROGRESS NOTES
"Cardiology Progress Note - Ryan Farnsworth 54 y.o. male MRN: [id number]    Assesment/ Plan:  #1 acute heart failure with moderately reduced ejection fraction  #2 cardiomyopathy EF 25%  unclear etiology  #3 type 2 diabetes  #4 seizure disorder  #5 orthostatic hypotension  #6 CKD stage III    Recommendations: Echocardiogram shows worsening cardiomyopathy.  He had a cath 6 years ago that showed nonobstructive disease.  We talked about treatment options including left heart catheterization to define coronary anatomy and slow up titration of medical therapy versus just conservative medical therapy at this point in time.  He does have significant physical limitations due to myopathy however he is able to get around his house and feels his quality life has been good.  I will discuss his case with the [place] interventionalists and see if they are willing to do left heart cath.  He is unable to tolerate much in terms of medications for cardiomyopathy due to orthostatic hypotension.  Potential PCI made offer some chance of improvement in LV function.  For now we will start with adding a very low-dose of metoprolol to see if he tolerates this.  No afterload reducing agents at this point in time.  Discontinue IV Lasix transition to oral 40 mg daily.  Continue spironolactone.  He did have significant skeletal myopathy from the statins we talked about addition of nonstatin medications including Zetia and consideration of Repatha outpatient.  Diabetes has significantly improved with an A1c down to 6.6 this year.  Repeat chest x-ray consider thoracentesis to remove remaining fluid.        Subjective:    No significant events overnight.  States his breathing is more comfortable.  Denies any palpitations, lightheadedness, dizziness.  Out of bed back and forth to the bathroom with a walker.    ROS    Objective:   Vitals: Blood pressure 124/89, pulse 72, temperature 98.3 °F (36.8 °C), resp. rate 17, height 6' 1\" (1.854 m), weight " 66.8 kg (147 lb 4.3 oz), SpO2 92%., Body mass index is 19.43 kg/m².,   Orthostatic Blood Pressures      Flowsheet Row Most Recent Value   Blood Pressure 124/89 filed at 2024 0651   Patient Position - Orthostatic VS Lying filed at 2024 0054           Systolic (24hrs), Av , Min:119 , Max:124     Diastolic (24hrs), Av, Min:85, Max:89      Intake/Output Summary (Last 24 hours) at 2024 0821  Last data filed at 2024 0300  Gross per 24 hour   Intake 300 ml   Output 1350 ml   Net -1050 ml     Weight (last 2 days)       Date/Time Weight    24 0541 66.8 (147.27)    24 08:12:40 71.2 (156.97)    24 0558 71.2 (156.97)    24 1934 74.2 (163.58)    24 18:43:25 74.2 (163.58)    24 1634 75.4 (166.23)              Telemetry Review: No significant arrhythmias seen on telemetry review.   EKG personally reviewed by Devin Nolen DO.     Physical Exam  Vitals and nursing note reviewed.   Constitutional:       General: He is not in acute distress.     Appearance: He is well-developed.   HENT:      Head: Normocephalic and atraumatic.   Eyes:      Conjunctiva/sclera: Conjunctivae normal.      Pupils: Pupils are equal, round, and reactive to light.   Cardiovascular:      Rate and Rhythm: Normal rate and regular rhythm.      Pulses: Normal pulses.      Heart sounds: Normal heart sounds. No murmur heard.     No friction rub.   Pulmonary:      Effort: Pulmonary effort is normal. No respiratory distress.      Breath sounds: Normal breath sounds. No wheezing or rales.   Abdominal:      General: Bowel sounds are normal. There is no distension.      Palpations: Abdomen is soft.      Tenderness: There is no abdominal tenderness. There is no rebound.   Musculoskeletal:         General: No tenderness or deformity. Normal range of motion.      Cervical back: Neck supple.      Right lower leg: No edema.      Left lower leg: No edema.   Skin:     General: Skin is warm and dry.       Findings: No erythema.   Neurological:      Mental Status: He is alert and oriented to person, place, and time.      Cranial Nerves: No cranial nerve deficit.           Laboratory Results:        CBC with diff:   Results from last 7 days   Lab Units 11/05/24 0528 11/04/24  1704   WBC Thousand/uL 8.49 9.86   HEMOGLOBIN g/dL 14.2 14.3   HEMATOCRIT % 44.9 44.8   MCV fL 95 96   PLATELETS Thousands/uL 207 240   RBC Million/uL 4.75 4.67   MCH pg 29.9 30.6   MCHC g/dL 31.6 31.9   RDW % 17.2* 17.4*   MPV fL 11.0 11.5   NRBC AUTO /100 WBCs  --  0         CMP:  Results from last 7 days   Lab Units 11/06/24 0431 11/05/24 0528 11/04/24  1704   POTASSIUM mmol/L 4.7 4.3 4.5   CHLORIDE mmol/L 107 109* 110*   CO2 mmol/L 26 25 24   BUN mg/dL 24 22 23   CREATININE mg/dL 1.47* 1.33* 1.41*   CALCIUM mg/dL 9.4 9.1 8.8   AST U/L  --   --  38   ALT U/L  --   --  24   ALK PHOS U/L  --   --  350*   EGFR ml/min/1.73sq m 52 59 55         BMP:  Results from last 7 days   Lab Units 11/06/24 0431 11/05/24 0528 11/04/24  1704   POTASSIUM mmol/L 4.7 4.3 4.5   CHLORIDE mmol/L 107 109* 110*   CO2 mmol/L 26 25 24   BUN mg/dL 24 22 23   CREATININE mg/dL 1.47* 1.33* 1.41*   CALCIUM mg/dL 9.4 9.1 8.8       BNP:   Recent Labs     11/04/24  1704   BNP 3,272*       Magnesium:       Coags:       TSH:        Hemoglobin A1C   Results from last 7 days   Lab Units 11/05/24 0528   HEMOGLOBIN A1C % 6.6*       Lipid Profile:       Cardiac testing:   No results found for this or any previous visit.    No results found for this or any previous visit.    No results found for this or any previous visit.    No results found for this or any previous visit.      Meds/Allergies   all current active meds have been reviewed    Medications Prior to Admission:     busPIRone (BUSPAR) 15 mg tablet    carboxymethylcellulose 0.5 % SOLN    clindamycin (CLEOCIN) 150 mg capsule    ergocalciferol (ERGOCALCIFEROL) 55361 units capsule    ferrous sulfate 325 (65 Fe) mg tablet     folic acid (FOLVITE) 1 mg tablet    furosemide (LASIX) 20 mg tablet    gemfibrozil (LOPID) 600 mg tablet    glucose 4 g chewable tablet    HYDROcodone-acetaminophen (NORCO) 5-325 mg per tablet    lamoTRIgine (LaMICtal) 25 mg tablet    levETIRAcetam (KEPPRA) 500 mg tablet    Magnesium Oxide 400 MG CAPS    Melatonin 5 MG CAPS    metFORMIN (GLUCOPHAGE) 1000 MG tablet    midodrine (PROAMATINE) 10 MG tablet    mirtazapine (REMERON) 30 mg tablet    Multiple Vitamins-Minerals (MULTIVITAMIN WITH MINERALS) tablet    omeprazole (PriLOSEC) 20 mg delayed release capsule    potassium chloride (K-DUR,KLOR-CON) 20 mEq tablet    sertraline (ZOLOFT) 100 mg tablet    silver sulfadiazine (SILVADENE,SSD) 1 % cream    spironolactone (ALDACTONE) 50 mg tablet    thiamine (VITAMIN B1) 100 mg tablet    vitamin E, tocopherol, 400 units capsule       Assessment:  Principal Problem:    Acute on chronic systolic heart failure (HCC)  Active Problems:    Type 2 diabetes mellitus with diabetic polyneuropathy, without long-term current use of insulin (HCC)    Seizure disorder (HCC)    Mixed hyperlipidemia    Orthostatic hypotension    Depression    GERD (gastroesophageal reflux disease)    Stage 3a chronic kidney disease (HCC)            Counseling / Coordination of Care  Total floor / unit time spent today 25 minutes.  Greater than 50% of total time was spent with the patient and / or family counseling and / or coordination of care.  A description of the counseling / coordination of care: .

## 2024-11-06 NOTE — DISCHARGE SUMMARY
Discharge Summary - Hospitalist   Name: Ryan Farnsworth 54 y.o. male I MRN: [id number]  Unit/Bed#: 418-01 I Date of Admission: 11/4/2024   Date of Service: 11/6/2024 I Hospital Day: 2     Assessment & Plan  Acute on chronic systolic heart failure (HCC)  Patient with bilateral lower extremity edema, shortness of breath and 11 pound weight gain in about 3 and half weeks  CXR consistent with CHF and b/l effusions  S/p IV diuresis  Transitioned back to 40mg Lasix daily, after return to baseline weight  Continue Aldactone 50 mg p.o. daily  Echocardiogram - new reduced EF of 25%, severe global hypokinesis. Prior mild hypokinesis EF 45% in 4/2023  Consult to cardiology - appreciate  I/O ordered, Daily weights  Patient feels improved clinically  Given new reduced EF, Cardiology recommending cardiac catheterization    Started on low dose BB Metoprolol 12.5mg BID - he has had difficulties with GDMT due to orthostatic hypotension - so far not orthostatic after metoprolol given, ambulated well.      Wt Readings from Last 3 Encounters:   11/06/24 66.8 kg (147 lb 4.3 oz)   04/04/23 74.4 kg (164 lb)   04/04/23 74.5 kg (164 lb 3.2 oz)     Type 2 diabetes mellitus with diabetic polyneuropathy, without long-term current use of insulin ([facility])  Patient states he is on a sliding scale for both his long-acting and short acting.  Home regimen: Apparently Lantus around 6 units in the morning, does not take any evening dose and is on a sliding scale with meals  Hospital regimen: Lantus 5 units in the morning and insulin sliding scale  HgbA1c 6.6, notable improvement from prior      Lab Results   Component Value Date    HGBA1C 6.6 (H) 11/05/2024       (P) 146.75  Seizure disorder (HCC)  Continue Keppra 500 mg p.o. twice daily and Lamictal 100 mg p.o. daily  Mixed hyperlipidemia  Patient does not take a statin. He has notable myopathies previously with statins  Orthostatic hypotension  Continue midodrine 10 mg p.o. twice daily  GERD  (gastroesophageal reflux disease)  Continue PPI  Depression  Continue BuSpar 30 mg twice daily, Remeron 30 mg at bedtime and Zoloft 100 mg twice daily  Stage 3a chronic kidney disease (HCC)  At baseline    Lab Results   Component Value Date    EGFR 52 11/06/2024    EGFR 59 11/05/2024    EGFR 55 11/04/2024    CREATININE 1.47 (H) 11/06/2024    CREATININE 1.33 (H) 11/05/2024    CREATININE 1.41 (H) 11/04/2024        Medical Problems       Resolved Problems  Date Reviewed: 6/28/2020   None       Discharging Physician / Practitioner: Elvis Cagle DO  PCP: Guerrero Campa MD  Admission Date:   Admission Orders (From admission, onward)       Ordered        11/04/24 1818  INPATIENT ADMISSION  Once                          Discharge/Transfer Date: 11/06/24    Disposition:   Transfer to: [facility]  Reason for Transfer: Cardiac Cath  Accepting Provider at [facility]: Dr. Wade    Consultations During Hospital Stay:  Cardiology    Procedures Performed:   none    Significant Findings / Test Results:   XR chest portable   Final Result by Devin Zheng MD (11/06 1333)      Stable exam with pleural effusions and bibasilar consolidations redemonstrated            Workstation performed: [id number]         XR chest 1 view portable   Final Result by Devin Zheng MD (11/04 2148)      Findings suggest CHF with bilateral pleural effusions and basilar edema although some of the pulmonary density could be atelectasis or potentially even pneumonia in the appropriate clinical setting. Correlate clinically      The study was marked in EPIC for immediate notification.            Workstation performed: [id number]           Echo 11/5    Left Ventricle: Left ventricular cavity size is normal. Wall thickness is normal. The left ventricular ejection fraction is 25%. Systolic function is severely reduced. There is severe global hypokinesis with regional variation. Diastolic function is abnormal.    Right Ventricle:  Right ventricular cavity size is mildly dilated. Systolic function is moderately reduced.    Left Atrium: The atrium is mildly dilated.    Right Atrium: The atrium is mildly dilated.    Aortic Valve: There is mild regurgitation.    Mitral Valve: There is mild to moderate regurgitation.    Tricuspid Valve: There is mild to moderate regurgitation. The estimated right ventricular systolic pressure is 37.00 mmHg.    Pericardium: There is a left pleural effusion. There is a right pleural effusion.      Incidental Findings:   See above   I reviewed the above mentioned incidental findings with the patient and/or family and they expressed understanding.    Test Results Pending at Discharge (will require follow up):   none     Outpatient Tests Requested:  TBD    Complications:  none    Reason for Admission: SoB    Hospital Course:   Ryan Farnsworth is a 54 y.o. male patient who originally presented to the hospital on 11/4/2024 due to shortness of breath and lower extremity edema, patient admitted for acute on chronic congestive heart failure.  Patient was started on IV diuresis and responded quite well, was transition back to oral diuretics given return to base weight.  As part of workup, patient's echocardiogram revealed a new reduced ejection fraction of 25% with severe global hypokinesis, unknown etiology of his cardiomyopathy.  Patient was evaluated by cardiology and recommended for cardiac catheterization.  Patient recommended for transfer to [facility], has been accepted.  Of note, he has had difficulties with goal-directed medical therapy for his previously known cardiomyopathy, due to associated orthostatic hypotension.  He has been started on a beta-blocker and can be started on further goal-directed medical therapy as tolerates.           Please see above list of diagnoses and related plan for additional information.     Condition at Discharge: good    Discharge Day Visit / Exam:   Subjective: Patient  "overall feels improved, his breathing is improved.  He feels much better than admission.  Denies any chest pain.  Vitals: Blood Pressure: 119/87 (11/06/24 1423)  Pulse: 62 (11/06/24 1423)  Temperature: 98.4 °F (36.9 °C) (11/06/24 1423)  Temp Source: Oral (11/05/24 0054)  Respirations: 17 (11/06/24 0651)  Height: 6' 1\" (185.4 cm) (11/05/24 0812)  Weight - Scale: 66.8 kg (147 lb 4.3 oz) (11/06/24 0541)  SpO2: 92 % (11/06/24 1423)  Physical Exam  Vitals and nursing note reviewed.   Constitutional:       General: He is not in acute distress.     Appearance: He is well-developed.   HENT:      Head: Normocephalic and atraumatic.   Eyes:      Conjunctiva/sclera: Conjunctivae normal.   Cardiovascular:      Rate and Rhythm: Normal rate and regular rhythm.   Pulmonary:      Effort: Pulmonary effort is normal. No respiratory distress.      Comments: Faint basilar crackles  Abdominal:      Palpations: Abdomen is soft.      Tenderness: There is no abdominal tenderness.   Musculoskeletal:         General: No swelling.      Cervical back: Neck supple.      Comments: Trace bilateral lower extremity edema    Skin:     General: Skin is warm and dry.      Capillary Refill: Capillary refill takes less than 2 seconds.   Neurological:      Mental Status: He is alert.   Psychiatric:         Mood and Affect: Mood normal.         Discussion with Family: Patient declined call to .      Administrative Statements   Discharge Statement:  I have spent a total time of 40 minutes in caring for this patient on the day of the visit/encounter. >30 minutes of time was spent on: Diagnostic results, Counseling / Coordination of care, Documenting in the medical record, Reviewing / ordering tests, medicine, procedures  , and Communicating with other healthcare professionals .    **Please Note: This note may have been constructed using a voice recognition system.**  "

## 2024-11-06 NOTE — OCCUPATIONAL THERAPY NOTE
Occupational Therapy Evaluation     Patient Name: Ryan Farnsworth  Today's Date: 11/6/2024  Problem List  Principal Problem:    Acute on chronic systolic heart failure (HCC)  Active Problems:    Type 2 diabetes mellitus with diabetic polyneuropathy, without long-term current use of insulin (HCC)    Seizure disorder (HCC)    Mixed hyperlipidemia    Orthostatic hypotension    Depression    GERD (gastroesophageal reflux disease)    Stage 3a chronic kidney disease (HCC)    Past Medical History  Past Medical History:   Diagnosis Date    Alcoholic hepatitis     Anxiety     Chronic liver disease     Depression     Diabetes (HCC)     Diabetes mellitus (HCC)     Diabetic ulcer of toe of right foot associated with type 2 diabetes mellitus (HCC) 06/27/2020    Diabetic ulcer of toe of right foot associated with type 2 diabetes mellitus, with fat layer exposed (HCC) 06/27/2020    Hypertension     Iron deficiency anemia     Lactic acidosis 06/27/2020    Pancreatitis     PTSD (post-traumatic stress disorder)     Seizure (HCC)     Seizure disorder (HCC) 06/27/2020    Seizures (HCC)     Sepsis, unspecified organism (HCC) 06/27/2020    Type 2 diabetes mellitus with diabetic polyneuropathy, without long-term current use of insulin (HCC) 03/25/2019     Past Surgical History  Past Surgical History:   Procedure Laterality Date    ABDOMINAL SURGERY      CARDIAC CATHETERIZATION      PANCREATECTOMY      SPLENECTOMY      TONSILLECTOMY               11/06/24 0958   OT Last Visit   OT Visit Date 11/06/24   Note Type   Note type Evaluation   Pain Assessment   Pain Assessment Tool 0-10   Pain Score 4   Pain Location/Orientation Location: Generalized   Restrictions/Precautions   Weight Bearing Precautions Per Order No   Other Precautions Fall Risk;Pain   Home Living   Type of Home House   Home Layout Multi-level;Bed/bath upstairs;Other (Comment)  (stairglide to enter home and stairglide between all floors; laundry on 1st floor)   Bathroom  "Shower/Tub Walk-in shower   Bathroom Toilet Raised   Bathroom Equipment Grab bars in shower;Shower chair   Bathroom Accessibility Accessible   Home Equipment Walker;Cane;Wheelchair-manual;Grab bars   Additional Comments pt reports \"couch canes\" as well in the home   Prior Function   Level of [place] Independent with functional mobility;Independent with ADLs;Independent with IADLS   Lives With Alone   Receives Help From Home health  (nurse x2 days a week for routine bloodwork)   IADLs Family/Friend/Other provides transportation  (reports [employer] and doord[employer] for grocery shopping)   Falls in the last 6 months (S)  >10   Vocational On disability   Comments pt reports use of RW or W/C during mobility   Subjective   Subjective \"I just don't have any feeling, nothing\"   ADL   Where Assessed Edge of bed   LB Dressing Assistance 5  Supervision/Setup   LB Dressing Deficit Don/doff R sock;Don/doff L sock   Additional Comments pt seated EOB and dons socks with crossover method   Bed Mobility   Supine to Sit 5  Supervision   Additional items Bedrails   Sit to Supine 5  Supervision   Additional items Bedrails   Additional Comments pt on RA during session; SpO2 WFL with mild complaitns of sOB   Transfers   Sit to Stand 5  Supervision   Additional items Increased time required;Verbal cues   Stand to Sit 5  Supervision   Additional items Increased time required;Verbal cues   Additional Comments pt performs functional transfers with (S) level and use of RW; no significant LOB or instability, however reports decreased feeling in his feet and difficulties with mobilization and increased falls due to the same   Functional Mobility   Functional Mobility 5  Supervision   Additional Comments pt performs ~250ft of functional mobility with RW; no significant LOB, however mild instability   Additional items Rolling walker   Balance   Static Sitting Good   Dynamic Sitting Good   Static Standing Fair +   Dynamic Standing Fair + "   Ambulatory Fair   Activity Tolerance   Activity Tolerance Patient limited by fatigue;Patient limited by pain   RUE Assessment   RUE Assessment WFL   LUE Assessment   LUE Assessment WFL   Hand Function   Gross Motor Coordination Functional   Fine Motor Coordination Functional   Sensation   Light Touch No apparent deficits   Sharp/Dull No apparent deficits   Psychosocial   Psychosocial (WDL) X   Patient Behaviors/Mood Flat affect   Cognition   Overall Cognitive Status WFL   Arousal/Participation Alert   Attention Within functional limits   Orientation Level Oriented X4   Memory Within functional limits   Following Commands Follows all commands and directions without difficulty   Assessment   Limitation Decreased ADL status;Decreased UE strength;Decreased Safe judgement during ADL;Decreased endurance;Decreased self-care trans;Decreased high-level ADLs   Assessment Pt is a 54 y.o. male seen for OT evaluation s/p admit to [facility] on 11/4/2024 w/ Acute on chronic systolic heart failure (HCC).  Comorbidities affecting pt's functional performance at time of assessment include:  DM, seizure disorder, HLD, orthostatic hypotension, depression, GERD, CHF, CKD . Personal factors affecting pt at time of IE include:difficulty performing ADLS, difficulty performing IADLS , flat affect, decreased initiation and engagement , and health management . Prior to admission, pt was (I) with ADLs and IADLs with use of SPC or RW during mobility. Upon evaluation: Pt requires (S) level with RW during mobility 2* the following deficits impacting occupational performance: weakness, decreased strength, decreased balance, decreased tolerance, impaired initiation, decreased safety awareness, and increased pain. Pt to benefit from continued skilled OT tx while in the hospital to address deficits as defined above and maximize level of functional independence w ADL's and functional mobility. Occupational Performance areas to address include: grooming,  bathing/shower, toilet hygiene, dressing, functional mobility, community mobility, and clothing management. The patient's raw score on the AM-PAC Daily Activity Inpatient Short Form is 21. A raw score of greater than or equal to 19 suggests the patient may benefit from discharge to home. Discharge recommendation at this time is level III minimum resource intensity.  Pt benefited from co-evaluation of skilled OT and PT therapists in order to most appropriately address functional deficits d/t extensive assistance required for safe functional mobility, decreased activity tolerance, and regression from functioning level prior to admission and/or onset of present illness. OT/PT objectives were addressed separately; please see PT note for specific goal areas targeted.   Goals   Patient Goals to go home   Short Term Goal  pt will perform UE strengthening exercises   Long Term Goal #1 pt will perform UB/LB bathing and grooming tasks at (I) level   Long Term Goal #2 pt will demonstrate functional mobility with RW at mod (I) level   Long Term Goal pt will demonstrate toilet transfers and hygiene at (I) level   Plan   Treatment Interventions ADL retraining;Functional transfer training;UE strengthening/ROM;Endurance training;Patient/family training;Equipment evaluation/education;Activityengagement   Goal Expiration Date 11/20/24   OT Frequency 3-5x/wk   Discharge Recommendation   Rehab Resource Intensity Level, OT III (Minimum Resource Intensity)   AM-PAC Daily Activity Inpatient   Lower Body Dressing 3   Bathing 3   Toileting 3   Upper Body Dressing 4   Grooming 4   Eating 4   Daily Activity Raw Score 21   Daily Activity Standardized Score (Calc for Raw Score >=11) 44.27   AM-PAC Applied Cognition Inpatient   Following a Speech/Presentation 4   Understanding Ordinary Conversation 4   Taking Medications 4   Remembering Where Things Are Placed or Put Away 3   Remembering List of 4-5 Errands 3   Taking Care of Complicated Tasks 3    Applied Cognition Raw Score 21   Applied Cognition Standardized Score 44.3

## 2024-11-06 NOTE — ASSESSMENT & PLAN NOTE
Patient with bilateral lower extremity edema, shortness of breath and 11 pound weight gain in about 3 and half weeks  CXR consistent with CHF and b/l effusions  S/p IV diuresis  Transitioned back to 40mg Lasix daily, after return to baseline weight  Continue Aldactone 50 mg p.o. daily  Echocardiogram - new reduced EF of 25%, severe global hypokinesis. Prior mild hypokinesis EF 45% in 4/2023  Consult to cardiology - appreciate  I/O ordered, Daily weights  Patient feels improved clinically  Given new reduced EF, Cardiology recommending cardiac catheterization    Started on low dose BB Metoprolol 12.5mg BID - he has had difficulties with GDMT due to orthostatic hypotension - so far not orthostatic after metoprolol given, ambulated well.      Wt Readings from Last 3 Encounters:   11/06/24 66.8 kg (147 lb 4.3 oz)   04/04/23 74.4 kg (164 lb)   04/04/23 74.5 kg (164 lb 3.2 oz)

## 2024-11-06 NOTE — PLAN OF CARE
Problem: PAIN - ADULT  Goal: Verbalizes/displays adequate comfort level or baseline comfort level  Description: Interventions:  - Encourage patient to monitor pain and request assistance  - Assess pain using appropriate pain scale  - Administer analgesics based on type and severity of pain and evaluate response  - Implement non-pharmacological measures as appropriate and evaluate response  - Consider cultural and social influences on pain and pain management  - Notify physician/advanced practitioner if interventions unsuccessful or patient reports new pain  Outcome: Progressing     Problem: INFECTION - ADULT  Goal: Absence or prevention of progression during hospitalization  Description: INTERVENTIONS:  - Assess and monitor for signs and symptoms of infection  - Monitor lab/diagnostic results  - Monitor all insertion sites, i.e. indwelling lines, tubes, and drains  - Monitor endotracheal if appropriate and nasal secretions for changes in amount and color  - [place] appropriate cooling/warming therapies per order  - Administer medications as ordered  - Instruct and encourage patient and family to use good hand hygiene technique  - Identify and instruct in appropriate isolation precautions for identified infection/condition  Outcome: Progressing     Problem: SAFETY ADULT  Goal: Patient will remain free of falls  Description: INTERVENTIONS:  - Educate patient/family on patient safety including physical limitations  - Instruct patient to call for assistance with activity   - Consult OT/PT to assist with strengthening/mobility   - Keep Call bell within reach  - Keep bed low and locked with side rails adjusted as appropriate  - Keep care items and personal belongings within reach  - Initiate and maintain comfort rounds  - Make Fall Risk Sign visible to staff  - Offer Toileting every two Hours, in advance of need  - Initiate/Maintain bed alarm  - Obtain necessary fall risk management equipment: non slip socks   - Apply  yellow socks and bracelet for high fall risk patients  - Consider moving patient to room near nurses station  Outcome: Progressing  Goal: Maintain or return to baseline ADL function  Description: INTERVENTIONS:  -  Assess patient's ability to carry out ADLs; assess patient's baseline for ADL function and identify physical deficits which impact ability to perform ADLs (bathing, care of mouth/teeth, toileting, grooming, dressing, etc.)  - Assess/evaluate cause of self-care deficits   - Assess range of motion  - Assess patient's mobility; develop plan if impaired  - Assess patient's need for assistive devices and provide as appropriate  - Encourage maximum independence but intervene and supervise when necessary  - Involve family in performance of ADLs  - Assess for home care needs following discharge   - Consider OT consult to assist with ADL evaluation and planning for discharge  - Provide patient education as appropriate  Outcome: Progressing  Goal: Maintains/Returns to pre admission functional level  Description: INTERVENTIONS:  - Perform AM-PAC 6 Click Basic Mobility/ Daily Activity assessment daily.  - Set and communicate daily mobility goal to care team and patient/family/caregiver.   - Collaborate with rehabilitation services on mobility goals if consulted  - Perform Range of Motion three times a day.  - Reposition patient every two hours.  - Dangle patient three times a day  - Stand patient three times a day  - Ambulate patient three times a day  - Out of bed to chair three times a day   - Out of bed for meals three times a day  - Out of bed for toileting  - Record patient progress and toleration of activity level   Outcome: Progressing     Problem: DISCHARGE PLANNING  Goal: Discharge to home or other facility with appropriate resources  Description: INTERVENTIONS:  - Identify barriers to discharge w/patient and caregiver  - Arrange for needed discharge resources and transportation as appropriate  - Identify  discharge learning needs (meds, wound care, etc.)  - Arrange for interpretive services to assist at discharge as needed  - Refer to Case Management Department for coordinating discharge planning if the patient needs post-hospital services based on physician/advanced practitioner order or complex needs related to functional status, cognitive ability, or social support system  Outcome: Progressing     Problem: Knowledge Deficit  Goal: Patient/family/caregiver demonstrates understanding of disease process, treatment plan, medications, and discharge instructions  Description: Complete learning assessment and assess knowledge base.  Interventions:  - Provide teaching at level of understanding  - Provide teaching via preferred learning methods  Outcome: Progressing     Problem: METABOLIC, FLUID AND ELECTROLYTES - ADULT  Goal: Electrolytes maintained within normal limits  Description: INTERVENTIONS:  - Monitor labs and assess patient for signs and symptoms of electrolyte imbalances  - Administer electrolyte replacement as ordered  - Monitor response to electrolyte replacements, including repeat lab results as appropriate  - Instruct patient on fluid and nutrition as appropriate  Outcome: Progressing  Goal: Fluid balance maintained  Description: INTERVENTIONS:  - Monitor labs   - Monitor I/O and WT  - Instruct patient on fluid and nutrition as appropriate  - Assess for signs & symptoms of volume excess or deficit  Outcome: Progressing  Goal: Glucose maintained within target range  Description: INTERVENTIONS:  - Monitor Blood Glucose as ordered  - Assess for signs and symptoms of hyperglycemia and hypoglycemia  - Administer ordered medications to maintain glucose within target range  - Assess nutritional intake and initiate nutrition service referral as needed  Outcome: Progressing     Problem: Nutrition/Hydration-ADULT  Goal: Nutrient/Hydration intake appropriate for improving, restoring or maintaining nutritional  needs  Description: Monitor and assess patient's nutrition/hydration status for malnutrition. Collaborate with interdisciplinary team and initiate plan and interventions as ordered.  Monitor patient's weight and dietary intake as ordered or per policy. Utilize nutrition screening tool and intervene as necessary. Determine patient's food preferences and provide high-protein, high-caloric foods as appropriate.     INTERVENTIONS:  - Monitor oral intake, urinary output, labs, and treatment plans  - Assess nutrition and hydration status and recommend course of action  - Evaluate amount of meals eaten  - Assist patient with eating if necessary   - Allow adequate time for meals  - Recommend/ encourage appropriate diets, oral nutritional supplements, and vitamin/mineral supplements  - Order, calculate, and assess calorie counts as needed  - Recommend, monitor, and adjust tube feedings and TPN/PPN based on assessed needs  - Assess need for intravenous fluids  - Provide specific nutrition/hydration education as appropriate  - Include patient/family/caregiver in decisions related to nutrition  Outcome: Progressing     Problem: Decreased Cardiac Output  Goal: Cardiac output adequate for individual needs  Description: INTERVENTIONS: Monitor for signs and symptoms of decreased cardiac output   - Monitor for dyspnea with exertion and at rest  - Monitor for orthopnea  - Monitor for signs of tachycardia. Place patient on telemetry monitoring.  - Assess patient for jugular vein distention  - Assess patient for lower extremity edema and poor peripheral perfusion   - Auscultate lung sound for Fine bibasilar crackles   - Monitor for cardiac arrythmias   - Administer beta blockers, antiarrhythmic, and blood pressure medications as ordered    Outcome: Progressing     Problem: Impaired Gas Exchange  Goal: Optimize oxygenation and ensure adequate ventilation  Description: INTERVENTIONS: Monitor for signs and symptoms of respiratory  distress                - Elevate HOB or use high fowlers to promote lung expansion                - Administer oxygen as ordered to maintain adequate oxygenation                - Encourage use of IS to promote lung expansion and prevent PN                - Monitor ABGs to assess oxygenation status                - Monitor blood oxygen level to maintain adequate oxygenation                - Encourage cough and deep breathing exercises to promote lung expansion                - Monitor patient's mental status for increased confusion    Outcome: Progressing     Problem: Excess Fluid Volume  Goal: Patient is able to achieve and maintain homeostasis  Description: INTERVENTIONS: Monitor for sign and symptoms of fluid overload  - Evaluate LE edema every shift  - Elevate LE to prevent dependent edema  - Apply CARLI stockings as ordered   - Monitor ankle circumference daily  - Assess for jugular vein distention  - Evaluate provider orders for the CHF diuretic algorithm. Administer diuretics as ordered  - Weigh the patient daily at 0600 and report a weight gain of five pounds or more   - Strict intake and output  - Monitor fluid intake and adhere to fluid restrictions  - Assess lung sounds every shift and as needed  - Monitor vital signs and lab values (CBC, chem, BUN, BNP)  - Measure and document urine output    Outcome: Progressing     Problem: Activity Intolerance  Goal: Patient is able to perform activities within their limitations  Description: INTERVENTIONS:                       -   Alternate periods of activity with periods of rest                 -   Patients is able to maintain normal vitals heart rhythm during activity                 -   Gradually increase activity and exercise as patient can tolerate                 -   Monitor blood pressure and heart before and after exercise                  -   Monitor blood oxygen saturation during activity and apply oxygen as needed    Outcome: Progressing     Problem:  Knowledge Deficit  Goal: Patient is able to verbalize understanding of Heart Failure after education  Description: INTERVENTIONS:  - Educate the patient and family on signs and symptoms of HF  - Provide the patient with HF education and HF zone tool  - Educate on the importance of daily weight in the AM and reporting a weight gain               of 3 or more pounds to their primary care physician  - Monitor for SOB  - Maintain and sodium and fluid restriction  - Educate the patient on the importance of medications such as: diuretics, betablockers,               antiarrhythmics and their purpose, dose, route, side effects and labs               if they are needed    Outcome: Progressing     Problem: Prexisting or High Potential for Compromised Skin Integrity  Goal: Skin integrity is maintained or improved  Description: INTERVENTIONS:  - Identify patients at risk for skin breakdown  - Assess and monitor skin integrity  - Assess and monitor nutrition and hydration status  - Monitor labs   - Assess for incontinence   - Turn and reposition patient  - Assist with mobility/ambulation  - Relieve pressure over bony prominences  - Avoid friction and shearing  - Provide appropriate hygiene as needed including keeping skin clean and dry  - Evaluate need for skin moisturizer/barrier cream  - Collaborate with interdisciplinary team   - Patient/family teaching  - Consider wound care consult   Outcome: Progressing

## 2024-11-06 NOTE — PLAN OF CARE
Problem: PAIN - ADULT  Goal: Verbalizes/displays adequate comfort level or baseline comfort level  Description: Interventions:  - Encourage patient to monitor pain and request assistance  - Assess pain using appropriate pain scale  - Administer analgesics based on type and severity of pain and evaluate response  - Implement non-pharmacological measures as appropriate and evaluate response  - Consider cultural and social influences on pain and pain management  - Notify physician/advanced practitioner if interventions unsuccessful or patient reports new pain  Outcome: Progressing     Problem: INFECTION - ADULT  Goal: Absence or prevention of progression during hospitalization  Description: INTERVENTIONS:  - Assess and monitor for signs and symptoms of infection  - Monitor lab/diagnostic results  - Monitor all insertion sites, i.e. indwelling lines, tubes, and drains  - Monitor endotracheal if appropriate and nasal secretions for changes in amount and color  - [place] appropriate cooling/warming therapies per order  - Administer medications as ordered  - Instruct and encourage patient and family to use good hand hygiene technique  - Identify and instruct in appropriate isolation precautions for identified infection/condition  Outcome: Progressing     Problem: Knowledge Deficit  Goal: Patient/family/caregiver demonstrates understanding of disease process, treatment plan, medications, and discharge instructions  Description: Complete learning assessment and assess knowledge base.  Interventions:  - Provide teaching at level of understanding  - Provide teaching via preferred learning methods  Outcome: Progressing     Problem: METABOLIC, FLUID AND ELECTROLYTES - ADULT  Goal: Electrolytes maintained within normal limits  Description: INTERVENTIONS:  - Monitor labs and assess patient for signs and symptoms of electrolyte imbalances  - Administer electrolyte replacement as ordered  - Monitor response to electrolyte  replacements, including repeat lab results as appropriate  - Instruct patient on fluid and nutrition as appropriate  Outcome: Progressing  Goal: Fluid balance maintained  Description: INTERVENTIONS:  - Monitor labs   - Monitor I/O and WT  - Instruct patient on fluid and nutrition as appropriate  - Assess for signs & symptoms of volume excess or deficit  Outcome: Progressing  Goal: Glucose maintained within target range  Description: INTERVENTIONS:  - Monitor Blood Glucose as ordered  - Assess for signs and symptoms of hyperglycemia and hypoglycemia  - Administer ordered medications to maintain glucose within target range  - Assess nutritional intake and initiate nutrition service referral as needed  Outcome: Progressing

## 2024-11-06 NOTE — ASSESSMENT & PLAN NOTE
Patient states he is on a sliding scale for both his long-acting and short acting.  Home regimen: Apparently Lantus around 6 units in the morning, does not take any evening dose and is on a sliding scale with meals  Hospital regimen: Lantus 5 units in the morning and insulin sliding scale  HgbA1c 6.6, notable improvement from prior      Lab Results   Component Value Date    HGBA1C 6.6 (H) 11/05/2024       (P) 146.75

## 2024-11-06 NOTE — ASSESSMENT & PLAN NOTE
At baseline    Lab Results   Component Value Date    EGFR 52 11/06/2024    EGFR 59 11/05/2024    EGFR 55 11/04/2024    CREATININE 1.47 (H) 11/06/2024    CREATININE 1.33 (H) 11/05/2024    CREATININE 1.41 (H) 11/04/2024

## 2024-11-06 NOTE — PHYSICAL THERAPY NOTE
Physical Therapy Evaluation    Patient Name: Ryan Farnsworth    Today's Date: 11/6/2024     Problem List  Principal Problem:    Acute on chronic systolic heart failure (HCC)  Active Problems:    Type 2 diabetes mellitus with diabetic polyneuropathy, without long-term current use of insulin (HCC)    Seizure disorder (HCC)    Mixed hyperlipidemia    Orthostatic hypotension    Depression    GERD (gastroesophageal reflux disease)    Stage 3a chronic kidney disease (HCC)       Past Medical History  Past Medical History:   Diagnosis Date    Alcoholic hepatitis     Anxiety     Chronic liver disease     Depression     Diabetes (HCC)     Diabetes mellitus (HCC)     Diabetic ulcer of toe of right foot associated with type 2 diabetes mellitus (HCC) 06/27/2020    Diabetic ulcer of toe of right foot associated with type 2 diabetes mellitus, with fat layer exposed (HCC) 06/27/2020    Hypertension     Iron deficiency anemia     Lactic acidosis 06/27/2020    Pancreatitis     PTSD (post-traumatic stress disorder)     Seizure (HCC)     Seizure disorder (HCC) 06/27/2020    Seizures ([facility])     Sepsis, unspecified organism ([facility]) 06/27/2020    Type 2 diabetes mellitus with diabetic polyneuropathy, without long-term current use of insulin ([facility]) 03/25/2019        Past Surgical History  Past Surgical History:   Procedure Laterality Date    ABDOMINAL SURGERY      CARDIAC CATHETERIZATION      PANCREATECTOMY      SPLENECTOMY      TONSILLECTOMY             11/06/24 0959   PT Last Visit   PT Visit Date 11/06/24   Note Type   Note type Evaluation   Pain Assessment   Pain Assessment Tool 0-10   Pain Score 4   Pain Location/Orientation Location: Generalized   Home Living   Type of Home House   Home Layout Multi-level;Bed/bath upstairs  (stairglide to enter home and to 2nd floor)   Bathroom Shower/Tub Walk-in shower   Bathroom Toilet Raised   Bathroom Equipment Grab bars in shower;Shower  "chair;Grab bars around toilet   Bathroom Accessibility Accessible   Home Equipment Cane;Wheelchair-manual;Other (Comment)  (4WW, couch canes)   Additional Comments pt reports using RW at baseline; utilizes w/c occasionally   Prior Function   Level of [place] Independent with ADLs;Independent with functional mobility;Independent with IADLS   Lives With Alone   Receives Help From Home health   IADLs Family/Friend/Other provides meals;Family/Friend/Other provides medication management   Falls in the last 6 months (S)  >10   Vocational On disability   Comments pt reports he is homebound; all medical services come to his home, utilizes [employer] for food, and VNA nurses assist with med management   General   Family/Caregiver Present No   Cognition   Overall Cognitive Status WFL   Arousal/Participation Alert   Orientation Level Oriented X4   Following Commands Follows all commands and directions without difficulty   Comments pt pleasant and cooperative   Subjective   Subjective \"I figured I should take care of myself\"   RLE Assessment   RLE Assessment X  (knee and hip strength functional; mild foot drop noted with ambulation)   LLE Assessment   LLE Assessment X  (knee and hip strength functional; mild foot drop noted with ambulation)   Vision-Basic Assessment   Current Vision Wears glasses for distance only   Light Touch   RLE Light Touch Absent   LLE Light Touch Absent   Bed Mobility   Supine to Sit 5  Supervision   Additional items Increased time required;Verbal cues   Sit to Supine 5  Supervision   Additional items Increased time required;Verbal cues   Transfers   Sit to Stand 5  Supervision   Additional items Increased time required;Verbal cues   Stand to Sit 5  Supervision   Additional items Increased time required;Verbal cues   Additional Comments RW used   Ambulation/Elevation   Gait pattern Short stride;Foward flexed;Decreased foot clearance;Narrow SUZANNE;Antalgic   Gait Assistance 5  Supervision "   Additional items Verbal cues   Assistive Device Rolling walker   Distance 250'   Balance   Static Sitting Good   Dynamic Sitting Good   Static Standing Fair +   Dynamic Standing Fair   Ambulatory Fair  (with RW)   Endurance Deficit   Endurance Deficit Yes   Endurance Deficit Description pt appears fatigued with increased ambulation   Activity Tolerance   Activity Tolerance Patient limited by fatigue;Patient limited by pain   Assessment   Prognosis Good   Problem List Decreased strength;Decreased endurance;Impaired balance;Decreased mobility;Pain   Assessment Patient is a 54 y.o. male evaluated by Physical Therapy s/p admit to [facility] on 11/4/2024 with admitting diagnosis of: Anasarca, Abnormal blood findings, Acute exacerbation of CHF (congestive heart failure), Elevated brain natriuretic peptide (BNP) level, and principal problem of: Acute on chronic systolic heart failure. PT was consulted to assess patient's functional mobility and discharge needs. Ordered are PT Evaluation and treatment with activity level of: up as tolerated. Comorbidities affecting patient's physical performance at time of assessment include:  DM, seizure disorder, mixed HLD, orthostatic hypotension, GERD, CKD, PTSD, h/o splenectomy, SERGEI . Personal factors affecting the patient at time of IE include: lives in 2 story home, ambulating with assistive device, history of fall(s), inability/difficulty performing IADLs, and inability/difficulty performing ADLs. Please locate objective findings from PT assessment regarding body systems outlined above. Upon evaluation, pt able to perform all functional mobility with SUP, RW, and increased time. Occasional verbal cuing provided for safety awareness and sequencing. Pt able to ambulate 250' before taking seated rest break. Frequent falls reported at baseline however no LOB experienced during session; mild B footdrop and postural sway demonstrated. HR and SpO2 remained WFL on RA  throughout. The patient's AM-[facility] Basic Mobility Inpatient Short Form Raw Score is 20. A Raw score of greater than 16 suggests the patient may benefit from discharge to home. Please also refer to the recommendation of the Physical Therapist for safe discharge planning. Co treatment with OT secondary to complex medical condition of pt, possible A of 2 required to achieve and maintain transitional movements, requiring the need of skilled therapeutic intervention of 2 therapists to achieve delivery of services. Pt will benefit from continued PT intervention during LOS to address current deficits, increase LOF, and facilitate safe d/c to next level of care when medically appropriate. D/c recommendation at this time is rehabilitation resource intensity level III.   Goals   Patient Goals to go home   LTG Expiration Date 11/20/24   Long Term Goal #1 Pt will participate in B LE strengthening exercises to facilitate improved functional activity tolerance. Pt will perform all functional transfers and bed mobility mod(I) with good safety awareness. Pt will ambulate 450' mod(I) with LRAD while maintaining good functional dynamic balance.   Plan   Treatment/Interventions Functional transfer training;LE strengthening/ROM;Endurance training;Therapeutic exercise;Bed mobility;Gait training   PT Frequency 3-5x/wk   Discharge Recommendation   Rehab Resource Intensity Level, PT III (Minimum Resource Intensity)   -PAC Basic Mobility Inpatient   Turning in Flat Bed Without Bedrails 4   Lying on Back to Sitting on Edge of Flat Bed Without Bedrails 4   Moving Bed to Chair 3   Standing Up From Chair Using Arms 3   Walk in Room 3   Climb 3-5 Stairs With Railing 3   Basic Mobility Inpatient Raw Score 20   Basic Mobility Standardized Score 43.99   [facility] Highest Level Of Mobility   -HLM Goal 6: Walk 10 steps or more   -HLM Achieved 8: Walk 250 feet ot more   End of Consult   Patient Position at End of Consult Bedside chair;All needs  within reach

## 2024-11-06 NOTE — PLAN OF CARE
Problem: OCCUPATIONAL THERAPY ADULT  Goal: Performs self-care activities at highest level of function for planned discharge setting.  See evaluation for individualized goals.  Description: Treatment Interventions: ADL retraining, Functional transfer training, UE strengthening/ROM, Endurance training, Patient/family training, Equipment evaluation/education, Activityengagement          See flowsheet documentation for full assessment, interventions and recommendations.   Note: Limitation: Decreased ADL status, Decreased UE strength, Decreased Safe judgement during ADL, Decreased endurance, Decreased self-care trans, Decreased high-level ADLs     Assessment: Pt is a 54 y.o. male seen for OT evaluation s/p admit to [facility] on 11/4/2024 w/ Acute on chronic systolic heart failure (HCC).  Comorbidities affecting pt's functional performance at time of assessment include:  DM, seizure disorder, HLD, orthostatic hypotension, depression, GERD, CHF, CKD . Personal factors affecting pt at time of IE include:difficulty performing ADLS, difficulty performing IADLS , flat affect, decreased initiation and engagement , and health management . Prior to admission, pt was (I) with ADLs and IADLs with use of SPC or RW during mobility. Upon evaluation: Pt requires (S) level with RW during mobility 2* the following deficits impacting occupational performance: weakness, decreased strength, decreased balance, decreased tolerance, impaired initiation, decreased safety awareness, and increased pain. Pt to benefit from continued skilled OT tx while in the hospital to address deficits as defined above and maximize level of functional independence w ADL's and functional mobility. Occupational Performance areas to address include: grooming, bathing/shower, toilet hygiene, dressing, functional mobility, community mobility, and clothing management. The patient's raw score on the AM-PAC Daily Activity Inpatient Short Form is 21. A raw score of greater  than or equal to 19 suggests the patient may benefit from discharge to home. Discharge recommendation at this time is level III minimum resource intensity.  Pt benefited from co-evaluation of skilled OT and PT therapists in order to most appropriately address functional deficits d/t extensive assistance required for safe functional mobility, decreased activity tolerance, and regression from functioning level prior to admission and/or onset of present illness. OT/PT objectives were addressed separately; please see PT note for specific goal areas targeted.     Rehab Resource Intensity Level, OT: III (Minimum Resource Intensity)

## 2024-11-06 NOTE — PLAN OF CARE
Problem: PHYSICAL THERAPY ADULT  Goal: Performs mobility at highest level of function for planned discharge setting.  See evaluation for individualized goals.  Description: Treatment/Interventions: Functional transfer training, LE strengthening/ROM, Endurance training, Therapeutic exercise, Bed mobility, Gait training          See flowsheet documentation for full assessment, interventions and recommendations.  Note: Prognosis: Good  Problem List: Decreased strength, Decreased endurance, Impaired balance, Decreased mobility, Pain  Assessment: Patient is a 54 y.o. male evaluated by Physical Therapy s/p admit to [facility] on 11/4/2024 with admitting diagnosis of: Anasarca, Abnormal blood findings, Acute exacerbation of CHF (congestive heart failure), Elevated brain natriuretic peptide (BNP) level, and principal problem of: Acute on chronic systolic heart failure. PT was consulted to assess patient's functional mobility and discharge needs. Ordered are PT Evaluation and treatment with activity level of: up as tolerated. Comorbidities affecting patient's physical performance at time of assessment include:  DM, seizure disorder, mixed HLD, orthostatic hypotension, GERD, CKD, PTSD, h/o splenectomy, SERGEI . Personal factors affecting the patient at time of IE include: lives in 2 story home, ambulating with assistive device, history of fall(s), inability/difficulty performing IADLs, and inability/difficulty performing ADLs. Please locate objective findings from PT assessment regarding body systems outlined above. Upon evaluation, pt able to perform all functional mobility with SUP, RW, and increased time. Occasional verbal cuing provided for safety awareness and sequencing. Pt able to ambulate 250' before taking seated rest break. Frequent falls reported at baseline however no LOB experienced during session; mild B footdrop and postural sway demonstrated. HR and SpO2 remained WFL on RA throughout. The  patient's AM-PAC Basic Mobility Inpatient Short Form Raw Score is 20. A Raw score of greater than 16 suggests the patient may benefit from discharge to home. Please also refer to the recommendation of the Physical Therapist for safe discharge planning. Co treatment with OT secondary to complex medical condition of pt, possible A of 2 required to achieve and maintain transitional movements, requiring the need of skilled therapeutic intervention of 2 therapists to achieve delivery of services. Pt will benefit from continued PT intervention during LOS to address current deficits, increase LOF, and facilitate safe d/c to next level of care when medically appropriate. D/c recommendation at this time is rehabilitation resource intensity level III.        Rehab Resource Intensity Level, PT: III (Minimum Resource Intensity)    See flowsheet documentation for full assessment.

## 2024-11-06 NOTE — CASE MANAGEMENT
Case Management Discharge Planning Note    Patient name Ryan Farnsworth  Location /418-01 MRN [id number]  : 1970 Date 2024       Current Admission Date: 2024  Current Admission Diagnosis:Acute on chronic systolic heart failure (HCC)   Patient Active Problem List    Diagnosis Date Noted Date Diagnosed    H/O splenectomy 2024     Obstructive sleep apnea syndrome 2024     Acute on chronic systolic heart failure (HCC) 2024     Stage 3a chronic kidney disease (HCC) 2024     GERD (gastroesophageal reflux disease) 2023     Alcohol intake above recommended sensible limits 2023     Arteriosclerosis of coronary artery 2023     Mixed hyperlipidemia 2022     Orthostatic hypotension 2022     Diabetic ulcer of toe of right foot associated with type 2 diabetes mellitus ([facility]) 2020     Seizure disorder ([facility]) 2020     PTSD (post-traumatic stress disorder) 2020     Gastrointestinal hemorrhage with hematemesis 2019     Alcoholic ketoacidosis 2019     Type 2 diabetes mellitus with diabetic polyneuropathy, without long-term current use of insulin ([facility]) 2019     Chronic alcohol abuse 2019     SUSIE (acute kidney injury) ([facility]) 2019     Anxiety 2017     Depression 2017     History of seizure 2017     Iron deficiency anemia 2017       LOS (days): 2  Geometric Mean LOS (GMLOS) (days): 3.9  Days to GMLOS:2     OBJECTIVE:  Risk of Unplanned Readmission Score: 13.55         Current admission status: Inpatient   Preferred Pharmacy:   [facility]  [address]  [place] [address]  Phone: [phone] Fax: [phone]    [facility]/pharmacy #[id number] - [place], [place] - [address]  [address]  [place] [address]  Phone: [phone] Fax: [phone]    Primary Care Provider: Guerrero Campa MD    Primary Insurance: MEDICARE  Secondary Insurance: [facility]  [employer]    DISCHARGE DETAILS:     Patient to be transferred to Critical access hospital level of care for Cardiac consult.  Pac[facility] to arrange transport.

## 2024-11-07 ENCOUNTER — PATIENT OUTREACH (OUTPATIENT)
Dept: CARDIOLOGY CLINIC | Facility: CLINIC | Age: 54
End: 2024-11-07

## 2024-11-07 PROBLEM — E83.42 HYPOMAGNESEMIA: Status: ACTIVE | Noted: 2024-11-07

## 2024-11-07 PROBLEM — K59.1 FUNCTIONAL DIARRHEA: Status: ACTIVE | Noted: 2024-11-07

## 2024-11-07 LAB
ANION GAP SERPL CALCULATED.3IONS-SCNC: 10 MMOL/L (ref 4–13)
BUN SERPL-MCNC: 27 MG/DL (ref 5–25)
CALCIUM SERPL-MCNC: 9.6 MG/DL (ref 8.4–10.2)
CHLORIDE SERPL-SCNC: 105 MMOL/L (ref 96–108)
CO2 SERPL-SCNC: 26 MMOL/L (ref 21–32)
CREAT SERPL-MCNC: 1.39 MG/DL (ref 0.6–1.3)
ERYTHROCYTE [DISTWIDTH] IN BLOOD BY AUTOMATED COUNT: 16.9 % (ref 11.6–15.1)
GFR SERPL CREATININE-BSD FRML MDRD: 56 ML/MIN/1.73SQ M
GLUCOSE SERPL-MCNC: 112 MG/DL (ref 65–140)
GLUCOSE SERPL-MCNC: 118 MG/DL (ref 65–140)
GLUCOSE SERPL-MCNC: 222 MG/DL (ref 65–140)
GLUCOSE SERPL-MCNC: 78 MG/DL (ref 65–140)
GLUCOSE SERPL-MCNC: 94 MG/DL (ref 65–140)
HCT VFR BLD AUTO: 45.4 % (ref 36.5–49.3)
HGB BLD-MCNC: 14.5 G/DL (ref 12–17)
MAGNESIUM SERPL-MCNC: 1.1 MG/DL (ref 1.9–2.7)
MCH RBC QN AUTO: 29.7 PG (ref 26.8–34.3)
MCHC RBC AUTO-ENTMCNC: 31.9 G/DL (ref 31.4–37.4)
MCV RBC AUTO: 93 FL (ref 82–98)
PLATELET # BLD AUTO: 229 THOUSANDS/UL (ref 149–390)
PMV BLD AUTO: 11.1 FL (ref 8.9–12.7)
POTASSIUM SERPL-SCNC: 4.6 MMOL/L (ref 3.5–5.3)
RBC # BLD AUTO: 4.89 MILLION/UL (ref 3.88–5.62)
SODIUM SERPL-SCNC: 141 MMOL/L (ref 135–147)
WBC # BLD AUTO: 6.3 THOUSAND/UL (ref 4.31–10.16)

## 2024-11-07 PROCEDURE — 82948 REAGENT STRIP/BLOOD GLUCOSE: CPT

## 2024-11-07 PROCEDURE — 97163 PT EVAL HIGH COMPLEX 45 MIN: CPT

## 2024-11-07 PROCEDURE — 80048 BASIC METABOLIC PNL TOTAL CA: CPT | Performed by: INTERNAL MEDICINE

## 2024-11-07 PROCEDURE — B2111ZZ FLUOROSCOPY OF MULTIPLE CORONARY ARTERIES USING LOW OSMOLAR CONTRAST: ICD-10-PCS | Performed by: INTERNAL MEDICINE

## 2024-11-07 PROCEDURE — 99153 MOD SED SAME PHYS/QHP EA: CPT | Performed by: INTERNAL MEDICINE

## 2024-11-07 PROCEDURE — 99152 MOD SED SAME PHYS/QHP 5/>YRS: CPT | Performed by: INTERNAL MEDICINE

## 2024-11-07 PROCEDURE — 4A023N7 MEASUREMENT OF CARDIAC SAMPLING AND PRESSURE, LEFT HEART, PERCUTANEOUS APPROACH: ICD-10-PCS | Performed by: INTERNAL MEDICINE

## 2024-11-07 PROCEDURE — 99223 1ST HOSP IP/OBS HIGH 75: CPT | Performed by: INTERNAL MEDICINE

## 2024-11-07 PROCEDURE — 93458 L HRT ARTERY/VENTRICLE ANGIO: CPT | Performed by: INTERNAL MEDICINE

## 2024-11-07 PROCEDURE — C1894 INTRO/SHEATH, NON-LASER: HCPCS | Performed by: INTERNAL MEDICINE

## 2024-11-07 PROCEDURE — 85027 COMPLETE CBC AUTOMATED: CPT | Performed by: INTERNAL MEDICINE

## 2024-11-07 PROCEDURE — 99232 SBSQ HOSP IP/OBS MODERATE 35: CPT | Performed by: NURSE PRACTITIONER

## 2024-11-07 PROCEDURE — C1769 GUIDE WIRE: HCPCS | Performed by: INTERNAL MEDICINE

## 2024-11-07 PROCEDURE — C1887 CATHETER, GUIDING: HCPCS | Performed by: INTERNAL MEDICINE

## 2024-11-07 PROCEDURE — 97167 OT EVAL HIGH COMPLEX 60 MIN: CPT

## 2024-11-07 PROCEDURE — 83735 ASSAY OF MAGNESIUM: CPT | Performed by: INTERNAL MEDICINE

## 2024-11-07 RX ORDER — ASPIRIN 81 MG/1
81 TABLET, CHEWABLE ORAL DAILY
Status: COMPLETED | OUTPATIENT
Start: 2024-11-08 | End: 2024-11-08

## 2024-11-07 RX ORDER — MIDAZOLAM HYDROCHLORIDE 2 MG/2ML
INJECTION, SOLUTION INTRAMUSCULAR; INTRAVENOUS CODE/TRAUMA/SEDATION MEDICATION
Status: DISCONTINUED | OUTPATIENT
Start: 2024-11-07 | End: 2024-11-07 | Stop reason: HOSPADM

## 2024-11-07 RX ORDER — VERAPAMIL HYDROCHLORIDE 2.5 MG/ML
INJECTION, SOLUTION INTRAVENOUS CODE/TRAUMA/SEDATION MEDICATION
Status: DISCONTINUED | OUTPATIENT
Start: 2024-11-07 | End: 2024-11-07 | Stop reason: HOSPADM

## 2024-11-07 RX ORDER — ASPIRIN 81 MG/1
TABLET, CHEWABLE ORAL CODE/TRAUMA/SEDATION MEDICATION
Status: DISCONTINUED | OUTPATIENT
Start: 2024-11-07 | End: 2024-11-07 | Stop reason: HOSPADM

## 2024-11-07 RX ORDER — FUROSEMIDE 20 MG/1
20 TABLET ORAL DAILY
Status: DISCONTINUED | OUTPATIENT
Start: 2024-11-08 | End: 2024-11-09

## 2024-11-07 RX ORDER — LIDOCAINE HYDROCHLORIDE 10 MG/ML
INJECTION, SOLUTION EPIDURAL; INFILTRATION; INTRACAUDAL; PERINEURAL CODE/TRAUMA/SEDATION MEDICATION
Status: DISCONTINUED | OUTPATIENT
Start: 2024-11-07 | End: 2024-11-07 | Stop reason: HOSPADM

## 2024-11-07 RX ORDER — LOPERAMIDE HYDROCHLORIDE 2 MG/1
2 CAPSULE ORAL 3 TIMES DAILY PRN
Status: DISCONTINUED | OUTPATIENT
Start: 2024-11-07 | End: 2024-11-09 | Stop reason: HOSPADM

## 2024-11-07 RX ORDER — SODIUM CHLORIDE 9 MG/ML
50 INJECTION, SOLUTION INTRAVENOUS CONTINUOUS
Status: DISPENSED | OUTPATIENT
Start: 2024-11-07 | End: 2024-11-07

## 2024-11-07 RX ORDER — FENTANYL CITRATE 50 UG/ML
INJECTION, SOLUTION INTRAMUSCULAR; INTRAVENOUS CODE/TRAUMA/SEDATION MEDICATION
Status: DISCONTINUED | OUTPATIENT
Start: 2024-11-07 | End: 2024-11-07 | Stop reason: HOSPADM

## 2024-11-07 RX ORDER — MAGNESIUM SULFATE HEPTAHYDRATE 40 MG/ML
4 INJECTION, SOLUTION INTRAVENOUS ONCE
Status: COMPLETED | OUTPATIENT
Start: 2024-11-07 | End: 2024-11-07

## 2024-11-07 RX ORDER — METOPROLOL SUCCINATE 25 MG/1
12.5 TABLET, EXTENDED RELEASE ORAL 2 TIMES DAILY
Status: DISCONTINUED | OUTPATIENT
Start: 2024-11-07 | End: 2024-11-09 | Stop reason: HOSPADM

## 2024-11-07 RX ORDER — NITROGLYCERIN 20 MG/100ML
INJECTION INTRAVENOUS CODE/TRAUMA/SEDATION MEDICATION
Status: DISCONTINUED | OUTPATIENT
Start: 2024-11-07 | End: 2024-11-07 | Stop reason: HOSPADM

## 2024-11-07 RX ORDER — SACCHAROMYCES BOULARDII 250 MG
250 CAPSULE ORAL 2 TIMES DAILY
Status: DISCONTINUED | OUTPATIENT
Start: 2024-11-07 | End: 2024-11-09 | Stop reason: HOSPADM

## 2024-11-07 RX ORDER — EZETIMIBE 10 MG/1
10 TABLET ORAL
Status: DISCONTINUED | OUTPATIENT
Start: 2024-11-07 | End: 2024-11-09 | Stop reason: HOSPADM

## 2024-11-07 RX ORDER — HEPARIN SODIUM 1000 [USP'U]/ML
INJECTION, SOLUTION INTRAVENOUS; SUBCUTANEOUS CODE/TRAUMA/SEDATION MEDICATION
Status: DISCONTINUED | OUTPATIENT
Start: 2024-11-07 | End: 2024-11-07 | Stop reason: HOSPADM

## 2024-11-07 RX ADMIN — MIRTAZAPINE 30 MG: 30 TABLET, FILM COATED ORAL at 21:43

## 2024-11-07 RX ADMIN — PANTOPRAZOLE SODIUM 20 MG: 20 TABLET, DELAYED RELEASE ORAL at 05:31

## 2024-11-07 RX ADMIN — BUSPIRONE HYDROCHLORIDE 30 MG: 10 TABLET ORAL at 08:44

## 2024-11-07 RX ADMIN — LEVETIRACETAM 500 MG: 500 TABLET, FILM COATED ORAL at 21:43

## 2024-11-07 RX ADMIN — HEPARIN SODIUM 5000 UNITS: 5000 INJECTION, SOLUTION INTRAVENOUS; SUBCUTANEOUS at 05:31

## 2024-11-07 RX ADMIN — Medication 6 MG: at 21:43

## 2024-11-07 RX ADMIN — Medication 12.5 MG: at 08:44

## 2024-11-07 RX ADMIN — Medication 1 TABLET: at 08:44

## 2024-11-07 RX ADMIN — THIAMINE HCL TAB 100 MG 100 MG: 100 TAB at 08:43

## 2024-11-07 RX ADMIN — METOPROLOL SUCCINATE 12.5 MG: 25 TABLET, EXTENDED RELEASE ORAL at 21:43

## 2024-11-07 RX ADMIN — LAMOTRIGINE 100 MG: 100 TABLET ORAL at 08:44

## 2024-11-07 RX ADMIN — LEVETIRACETAM 500 MG: 500 TABLET, FILM COATED ORAL at 08:44

## 2024-11-07 RX ADMIN — BUSPIRONE HYDROCHLORIDE 30 MG: 10 TABLET ORAL at 21:43

## 2024-11-07 RX ADMIN — HEPARIN SODIUM 5000 UNITS: 5000 INJECTION, SOLUTION INTRAVENOUS; SUBCUTANEOUS at 21:43

## 2024-11-07 RX ADMIN — Medication 250 MG: at 11:47

## 2024-11-07 RX ADMIN — SODIUM CHLORIDE 50 ML/HR: 0.9 INJECTION, SOLUTION INTRAVENOUS at 14:52

## 2024-11-07 RX ADMIN — INSULIN LISPRO 2 UNITS: 100 INJECTION, SOLUTION INTRAVENOUS; SUBCUTANEOUS at 21:43

## 2024-11-07 RX ADMIN — SPIRONOLACTONE 50 MG: 50 TABLET, FILM COATED ORAL at 08:44

## 2024-11-07 RX ADMIN — MIDODRINE HYDROCHLORIDE 10 MG: 5 TABLET ORAL at 21:43

## 2024-11-07 RX ADMIN — MAGNESIUM SULFATE HEPTAHYDRATE 4 G: 40 INJECTION, SOLUTION INTRAVENOUS at 08:44

## 2024-11-07 RX ADMIN — Medication 250 MG: at 18:20

## 2024-11-07 RX ADMIN — MIDODRINE HYDROCHLORIDE 10 MG: 5 TABLET ORAL at 08:44

## 2024-11-07 RX ADMIN — FUROSEMIDE 40 MG: 40 TABLET ORAL at 08:44

## 2024-11-07 RX ADMIN — EZETIMIBE 10 MG: 10 TABLET ORAL at 21:43

## 2024-11-07 NOTE — ASSESSMENT & PLAN NOTE
Initially admitted to the [facility] with acute on chronic CHF, as evidenced by lower extremity edema and shortness of breath  Status post IV diuresis    Since been transition back to p.o. furosemide 40 mg daily  GDMT: Has had difficulties in the past tolerating goal-directed medical therapy secondary to orthostatic hypotension  Continue spironolactone 50 mg daily  Continue metoprolol 12.5 mg twice daily  Monitor vital signs closely and for signs or symptoms of orthostatic hypotension  Transferred to here for ischemic workup given drop in EF on repeat echocardiogram

## 2024-11-07 NOTE — PROGRESS NOTES
Patient listed on Advanced Heart Failure Census at [facility].     Outpatient Advanced Heart Failure LCSW completed electronic chart review and rounded with the HF Team. Patient stated he's been NPO since dinner last night. Plan heart cath today.   Pt reported to HF Team that he has hx of alcohol consumption from 11am-2am but no heavy drinking recently.   Please refer to Inpt CM Assessment by Henrietta Block RN dated 11/5/24.  Also refer to OP CM note by Jacquelyn Garrison RN dated 11/6/24.    Referral for outpatient social work not entered at this time.   Please enter referral if outpatient resources are needed in the future.

## 2024-11-07 NOTE — PHYSICAL THERAPY NOTE
PHYSICAL THERAPY EVALUATION  NAME:  Ryan Farnsworth  DATE: 11/07/24    AGE:   54 y.o.  Mrn:   [id number]  ADMIT DX:  CHF (congestive heart failure) (HCC) [I50.9]    Past Medical History:   Diagnosis Date    Alcoholic hepatitis     Anxiety     Chronic liver disease     Depression     Diabetes (HCC)     Diabetes mellitus (HCC)     Diabetic ulcer of toe of right foot associated with type 2 diabetes mellitus (HCC) 06/27/2020    Diabetic ulcer of toe of right foot associated with type 2 diabetes mellitus, with fat layer exposed (HCC) 06/27/2020    Hypertension     Iron deficiency anemia     Lactic acidosis 06/27/2020    Pancreatitis     PTSD (post-traumatic stress disorder)     Seizure (HCC)     Seizure disorder (HCC) 06/27/2020    Seizures (HCC)     Sepsis, unspecified organism (HCC) 06/27/2020    Type 2 diabetes mellitus with diabetic polyneuropathy, without long-term current use of insulin (HCC) 03/25/2019     Past Surgical History:   Procedure Laterality Date    ABDOMINAL SURGERY      CARDIAC CATHETERIZATION      PANCREATECTOMY      SPLENECTOMY      TONSILLECTOMY         Length Of Stay: 1  PHYSICAL THERAPY EVALUATION :    11/07/24 1051   PT Last Visit   PT Visit Date 11/07/24   Note Type   Note type Evaluation   End of Consult   Patient Position at End of Consult Bedside chair;All needs within reach;Bed/Chair alarm activated   Pain Assessment   Pain Assessment Tool 0-10   Pain Score No Pain   Restrictions/Precautions   Weight Bearing Precautions Per Order No   Braces or Orthoses   (none)   Other Precautions Impulsive;Chair Alarm;Bed Alarm;Telemetry;Fall Risk  (severe polyneuropathy in B/L hands and feet; pt reports b/ foot drop and does have DF weakness noted but able to clear feet during gait on eval)   Home Living   Type of Home House   Home Layout Multi-level;Other (Comment)  (has stair glid to enter and stair glide b/t floors)   Bathroom Shower/Tub Walk-in shower   Bathroom Toilet Raised   Bathroom  Equipment Grab bars around toilet;Grab bars in shower   Bathroom Accessibility Accessible   Home Equipment Walker;Cane;Wheelchair-manual  (rollator)   Additional Comments Pt lives in a 4 SH with stairglide to enter and between all floors, pt uses [employer] + furniture walks in home- pt reports he is HOMEBOUND and has [facility] services come to him in his home- uses [employer] and has grocery delivery.   Prior Function   Level of [place] Independent with ADLs;Independent with functional mobility;Independent with IADLS  (I with some IADL, assist for some IADL)   Lives With Alone   Receives Help From Friend(s);Home health  ([facility] comes 1x per [facility] for pill box, visiting nurses)   IADLs Family/Friend/Other provides meals;Family/Friend/Other provides medication management  (easy meals/[employer])   Falls in the last 6 months 5 to 10  (reports 9 falls)   Vocational On disability   Cognition   Overall Cognitive Status WFL  (although with some decreased insight)   Attention Attends with cues to redirect   Orientation Level Oriented X4   Memory Decreased recall of precautions   Following Commands Follows one step commands without difficulty   Comments cooperative; limited insight- required cues and education for safety   RUE Assessment   RUE Assessment WFL   LUE Assessment   LUE Assessment WFL   RLE Assessment   RLE Assessment X   Strength RLE   R Hip Flexion 4/5   R Knee Flexion 4/5   R Knee Extension 4/5   R Ankle Dorsiflexion 3/5   LLE Assessment   LLE Assessment X   Strength LLE   L Hip Flexion 4/5   L Knee Flexion 4/5   L Knee Extension 4/5   L Ankle Dorsiflexion 2+/5   Light Touch   RLE Light Touch Absent   RLE Light Touch Comments B/L feet and ankles   LLE Light Touch Absent   LLE Light Touch Comments B/L feet and ankles   Bed Mobility   Supine to Sit 5  Supervision   Additional items Impulsive   Sit to Supine 5  Supervision   Additional items Verbal cues   Transfers   Sit to Stand 5  Supervision   Additional items Increased  time required;Verbal cues   Stand to Sit 5  Supervision   Additional items Impulsive;Verbal cues   Ambulation/Elevation   Gait pattern Inconsistent melecio;Decreased foot clearance;Improper Weight shift   Gait Assistance 5  Supervision  (CGA)   Additional items Verbal cues;Tactile cues   Assistive Device Rolling walker;Straight cane   Distance S> CGA w/ turn for ambulation w/ RW 90+70' w/ standing rest break.   Stair Management Assistance Not tested   Balance   Static Sitting Fair +   Dynamic Sitting Fair   Static Standing Fair -   Dynamic Standing Fair -   Ambulatory Poor +   Endurance Deficit   Endurance Deficit Yes   Endurance Deficit Description fatigue and mild LOB/ unsteady w/ increased distances   Activity Tolerance   Activity Tolerance Patient limited by fatigue   Medical Staff Made Aware OT RN sommer CM for d/c planning   Nurse Made Aware yes   Assessment Pt is 54 y.o. male seen for PT evaluation s/p admit to [facility]  as a transfer from [facility] where he presents initially w/ QUINONES and LE swelling. Pt  dx w/ Cardiomyopathy (HCC). Pt w/  drop in EF to 25%, from 40 to 45% prior.  Pt underwent L heart catheterization. PT consulted to assist w/ mobility and d/c planning.      Pt  has a past medical history of Alcoholic hepatitis, Anxiety, Chronic liver disease, Depression, Diabetes ([facility]), Diabetes mellitus ([facility]), Diabetic ulcer of toe of right foot associated with type 2 diabetes mellitus ([facility]) (06/27/2020), Diabetic ulcer of toe of right foot associated with type 2 diabetes mellitus, with fat layer exposed ([facility]) (06/27/2020), Hypertension, Iron deficiency anemia, Lactic acidosis (06/27/2020), Pancreatitis, PTSD (post-traumatic stress disorder), Seizure ([facility]), Seizure disorder (HCC) (06/27/2020), Seizures ([facility]), Sepsis, unspecified organism ([facility]) (06/27/2020), and Type 2 diabetes mellitus with diabetic polyneuropathy, without long-term current use of insulin ([facility]) (03/25/2019).  Due to acute medical  issues, ongoing medical workup for primary dx; pain, fall risk, increased reliance on more restrictive AD compared to baseline;  decreased activity tolerance compared to baseline, increased assistance needed from caregiver at current time, continuous monitoring, trending labs;  multiple lines, decline in overall functional mobility status; health management issues; note unstable clinical picture (high complexity).      At baseline, pt reports being homebound; not leaving his multilevel home and having [facility] and RN services come into his home. Pt uses [employer] in home + furniture walks. 0 drive; has 9-10 falls recently. I w/ ADls. Refusing rehab- wants to only go home.     Currently,  pt  is requiring S A for bed skills; S for functional transfers and S> CGA w/ turn for ambulation w/ RW 90+70' w/ standing rest break. Pt on RA. Was educated on safety and recommended to use RW 2* hx of severe polyneuropathy in feet/ hands w/ hx of multiple falls.       Pt presents functioning below baseline and currently w/ overall mobility deficits 2* to: decreased LE strength/AROM; severely impaired sensation in feet and hands. generalized weakness/ deconditioning; decreased endurance; decreased activity tolerance; decreased coordination; impaired balance; gait deviations; decreased safety awareness; SOB/QUINONES; fatigue; impaired safety and judgement; limited insight into current deficits; bed/ chair alarms; multiple lines;  Pt currently at risk for falls.  (Please find additional objective findings from PT assessment regarding body systems outlined above.) Pt will continue to benefit from skilled PT interventions to address stated impairments; to maximize functional potential; for ongoing pt/ family training; and DME needs.  PT is recommending PT Resource Intensity Level  3 (home PT + RW use  on d/c.      PT will follow for STG as outlined on eval. Pt/ family agreeable to PT POC and goals as stated.    Prognosis Good   Problem List Decreased  strength;Decreased range of motion;Decreased endurance;Impaired balance;Decreased mobility;Decreased coordination;Decreased cognition;Impaired judgement;Decreased safety awareness;Impaired sensation;Pain;Decreased skin integrity   Goals   Patient Goals to go home   STG Expiration Date 11/21/24   Short Term Goal #1 In 14 days pt will:  Complete bed mobility skills with MI/ indep to facilitate safe return to previous living environment and decrease burden on caregivers.  Perform all functional transfers with MI/ indep  to facilitate safe return to previous living environment.    Ambulate  with RW vs rollator >200'x2' without LOB and stable vitals for safe ambulation in home/ community environment.    Improve balance by 1 grade in order to decrease fall risk.    Improve LE strength grades by 1 to increase independence w/ all functional mobility, transfers and gait.  Actively participate w/ PT for ongoing pt and family education; DME needs and D/C planning to promote highest level of function in least restrictive environment.   PT Treatment Day 0   Plan   Treatment/Interventions ADL retraining;Functional transfer training;LE strengthening/ROM;Elevations;Therapeutic exercise;Endurance training;Cognitive reorientation;Patient/family training;Equipment eval/education;Bed mobility;Gait training;Compensatory technique education;Spoke to nursing;Spoke to case management;Spoke to advanced practitioner;OT   PT Frequency 3-5x/wk   Discharge Recommendation   Rehab Resource Intensity Level, PT III (Minimum Resource Intensity)   AM-PAC Basic Mobility Inpatient   Turning in Flat Bed Without Bedrails 4   Lying on Back to Sitting on Edge of Flat Bed Without Bedrails 4   Moving Bed to Chair 3   Standing Up From Chair Using Arms 3   Walk in Room 3   Climb 3-5 Stairs With Railing 1   Basic Mobility Inpatient Raw Score 18   Basic Mobility Standardized Score 41.05   Pito Offerle Highest Level Of Mobility   -[facility] Goal 6: Walk 10 steps or  more   -[facility] Achieved 7: Walk 25 feet or more   End of Consult   Patient Position at End of Consult Bedside chair;Bed/Chair alarm activated;All needs within reach     The patient's AM-PAC Basic Mobility Inpatient Short Form Raw Score is 18. A Raw score of greater than 16 suggests the patient may benefit from discharge to home. Please also refer to the recommendation of the Physical Therapist for safe discharge planning.

## 2024-11-07 NOTE — CASE MANAGEMENT
Case Management Assessment & Discharge Planning Note    Patient name Ryan Farnsworth  Location [facility]01 MRN [id number]  : 1970 Date 2024       Current Admission Date: 2024  Current Admission Diagnosis:Cardiomyopathy (HCC)   Patient Active Problem List    Diagnosis Date Noted Date Diagnosed    Functional diarrhea 2024     Hypomagnesemia 2024     Cardiomyopathy (HCC) 2024     H/O splenectomy 2024     Obstructive sleep apnea syndrome 2024     Acute on chronic systolic heart failure (HCC) 2024     Stage 3a chronic kidney disease ([facility]) 2024     GERD (gastroesophageal reflux disease) 2023     Alcohol intake above recommended sensible limits 2023     Arteriosclerosis of coronary artery 2023     Mixed hyperlipidemia 2022     Orthostatic hypotension 2022     Diabetic ulcer of toe of right foot associated with type 2 diabetes mellitus ([facility]) 2020     Seizure disorder ([facility]) 2020     PTSD (post-traumatic stress disorder) 2020     Gastrointestinal hemorrhage with hematemesis 2019     Alcoholic ketoacidosis 2019     Type 2 diabetes mellitus with diabetic polyneuropathy, without long-term current use of insulin ([facility]) 2019     Chronic alcohol abuse 2019     SUSIE (acute kidney injury) ([facility]) 2019     Anxiety 2017     Depression 2017     History of seizure 2017     Iron deficiency anemia 2017       LOS (days): 1  Geometric Mean LOS (GMLOS) (days): 2.7  Days to GMLOS:1.9     OBJECTIVE:    Risk of Unplanned Readmission Score: 17.32         Current admission status: Inpatient       Preferred Pharmacy:   [facility]  [address]  [place] [address]  Phone: [phone] Fax: [phone]    [id number] - [place] - [address]  [place] [address]  Phone: [phone] Fax:  [phone]    Primary Care Provider: Guerrero Campa MD    Primary Insurance: [facility]  Secondary Insurance: MEDICARE    ASSESSMENT:  Active Health Care Proxies    There are no active Health Care Proxies on file.                 Readmission Root Cause  30 Day Readmission: No    Patient Information  Admitted from:: Home  Mental Status: Alert  During Assessment patient was accompanied by: Not accompanied during assessment  Assessment information provided by:: Patient  Support Systems: Self, Family members  County of Residence: [place]  What city do you live in?: [place]  Home entry access options. Select all that apply.: Other access (Comment) (2 stairlifts for outside steps)  Type of Current Residence: [address] home  Upon entering residence, is there a bedroom on the main floor (no further steps)?: Yes  Upon entering residence, is there a bathroom on the main floor (no further steps)?: Yes  Living Arrangements: Lives Alone  Is patient a ?: Yes  Is patient active with [facility] ( [facility] [facility])?: Yes  Is patient service connected?: Yes (100%)    Activities of Daily Living Prior to Admission  Functional Status: Independent  Completes ADLs independently?: Yes  Ambulates independently?: Yes  Does patient use assisted devices?: Yes  Assisted Devices (DME) used: Stair Chair/Glide, Straight Cane, Wheelchair, Other (Comment), Rollator (grab bars, shower bench, incontinence supplies)  Does patient currently own DME?: Yes  What DME does the patient currently own?: Wheelchair, Stair Chair/Glide, Straight Cane, Rollator, Other (Comment) (grab bars, shower bench, incontinence supplies)  Does the patient have a history of Short-Term Rehab?: Yes ([facility])  Does patient have a history of HHC?: Yes ([facility])  Does patient currently have HHC?: Yes (doctors do home visits)    Current Home Health Care  Type of Current Home Care Services: Other (Comment) (doctor visits)    Patient Information  Continued  Income Source: SSI/SSD  Does patient have prescription coverage?: Yes  Does patient receive dialysis treatments?: No  Does patient have a history of substance abuse?: Yes  Historical substance use preference: Alcohol/ETOH  Is patient currently in treatment for substance abuse?: No. Patient declined treatment information. (only drinks about 4 beers a week)  Does patient have a history of Mental Health Diagnosis?: Yes (PTSD, anxiety, depression)  Is patient receiving treatment for mental health?: Yes (telemed)  Has patient received inpatient treatment related to mental health in the last 2 years?: No                Social Determinants of Health (SDOH)      Flowsheet Row Most Recent Value   Housing Stability    In the last 12 months, was there a time when you were not able to pay the mortgage or rent on time? N   In the past 12 months, how many times have you moved where you were living? 0   At any time in the past 12 months, were you homeless or living in a shelter (including now)? N   Transportation Needs    In the past 12 months, has lack of transportation kept you from medical appointments or from getting medications? no   In the past 12 months, has lack of transportation kept you from meetings, work, or from getting things needed for daily living? No   Food Insecurity    Within the past 12 months, you worried that your food would run out before you got the money to buy more. Never true   Within the past 12 months, the food you bought just didn't last and you didn't have money to get more. Never true   Utilities    In the past 12 months has the electric, gas, oil, or water company threatened to shut off services in your home? No            DISCHARGE DETAILS:    Discharge planning discussed with:: patient        CM contacted family/caregiver?: No- see comments (declined)               Would you like to participate in our [facility] service program?  : No - Declined- needs to go through [facility]                   Additional Comments: Patient states he is mainly homebound due to PTSD and his medical situation.  Patient states the [facility] has coordinated services in his home and through telemed.  [facility]  [place] [phone] has been arranging things with [facility] so he can get the care he needs.  CM to be available        CM reviewed d/c planning process including the following: identifying help at home, patient preference for d/c planning needs, Discharge Lounge, [facility] Med[facility] to Bed program, availability of treatment team to discuss questions or concerns patient and/or family may have regarding understanding medications and recognizing signs and symptoms once discharged.  CM also encouraged patient to follow up with all recommended appointments after discharge. Patient advised of importance for patient and family to participate in managing patient’s medical well being.

## 2024-11-07 NOTE — UTILIZATION REVIEW
Initial Clinical Review    Admission: Date/Time/Statement:   Admission Orders (From admission, onward)       Ordered        11/07/24 0828  INPATIENT ADMISSION  Once                          Orders Placed This Encounter   Procedures    INPATIENT ADMISSION     Standing Status:   Standing     Number of Occurrences:   1     Order Specific Question:   Level of Care     Answer:   Med Surg [16]     Order Specific Question:   Estimated length of stay     Answer:   More than 2 Midnights     Order Specific Question:   Certification     Answer:   I certify that inpatient services are medically necessary for this patient for a duration of greater than two midnights. See H&P and MD Progress Notes for additional information about the patient's course of treatment.         Initial Presentation: 54 y.o. male transferred from [facility] to [place] admitted Inpatient d/t Cardiomyopathy. PMH of alcohol abuse, diabetes, hypertension, orthostatic hypotension, PTSD, seizures, initially presented to the [facility] facility with dyspnea and lower extremity swelling.  He was admitted to the hospital service with cardiology consultation and treated for acute CHF.  He responded well to IV diuresis and has since been transition back to oral diuretics.  However when he repeat echocardiogram was obtained he was found to have a drop in EF to 25%, from 40 to 45% prior.  Due to this recommendation was to transfer to [facility] for cardiac catheterization.  At the time my evaluation patient is resting comfortably in bed with no complaints.  At this point in time, he feels well and feels as if he could go home pending the cardiac catheterization test. BUN 27, CREAT 1.39.MG 1.1.  NPO.furosemide, spironolactone, metoprolol.SSI. intake/output, daily weights.Consistent carb diet          Anticipated Length of Stay/Certification Statement: Patient will be admitted on an inpatient basis with an anticipated length of stay of greater than 2  midnights secondary to cardiology evaluation, cardiac catheterization, medication titration, dispo planning,.     Date: 11/7   Day 2:  denies chest pain or sob. Notes diarrhea since Tuesday but reports he chronically has diarrhea at home since his pancreas surgery. Notes diarrhea every 2 hours, brown stool, denies blood or melena. Also reports chronic left abdominal pain 3/10 since his surgery that he reports is constant.                                    Scheduled Medications:  busPIRone, 30 mg, Oral, BID  ferrous sulfate, 325 mg, Oral, Daily With Breakfast  furosemide, 40 mg, Oral, Daily  heparin (porcine), 5,000 Units, Subcutaneous, Q8H HERBERTH  insulin glargine, 5 Units, Subcutaneous, QAM  insulin lispro, 1-6 Units, Subcutaneous, 4x Daily (with meals and at bedtime)  lamoTRIgine, 100 mg, Oral, Daily  levETIRAcetam, 500 mg, Oral, Q12H HERBERTH  magnesium sulfate, 4 g, Intravenous, Once  melatonin, 6 mg, Oral, HS  metoprolol tartrate, 12.5 mg, Oral, Q12H HERBERTH  midodrine, 10 mg, Oral, BID  mirtazapine, 30 mg, Oral, HS  multivitamin-minerals, 1 tablet, Oral, Daily  pantoprazole, 20 mg, Oral, Early Morning  saccharomyces boulardii, 250 mg, Oral, BID  spironolactone, 50 mg, Oral, Daily  thiamine, 100 mg, Oral, Daily    PRN Meds:  acetaminophen, 650 mg, Oral, Q6H PRN  HYDROcodone-acetaminophen, 1 tablet, Oral, Q12H PRN  loperamide, 2 mg, Oral, TID PRN      ED Triage Vitals   Temperature Pulse Respirations Blood Pressure SpO2 Pain Score   11/06/24 2124 11/06/24 2119 11/06/24 2119 11/06/24 2119 11/06/24 2119 11/06/24 2250   98.4 °F (36.9 °C) 68 18 126/93 96 % 3     Weight (last 2 days)       Date/Time Weight    11/07/24 0540 65.1 (143.5)    11/06/24 2124 66.6 (146.83)            Vital Signs (last 3 days)       Date/Time Temp Pulse Resp BP MAP (mmHg) SpO2 Patient Position - Orthostatic VS Pain    11/07/24 08:40:59 -- 61 18 118/83 95 93 % Lying --    11/07/24 0800 -- -- -- -- -- -- -- No Pain    11/07/24 06:47:32 98 °F (36.7 °C) 66  20 122/89 100 92 % -- --    11/07/24 03:09:01 97.9 °F (36.6 °C) 79 18 123/90 101 93 % -- --    11/06/24 2300 -- -- -- -- -- -- -- 3    11/06/24 2250 -- -- -- -- -- -- -- 3    11/06/24 22:43:14 -- 67 -- 125/94 104 94 % -- --    11/06/24 2243 -- 69 -- 125/94 -- -- -- --    11/06/24 2124 98.4 °F (36.9 °C) 68 18 126/93 -- -- -- --    11/06/24 21:19:53 -- 68 -- 126/93 104 97 % -- --    11/06/24 21:19:23 -- 68 18 126/93 104 96 % -- --              Pertinent Labs/Diagnostic Test Results:           Results from last 7 days   Lab Units 11/07/24 0448 11/05/24 0528 11/04/24  1704   WBC Thousand/uL 6.30 8.49 9.86   HEMOGLOBIN g/dL 14.5 14.2 14.3   HEMATOCRIT % 45.4 44.9 44.8   PLATELETS Thousands/uL 229 207 240   TOTAL NEUT ABS Thousands/µL  --   --  6.59         Results from last 7 days   Lab Units 11/07/24  0448 11/06/24  0431 11/05/24  0528 11/04/24  1704   SODIUM mmol/L 141 141 142 141   POTASSIUM mmol/L 4.6 4.7 4.3 4.5   CHLORIDE mmol/L 105 107 109* 110*   CO2 mmol/L 26 26 25 24   ANION GAP mmol/L 10 8 8 7   BUN mg/dL 27* 24 22 23   CREATININE mg/dL 1.39* 1.47* 1.33* 1.41*   EGFR ml/min/1.73sq m 56 52 59 55   CALCIUM mg/dL 9.6 9.4 9.1 8.8   MAGNESIUM mg/dL 1.1*  --   --   --      Results from last 7 days   Lab Units 11/04/24  1704   AST U/L 38   ALT U/L 24   ALK PHOS U/L 350*   TOTAL PROTEIN g/dL 6.5   ALBUMIN g/dL 3.4*   TOTAL BILIRUBIN mg/dL 0.58     Results from last 7 days   Lab Units 11/07/24  0558 11/06/24  2251 11/06/24  1602 11/06/24  1058 11/06/24  0652 11/05/24  2121 11/05/24  1626 11/05/24  1102 11/05/24  0746 11/04/24  2113   POC GLUCOSE mg/dl 118 112 119 145* 89 143* 197* 110 74 297*     Results from last 7 days   Lab Units 11/07/24  0448 11/06/24  0431 11/05/24  0528 11/04/24  1704   GLUCOSE RANDOM mg/dL 112 99 69 249*         Results from last 7 days   Lab Units 11/05/24  0528   HEMOGLOBIN A1C % 6.6*   EAG mg/dl 143       Results from last 7 days   Lab Units 11/04/24  2116 11/04/24  1920 11/04/24  1704    HS TNI 0HR ng/L  --   --  112*   HS TNI 2HR ng/L  --  77*  --    HSTNI D2 ng/L  --  -35  --    HS TNI 4HR ng/L 64*  --   --    HSTNI D4 ng/L -48  --   --        Results from last 7 days   Lab Units 11/04/24  1704   BNP pg/mL 3,272*       Results from last 7 days   Lab Units 11/04/24  1745   CLARITY UA  Clear   COLOR UA  Light Yellow   SPEC GRAV UA  1.020   PH UA  5.0   GLUCOSE UA mg/dl Negative   KETONES UA mg/dl Negative   BLOOD UA  Trace*   PROTEIN UA mg/dl 30 (1+)*   NITRITE UA  Negative   BILIRUBIN UA  Negative   UROBILINOGEN UA (BE) mg/dl <2.0   LEUKOCYTES UA  Negative   WBC UA /hpf 1-2   RBC UA /hpf 1-2   BACTERIA UA /hpf Occasional   EPITHELIAL CELLS WET PREP /hpf None Seen       Past Medical History:   Diagnosis Date    Alcoholic hepatitis     Anxiety     Chronic liver disease     Depression     Diabetes (HCC)     Diabetes mellitus (HCC)     Diabetic ulcer of toe of right foot associated with type 2 diabetes mellitus (HCC) 06/27/2020    Diabetic ulcer of toe of right foot associated with type 2 diabetes mellitus, with fat layer exposed ([facility]) 06/27/2020    Hypertension     Iron deficiency anemia     Lactic acidosis 06/27/2020    Pancreatitis     PTSD (post-traumatic stress disorder)     Seizure (HCC)     Seizure disorder ([facility]) 06/27/2020    Seizures ([facility])     Sepsis, unspecified organism ([facility]) 06/27/2020    Type 2 diabetes mellitus with diabetic polyneuropathy, without long-term current use of insulin ([facility]) 03/25/2019     Present on Admission:   Type 2 diabetes mellitus with diabetic polyneuropathy, without long-term current use of insulin (HCC)   Cardiomyopathy (HCC)   Acute on chronic systolic heart failure (HCC)   Stage 3a chronic kidney disease (HCC)   Seizure disorder (HCC)   Orthostatic hypotension      Admitting Diagnosis: CHF (congestive heart failure) (HCC) [I50.9]  Age/Sex: 54 y.o. male    Network Utilization Review Department  ATTENTION: Please call with any questions or concerns to [phone]  and carefully listen to the prompts so that you are directed to the right person. All voicemails are confidential.   For Discharge needs, contact Care Management DC Support Team at [phone] opt. [phone]  Send all requests for admission clinical reviews, approved or denied determinations and any other requests to dedicated fax number below belonging to the campus where the patient is receiving treatment. List of dedicated fax numbers for the Facilities:  FACILITY NAME UR FAX NUMBER   ADMISSION DENIALS (Administrative/Medical Necessity) [phone]   DISCHARGE SUPPORT TEAM (NETWORK) [phone]   PARENT CHILD HEALTH (Maternity/NICU/Pediatrics) [phone]   General acute hospital [phone]   [facility] [phone]   [facility] [phone]   [facility] [phone]   [facility] [phone]   [facility] [phone]   [facility] [phone]   [facility] [phone]   [facility] [phone]   [facility] [phone]   [facility] [phone]   [facility] [phone]

## 2024-11-07 NOTE — ASSESSMENT & PLAN NOTE
Admitted [facility] with acute on chronic CHF  echocardiogram showed drop in EF to 25%, 40 to 45% prior  Transferred to [facility] for ischemic workup  n.p.o. after midnight for cardiac catheterization potential cardiac catheterization today  Continue with furosemide, spironolactone, metoprolol  Monitor intake/output, daily weights

## 2024-11-07 NOTE — OCCUPATIONAL THERAPY NOTE
Occupational Therapy Evaluation     Patient Name: Ryan Farnsworth  Today's Date: 11/7/2024  Problem List  Principal Problem:    Cardiomyopathy (HCC)  Active Problems:    Type 2 diabetes mellitus with diabetic polyneuropathy, without long-term current use of insulin (HCC)    Seizure disorder (HCC)    Orthostatic hypotension    Acute on chronic systolic heart failure (HCC)    Stage 3a chronic kidney disease (HCC)    Functional diarrhea    Hypomagnesemia    Past Medical History  Past Medical History:   Diagnosis Date    Alcoholic hepatitis     Anxiety     Chronic liver disease     Depression     Diabetes (HCC)     Diabetes mellitus (HCC)     Diabetic ulcer of toe of right foot associated with type 2 diabetes mellitus (HCC) 06/27/2020    Diabetic ulcer of toe of right foot associated with type 2 diabetes mellitus, with fat layer exposed (HCC) 06/27/2020    Hypertension     Iron deficiency anemia     Lactic acidosis 06/27/2020    Pancreatitis     PTSD (post-traumatic stress disorder)     Seizure (HCC)     Seizure disorder (HCC) 06/27/2020    Seizures (HCC)     Sepsis, unspecified organism (HCC) 06/27/2020    Type 2 diabetes mellitus with diabetic polyneuropathy, without long-term current use of insulin (HCC) 03/25/2019     Past Surgical History  Past Surgical History:   Procedure Laterality Date    ABDOMINAL SURGERY      CARDIAC CATHETERIZATION      PANCREATECTOMY      SPLENECTOMY      TONSILLECTOMY               11/07/24 1052   OT Last Visit   OT Visit Date 11/07/24   Note Type   Note type Evaluation   Pain Assessment   Pain Assessment Tool 0-10   Pain Score No Pain   Restrictions/Precautions   Weight Bearing Precautions Per Order No   Other Precautions Chair Alarm;Bed Alarm;Multiple lines;Telemetry;Fall Risk  (reports BLE foot drop)   Home Living   Type of Home House   Home Layout Multi-level;Bed/bath upstairs  (stairglide to enter and between all floors)   Bathroom Shower/Tub Walk-in shower   Bathroom Toilet  "Raised   Bathroom Equipment Grab bars in shower;Shower chair;Grab bars around toilet   Bathroom Accessibility Accessible   Home Equipment Walker;Cane;Wheelchair-manual;Other (Comment)  (rollator)   Additional Comments Pt lives in a 4 SH with stairglide to enter and between all floors, uses a walk-in shower with GB and shower chair, raised toilet with GB   Prior Function   Level of [place] Independent with ADLs;Independent with functional mobility;Independent with IADLS  (I with some IADL, assist for some IADL)   Lives With Alone   Receives Help From Friend(s);Home health  ([facility] comes [facility]x per [facility] for [facility] box, visiting nurses)   IADLs Family/Friend/Other provides meals;Family/Friend/Other provides medication management  (easy meals/doordash)   Falls in the last 6 months 5 to 10  (reports 9 falls)   Vocational On disability   Lifestyle   Autonomy Pta pt I with ADL and functional mobility using cane vs RW, has assist for some IADL.   Reciprocal Relationships supportive friend   Service to Others on disability   Intrinsic Gratification enjoys watching TV   Subjective   Subjective \"I feel pressure, but nothing else\"   ADL   Where Assessed Edge of bed   Eating Assistance 6  Modified independent   Grooming Assistance 5  Supervision/Setup   UB Bathing Assistance 5  Supervision/Setup   LB Bathing Assistance 4  Minimal Assistance   UB Dressing Assistance 5  Supervision/Setup   LB Dressing Assistance 4  Minimal Assistance   Toileting Assistance  5  Supervision/Setup   Functional Assistance 5  Supervision/Setup   Bed Mobility   Supine to Sit 5  Supervision   Additional items Verbal cues   Additional Comments Pt greeted in bed, left in chair with alarm on and all needs within reach.   Transfers   Sit to Stand 5  Supervision   Additional items Verbal cues   Stand to Sit 5  Supervision   Additional items Verbal cues   Additional Comments with RW   Functional Mobility   Functional Mobility 5  Supervision   Additional Comments " Pt performs short household distance mobility with supervision with RW.   Additional items Rolling walker   Balance   Static Sitting Fair +   Dynamic Sitting Fair   Static Standing Fair -   Dynamic Standing Fair -   Ambulatory Fair -   Activity Tolerance   Activity Tolerance Patient limited by fatigue   Medical Staff Made Aware Co-eval with DPT due to medical complexity   Nurse Made Aware RN cleared for therapy   RUE Assessment   RUE Assessment WFL   LUE Assessment   LUE Assessment WFL   Hand Function   Gross Motor Coordination Functional   Fine Motor Coordination Functional   Sensation   Light Touch Severe deficits in the RUE;Severe deficits in the LUE   Additional Comments reports numbness in BUE from the elbow>hand at baseline   Cognition   Overall Cognitive Status WFL  (although with some decreased insight)   Arousal/Participation Cooperative;Alert   Attention Attends with cues to redirect   Orientation Level Oriented X4   Memory Decreased recall of precautions   Following Commands Follows one step commands without difficulty   Comments Pt cooperative to therapy although with some decreased insight to deficits.   Assessment   Limitation Decreased ADL status;Decreased Safe judgement during ADL;Decreased endurance;Decreased self-care trans;Decreased high-level ADLs   Prognosis Good   Assessment Pt is a 54 y.o. male who was admitted to [facility] on 11/6/2024 with Cardiomyopathy (HCC), undergoing ischemic workup. Pt seen for an OT evaluation per active OT orders.  Pt  has a past medical history of Alcoholic hepatitis, Anxiety, Chronic liver disease, Depression, Diabetes (HCC), Diabetes mellitus (HCC), Diabetic ulcer of toe of right foot associated with type 2 diabetes mellitus (HCC), Diabetic ulcer of toe of right foot associated with type 2 diabetes mellitus, with fat layer exposed (HCC), Hypertension, Iron deficiency anemia, Lactic acidosis, Pancreatitis, PTSD (post-traumatic stress disorder), Seizure  (HCC), Seizure disorder (HCC), Seizures (HCC), Sepsis, unspecified organism (HCC), and Type 2 diabetes mellitus with diabetic polyneuropathy, without long-term current use of insulin (HCC). Pt lives in a 4  with stair glide to enter and between floors, uses a walk-in shower with GB and shower chair, raised toilet with GB. Pta, pt was independent w/ ADL/ some IADL and functional mobility. Currently, pt is Supervision for UB ADL, Min Ax1 for LB ADL, and completed transfers/FM w Supervision with RW. Pt currently presents with impairments in the following categories -difficulty performing ADLS and limited insight into deficits activity tolerance, endurance, insight, safety , and judgement . These impairments, as well as pt's fatigue and risk for falls  limit pt's ability to safely engage in all baseline areas of occupation, includinggrooming, bathing, dressing, toileting, functional mobility/transfers, and community mobility.  The patient's raw score on the AM-PAC Daily Activity Inpatient Short Form is 20. A raw score of greater than or equal to 19 suggests the patient may benefit from discharge to home. Please refer to the recommendation of the Occupational Therapist for safe discharge planning. Pt would benefit from continued acute OT services throughout hospital course and following D/C. Plan for OT interventions 2-3x per week. From OT standpoint, recommend home with social support, level III services upon D/C. Pt was left seated in bedside chair with chair alarm on and all needs within reach.   Goals   Patient Goals to get better   Plan   Treatment Interventions ADL retraining;Functional transfer training;Endurance training;Continued evaluation;Energy conservation   Goal Expiration Date 11/21/24   OT Frequency 2-3x/wk   Discharge Recommendation   Rehab Resource Intensity Level, OT III (Minimum Resource Intensity)   -PAC Daily Activity Inpatient   Lower Body Dressing 3   Bathing 3   Toileting 3   Upper Body  Dressing 3   Grooming 4   Eating 4   Daily Activity Raw Score 20   Daily Activity Standardized Score (Calc for Raw Score >=11) 42.03   AM-PAC Applied Cognition Inpatient   Following a Speech/Presentation 4   Understanding Ordinary Conversation 4   Taking Medications 3   Remembering Where Things Are Placed or Put Away 3   Remembering List of 4-5 Errands 3   Taking Care of Complicated Tasks 3   Applied Cognition Raw Score 20   Applied Cognition Standardized Score 41.76   End of Consult   Education Provided Yes   Patient Position at End of Consult Bedside chair;Bed/Chair alarm activated;All needs within reach   Nurse Communication Nurse aware of consult       OT Goals    - Pt will be Mod I with LB ADL by end of hospital course.    - Pt will be Mod I with UB ADL by end of hospital course.    - Pt will be Mod I with all functional transfers required for patient safety by end of hospital course.    - Pt will be Mod I with functional mobility to/from bathroom for increased independence with toileting tasks.    - Pt will independently recall and implement safety precautions during OT sessions.     - Pt activity tolerance will increase to 30 minutes in order to safely engage in ADL and transfers.     - Pt standing tolerance will increase to 5 minutes  in order to promote sink side ADLs and IADL activities.    - Pt will consistently follow one-step directions with min to no vc or prompting.     - Pt will attend to functional tasks for 10 minutes with min to no vc for attention/redirection.    - Pt will attend to and complete ongoing cognitive assessment in order to inform safe discharge planning.            JAIDEN Ricks, OTR/L

## 2024-11-07 NOTE — ASSESSMENT & PLAN NOTE
Patient with history myositis/rhabdomyolysis in the past, does not take statin secondarily to this   Ezetimbe 10 mg daily

## 2024-11-07 NOTE — ASSESSMENT & PLAN NOTE
Patient to [facility] with complaints of increasing shortness of breath, and lower extremity edema.  States he noticed symptoms since April, with a 10 pound weight gain in 3 weeks. CXR on arrival showed with bilateral pleural effusions, BMP 3,272.     Patient initially treated with IV lasix and was transitioned to oral lasix once back to baseline weight.     Wt Readings from Last 3 Encounters:   11/07/24 65.1 kg (143 lb 8 oz)   11/06/24 66.8 kg (147 lb 4.3 oz)   04/04/23 74.4 kg (164 lb)     Neurohormonal blockade  BB: Metoprolol 12.5 mg twice daily - will switch to Toprol   ACE/ARB/ARNI: Patient is on midodrine - will consider if no longer on midodrine   MRA: Sprinolactone 50 mg   SLGT2: will price check   Diuretic: Lasix 40 mg daily - will decrease, patient appears euvolemic     Device: will offer lifevest when closer to discharge

## 2024-11-07 NOTE — PLAN OF CARE
Problem: PHYSICAL THERAPY ADULT  Goal: Performs mobility at highest level of function for planned discharge setting.  See evaluation for individualized goals.  Description: Treatment/Interventions: ADL retraining, Functional transfer training, LE strengthening/ROM, Elevations, Therapeutic exercise, Endurance training, Cognitive reorientation, Patient/family training, Equipment eval/education, Bed mobility, Gait training, Compensatory technique education, Spoke to nursing, Spoke to case management, Spoke to advanced practitioner, OT          See flowsheet documentation for full assessment, interventions and recommendations.  Note: Prognosis: Good  Problem List: Decreased strength, Decreased range of motion, Decreased endurance, Impaired balance, Decreased mobility, Decreased coordination, Decreased cognition, Impaired judgement, Decreased safety awareness, Impaired sensation, Pain, Decreased skin integrity      Pt is 54 y.o. male seen for PT evaluation s/p admit to [facility]  as a transfer from [facility] where he presents initially w/ QUINONES and LE swelling. Pt  dx w/ Cardiomyopathy (HCC). Pt w/  drop in EF to 25%, from 40 to 45% prior.  Pt underwent L heart catheterization.  Pt  has a past medical history of Alcoholic hepatitis, Anxiety, Chronic liver disease, Depression, Diabetes ([facility]), Diabetes mellitus ([facility]), Diabetic ulcer of toe of right foot associated with type 2 diabetes mellitus ([facility]) (06/27/2020), Diabetic ulcer of toe of right foot associated with type 2 diabetes mellitus, with fat layer exposed ([facility]) (06/27/2020), Hypertension, Iron deficiency anemia, Lactic acidosis (06/27/2020), Pancreatitis, PTSD (post-traumatic stress disorder), Seizure ([facility]), Seizure disorder ([facility]) (06/27/2020), Seizures ([facility]), Sepsis, unspecified organism ([facility]) (06/27/2020), and Type 2 diabetes mellitus with diabetic polyneuropathy, without long-term current use of insulin ([facility]) (03/25/2019).  Due to acute medical issues,  ongoing medical workup for primary dx; pain, fall risk, increased reliance on more restrictive AD compared to baseline;  decreased activity tolerance compared to baseline, increased assistance needed from caregiver at current time, continuous monitoring, trending labs;  multiple lines, decline in overall functional mobility status; health management issues; note unstable clinical picture (high complexity).  At baseline, pt reports being homebound; not leaving his multilevel home and having [facility] and RN services come into his home. Pt uses SPC in home + furniture walks. 0 drive; has 9-10 falls recently. I w/ ADls. Refusing rehab- wants to only go home. Currently,  pt  is requiring S A for bed skills; S for functional transfers and S> CGA w/ turn for ambulation w/ RW 90+70' w/ standing rest break. Pt on RA. Was educated on safety and recommended to use RW 2* hx of severe polyneuropathy in feet/ hands w/ hx of multiple falls.   Pt presents functioning below baseline and currently w/ overall mobility deficits 2* to: decreased LE strength/AROM; severely impaired sensation in feet and hands. generalized weakness/ deconditioning; decreased endurance; decreased activity tolerance; decreased coordination; impaired balance; gait deviations; decreased safety awareness; SOB/QUINONES; fatigue; impaired safety and judgement; limited insight into current deficits; bed/ chair alarms; multiple lines;  Pt currently at risk for falls.  (Please find additional objective findings from PT assessment regarding body systems outlined above.) Pt will continue to benefit from skilled PT interventions to address stated impairments; to maximize functional potential; for ongoing pt/ family training; and DME needs.  PT is recommending PT Resource Intensity Level  3 (home PT + RW use  on d/c.      PT will follow for STG as outlined on eval. Pt/ family agreeable to PT POC and goals as stated.      Rehab Resource Intensity Level, PT: III (Minimum Resource  Intensity)    See flowsheet documentation for full assessment.

## 2024-11-07 NOTE — RESTORATIVE TECHNICIAN NOTE
Restorative Technician Note      Patient Name: Ryan Farnsworth     [facility] Visit Date: 11/07/24  Note Type: Mobility  Patient Position Upon Consult: Supine  Activity Performed: Ambulated; Other (Comment) (to/from the BR)  Assistive Device: Cane; Other (Comment) (personal cane)  Patient Position at End of Consult: Bed/Chair alarm activated; All needs within reach; Supine

## 2024-11-07 NOTE — ASSESSMENT & PLAN NOTE
Initially admitted to the [facility] with a/c systolic CHF, as evidenced by lower extremity edema and shortness of breath. Status post IV diuresis and transitioned to PO lasix. Transferred to [facility] for ischemic workup given drop in EF on repeat echocardiogram  C/w p.o. furosemide 40 mg daily  ECHO 11/5/24- EF 25%, systolic function severely reduced, severe global hypokinesis with regional variation. Diastolic function is abnormal. Right ventricular cavity size is mildly dilated, systolic function is moderately reduced. The left and right atrium mildly dilated. AV mild regurgitation. MV/TV mild to moderate regurgitation.  Daily weights/Strict I & Os  GDMT: Has had difficulties in the past tolerating goal-directed medical therapy secondary to orthostatic hypotension  Continue spironolactone 50 mg daily  Continue metoprolol 12.5 mg twice daily

## 2024-11-07 NOTE — PLAN OF CARE
Problem: OCCUPATIONAL THERAPY ADULT  Goal: Performs self-care activities at highest level of function for planned discharge setting.  See evaluation for individualized goals.  Description: Treatment Interventions: ADL retraining, Functional transfer training, Endurance training, Continued evaluation, Energy conservation          See flowsheet documentation for full assessment, interventions and recommendations.   Outcome: Progressing  Note: Limitation: Decreased ADL status, Decreased Safe judgement during ADL, Decreased endurance, Decreased self-care trans, Decreased high-level ADLs  Prognosis: Good  Assessment: Pt is a 54 y.o. male who was admitted to [facility] on 11/6/2024 with Cardiomyopathy (HCC), undergoing ischemic workup. Pt seen for an OT evaluation per active OT orders.  Pt  has a past medical history of Alcoholic hepatitis, Anxiety, Chronic liver disease, Depression, Diabetes (HCC), Diabetes mellitus (HCC), Diabetic ulcer of toe of right foot associated with type 2 diabetes mellitus (HCC), Diabetic ulcer of toe of right foot associated with type 2 diabetes mellitus, with fat layer exposed (HCC), Hypertension, Iron deficiency anemia, Lactic acidosis, Pancreatitis, PTSD (post-traumatic stress disorder), Seizure (HCC), Seizure disorder (HCC), Seizures (HCC), Sepsis, unspecified organism (HCC), and Type 2 diabetes mellitus with diabetic polyneuropathy, without long-term current use of insulin (HCC). Pt lives in a 4  with stair glide to enter and between floors, uses a walk-in shower with GB and shower chair, raised toilet with GB. Pta, pt was independent w/ ADL/ some IADL and functional mobility. Currently, pt is Supervision for UB ADL, Min Ax1 for LB ADL, and completed transfers/FM w Supervision with RW. Pt currently presents with impairments in the following categories -difficulty performing ADLS and limited insight into deficits activity tolerance, endurance, insight, safety , and judgement .  These impairments, as well as pt's fatigue and risk for falls  limit pt's ability to safely engage in all baseline areas of occupation, includinggrooming, bathing, dressing, toileting, functional mobility/transfers, and community mobility.  The patient's raw score on the AM-PAC Daily Activity Inpatient Short Form is 20. A raw score of greater than or equal to 19 suggests the patient may benefit from discharge to home. Please refer to the recommendation of the Occupational Therapist for safe discharge planning. Pt would benefit from continued acute OT services throughout hospital course and following D/C. Plan for OT interventions 2-3x per week. From OT standpoint, recommend home with social support, level III services upon D/C. Pt was left seated in bedside chair with chair alarm on and all needs within reach.     Rehab Resource Intensity Level, OT: III (Minimum Resource Intensity)

## 2024-11-07 NOTE — UTILIZATION REVIEW
NOTIFICATION OF INPATIENT ADMISSION   AUTHORIZATION REQUEST   SERVICING FACILITY:   [facility]  Address: [address] [place] [address]  Tax ID: [id number]  NPI: [id number] ATTENDING PROVIDER:  Attending Name and NPI#: Luis Lazar Md [[id number]]  Address: [address] [place] [address]  Phone: [phone]   ADMISSION INFORMATION:  Place of Service: Inpatient [facility]  Place of Service Code: 21  Inpatient Admission Date/Time: 11/6/24  9:17 PM  Discharge Date/Time: No discharge date for patient encounter.  Admitting Diagnosis Code/Description:  CHF (congestive heart failure) ([facility]) [I50.9]     UTILIZATION REVIEW CONTACT:  Ricyk Wong Utilization   Network Utilization Review Department  Phone: [phone]  Fax: [phone]  Email: [email or web address]  Contact for approvals/pending authorizations, clinical reviews, and discharge.     PHYSICIAN ADVISORY SERVICES:  Medical Necessity Denial & Jrmf-sw-Hucj Review  Phone: [phone]  Fax: [phone]  Email: [email or web address]     DISCHARGE SUPPORT TEAM:  For Patients Discharge Needs & Updates  Phone: [phone] opt. 2 Fax: [phone]  Email: [email or web address]

## 2024-11-07 NOTE — PROGRESS NOTES
Progress Note - Hospitalist   Name: Ryan Farnsworth 54 y.o. male I MRN: [id number]  Unit/Bed#: PPHP-327-01 I Date of Admission: 11/6/2024   Date of Service: 11/7/2024 I Hospital Day: 1    Assessment & Plan  Cardiomyopathy (HCC)  Admitted [facility] with acute on chronic CHF  echocardiogram showed drop in EF to 25%, 40 to 45% prior  Transferred to [facility] for ischemic workup  n.p.o. after midnight for cardiac catheterization potential cardiac catheterization today  Continue with furosemide, spironolactone, metoprolol  Monitor intake/output, daily weights    Acute on chronic systolic heart failure (HCC)  Initially admitted to the [facility] with a/c systolic CHF, as evidenced by lower extremity edema and shortness of breath. Status post IV diuresis and transitioned to PO lasix. Transferred to \[facility]\"" for ischemic workup given drop in EF on repeat echocardiogram  C/w p.o. furosemide 40 mg daily  ECHO 11/5/24- EF 25%, systolic function severely reduced, severe global hypokinesis with regional variation. Diastolic function is abnormal. Right ventricular cavity size is mildly dilated, systolic function is moderately reduced. The left and right atrium mildly dilated. AV mild regurgitation. MV/TV mild to moderate regurgitation.  Daily weights/Strict I & Os  GDMT: Has had difficulties in the past tolerating goal-directed medical therapy secondary to orthostatic hypotension  Continue spironolactone 50 mg daily  Continue metoprolol 12.5 mg twice daily  Type 2 diabetes mellitus with diabetic polyneuropathy, without long-term current use of insulin ([facility])  Hemoglobin A1c shows good control at 6.6%  Continue Lantus 5 units every morning  Sliding scale for correctional coverage  Hypoglycemia protocol  Consistent carb diet    Seizure disorder ([facility])  Continue on home Keppra and Lamictal  Orthostatic hypotension  Continue midodrine 10 mg twice daily  Stage 3a chronic kidney disease ([facility])  Lab Results    Component Value Date    CREATININE 1.39 (H) 11/07/2024    CREATININE 1.47 (H) 11/06/2024    CREATININE 1.33 (H) 11/05/2024   Renal function at baseline  Continue to monitor  Functional diarrhea  Chronic, noted after pancreas surgery   Add probiotic   Add imodium prn   Hypomagnesemia  Replete     VTE Pharmacologic Prophylaxis:   High Risk (Score >/= 5) - Pharmacological DVT Prophylaxis Ordered: heparin. Sequential Compression Devices Ordered.    Mobility:   Basic Mobility Inpatient Raw Score: 24  JH-HLM Goal: 8: Walk 250 feet or more  JH-HLM Achieved: 8: Walk 250 feet ot more  JH-HLM Goal achieved. Continue to encourage appropriate mobility.    Patient Centered Rounds: I performed bedside rounds with nursing staff today.   Discussions with Specialists or Other Care Team Provider: d/w RN     Education and Discussions with Family / Patient: Patient declined call to .     Current Length of Stay: 1 day(s)  Current Patient Status: Inpatient   Certification Statement: The patient will continue to require additional inpatient hospital stay due to pending cardiac cath   Discharge Plan: Anticipate discharge in 24-48 hrs to home.    Code Status: Level 1 - Full Code    Subjective   Pt denies chest pain or sob. Notes diarrhea since Tuesday but reports he chronically has diarrhea at home since his pancreas surgery. Notes diarrhea every 2 hours, brown stool, denies blood or melena. Also reports chronic left abdominal pain 3/10 since his surgery that he reports is constant.     Objective :  Temp:  [97.9 °F (36.6 °C)-98.4 °F (36.9 °C)] 98 °F (36.7 °C)  HR:  [61-79] 61  BP: (118-126)/(83-94) 118/83  Resp:  [18-20] 20  SpO2:  [88 %-97 %] 93 %    Body mass index is 18.93 kg/m².     Input and Output Summary (last 24 hours):     Intake/Output Summary (Last 24 hours) at 11/7/2024 0931  Last data filed at 11/7/2024 0713  Gross per 24 hour   Intake 60 ml   Output 100 ml   Net -40 ml       Physical Exam  Constitutional:        General: He is not in acute distress.     Comments: Chronically ill appearing, thin    Cardiovascular:      Rate and Rhythm: Normal rate and regular rhythm.      Pulses: Normal pulses.      Heart sounds: Normal heart sounds. No murmur heard.  Pulmonary:      Effort: No respiratory distress.      Breath sounds: Normal breath sounds. No wheezing or rales.   Abdominal:      General: Bowel sounds are normal. There is no distension.      Palpations: Abdomen is soft.      Tenderness: There is no abdominal tenderness.   Musculoskeletal:         General: No swelling or tenderness.      Right lower leg: Edema (trace) present.      Left lower leg: Edema (trace) present.   Skin:     General: Skin is warm and dry.      Findings: No erythema or rash.      Comments: Right foot wound    Neurological:      General: No focal deficit present.      Mental Status: He is alert. Mental status is at baseline.   Psychiatric:         Attention and Perception: Attention normal.         Mood and Affect: Mood normal.                             Lines/Drains:        Telemetry:  Telemetry Orders (From admission, onward)               24 Hour Telemetry Monitoring  Continuous x 24 Hours (Telem)        Question:  Reason for 24 Hour Telemetry  Answer:  PCI/EP study (including pacer and ICD implementation), Cardiac surgery, MI, abnormal cardiac cath, and chest pain- rule out MI                        Indication for Continued Telemetry Use: Awaiting PCI/EP Study/CABG               Lab Results: I have reviewed the following results:   Results from last 7 days   Lab Units 11/07/24  0448 11/05/24  0528 11/04/24  1704   WBC Thousand/uL 6.30   < > 9.86   HEMOGLOBIN g/dL 14.5   < > 14.3   HEMATOCRIT % 45.4   < > 44.8   PLATELETS Thousands/uL 229   < > 240   SEGS PCT %  --   --  67   LYMPHO PCT %  --   --  23   MONO PCT %  --   --  7   EOS PCT %  --   --  2    < > = values in this interval not displayed.     Results from last 7 days   Lab Units  11/07/24  0448 11/05/24  0528 11/04/24  1704   SODIUM mmol/L 141   < > 141   POTASSIUM mmol/L 4.6   < > 4.5   CHLORIDE mmol/L 105   < > 110*   CO2 mmol/L 26   < > 24   BUN mg/dL 27*   < > 23   CREATININE mg/dL 1.39*   < > 1.41*   ANION GAP mmol/L 10   < > 7   CALCIUM mg/dL 9.6   < > 8.8   ALBUMIN g/dL  --   --  3.4*   TOTAL BILIRUBIN mg/dL  --   --  0.58   ALK PHOS U/L  --   --  350*   ALT U/L  --   --  24   AST U/L  --   --  38   GLUCOSE RANDOM mg/dL 112   < > 249*    < > = values in this interval not displayed.         Results from last 7 days   Lab Units 11/07/24  0558 11/06/24  2251 11/06/24  1602 11/06/24  1058 11/06/24  0652 11/05/24  2121 11/05/24  1626 11/05/24  1102 11/05/24  0746 11/04/24  2113   POC GLUCOSE mg/dl 118 112 119 145* 89 143* 197* 110 74 297*     Results from last 7 days   Lab Units 11/05/24  0528   HEMOGLOBIN A1C % 6.6*           Recent Cultures (last 7 days):         Imaging Results Review: I reviewed radiology reports from this admission including: chest xray.  Other Study Results Review: No additional pertinent studies reviewed.    Last 24 Hours Medication List:     Current Facility-Administered Medications:     acetaminophen (TYLENOL) tablet 650 mg, Q6H PRN    busPIRone (BUSPAR) tablet 30 mg, BID    ferrous sulfate tablet 325 mg, Daily With Breakfast    furosemide (LASIX) tablet 40 mg, Daily    heparin (porcine) subcutaneous injection 5,000 Units, Q8H HERBERTH    HYDROcodone-acetaminophen (NORCO) 5-325 mg per tablet 1 tablet, Q12H PRN    insulin glargine (LANTUS) subcutaneous injection 5 Units 0.05 mL, QAM    insulin lispro (HumALOG/ADMELOG) 100 units/mL subcutaneous injection 1-6 Units, 4x Daily (with meals and at bedtime) **AND** Fingerstick Glucose (POCT), 4x Daily AC and at bedtime    lamoTRIgine (LaMICtal) tablet 100 mg, Daily    levETIRAcetam (KEPPRA) tablet 500 mg, Q12H HERBERTH    magnesium sulfate 4 g/100 mL IVPB (premix) 4 g, Once, Last Rate: 4 g (11/07/24 0844)    melatonin tablet 6 mg,  HS    metoprolol tartrate (LOPRESSOR) partial tablet 12.5 mg, Q12H HERBERTH    midodrine (PROAMATINE) tablet 10 mg, BID    mirtazapine (REMERON) tablet 30 mg, HS    multivitamin-minerals (CENTRUM) tablet 1 tablet, Daily    pantoprazole (PROTONIX) EC tablet 20 mg, Early Morning    spironolactone (ALDACTONE) tablet 50 mg, Daily    thiamine tablet 100 mg, Daily    Administrative Statements   Today, Patient Was Seen By: ZEFERINO Alegre      **Please Note: This note may have been constructed using a voice recognition system.**

## 2024-11-07 NOTE — ASSESSMENT & PLAN NOTE
Admitted [facility] with acute on chronic CHF  Repeat echocardiogram shows drop in EF to 25%, 40 to 45% prior  Transferred to [address] [place] [address] for ischemic workup    Will make n.p.o. after midnight for cardiac catheterization potential cardiac catheterization tomorrow  Continue with furosemide, spironolactone, metoprolol  Monitor intake/output, daily weights

## 2024-11-07 NOTE — ASSESSMENT & PLAN NOTE
Lab Results   Component Value Date    CREATININE 1.39 (H) 11/07/2024    CREATININE 1.47 (H) 11/06/2024    CREATININE 1.33 (H) 11/05/2024   Renal function at baseline  Continue to monitor

## 2024-11-07 NOTE — CONSULTS
Consultation - Heart Failure   Name: Ryan Farnsworth 54 y.o. male I MRN: [id number]  Unit/Bed#: PPHP-327-01 I Date of Admission: 11/6/2024   Date of Service: 11/7/2024 I Hospital Day: 1   Inpatient consult to Cardiology  Consult performed by: ZEFERINO Carmen  Consult ordered by: Hammad Wade DO      Physician Requesting Evaluation: Luis Lazar MD   Reason for Evaluation / Principal Problem: Cardiomyopathy, Acute on chronic heart failure     Assessment & Plan  Acute on chronic systolic heart failure (HCC)  Patient to [facility] with complaints of increasing shortness of breath, and lower extremity edema.  States he noticed symptoms since April, with a 10 pound weight gain in 3 weeks. CXR on arrival showed with bilateral pleural effusions, BMP 3,272.     Patient initially treated with IV lasix and was transitioned to oral lasix once back to baseline weight.     Wt Readings from Last 3 Encounters:   11/07/24 65.1 kg (143 lb 8 oz)   11/06/24 66.8 kg (147 lb 4.3 oz)   04/04/23 74.4 kg (164 lb)     Neurohormonal blockade  BB: Metoprolol 12.5 mg twice daily - will switch to Toprol   ACE/ARB/ARNI: Patient is on midodrine - will consider if no longer on midodrine   MRA: Sprinolactone 50 mg   SLGT2: will price check   Diuretic: Lasix 40 mg daily - will decrease, patient appears euvolemic     Device: will offer lifevest when closer to discharge   Cardiomyopathy (HCC)  Patient with known HFrEF with new reduction of EF to 25% from 45%  Etiology: unclear, History to ETOH abuse and nonobstructive CAD, new LBB noted on EKG     Diagnostics  EKG: LBB - new from previous EKGs  11/05/24 TTE: EF 25%, global hypokinesis, abn diastolic fxn  LA/RA mildly dilated, RV: mildly dilated, Mild to moderate MR/TR, mild AR   4/4/23 TTE: EF 45%, mild global hypokinesis, grade I dd, mild LA dilatation,   Orthostatic hypotension  Mildodrine 10 mg twice daily   Type 2 diabetes mellitus with diabetic polyneuropathy, without long-term  current use of insulin ([facility])  Lab Results   Component Value Date    HGBA1C 6.6 (H) 11/05/2024   Management per primary team   Seizure disorder ([facility])  Last seizure 2007 per patient  Lamotrigine 100 mg daily, Levetiracetam 500 mg twice daily   Stage 3a chronic kidney disease ([facility])  Baseline appears 1.3-1.5  Mixed hyperlipidemia  Patient with history myositis/rhabdomyolysis in the past, does not take statin secondarily to this   Ezetimbe 10 mg daily     Plan Recommendations  Left heart catheterization today  Decrease diuretic to Lasix 20 mg daily   May consider thoracentesis for pleural effusions   Will valenzuela check Jardiance  Will check TSH, iron panel, HIV, Hep c for completeness     History of Present Illness   Ryan Farnsworth is a 54 y.o. male with a pmh of alcohol abuse, type II diabetes, hypertension, HFrEF, chronic kidney disease, seizure disorder, orthostatic hypotension who presented to [facility] with shortness of breath and lower extremity edema.  Was treated initially with IV diuresis and transitioned to oral diuretics prior to transfer, with improved symptoms. In the course of his workup patient was found to have a decrease in his ejection fraction from previous echocardiogram and was transferred to [facility] for left heart catheterization.       Review of Systems   Constitutional:  Positive for fatigue and unexpected weight change. Negative for activity change, appetite change and fever.   Eyes:  Positive for visual disturbance. Negative for photophobia.   Respiratory:  Positive for chest tightness and shortness of breath. Negative for apnea, cough, choking, wheezing and stridor.    Cardiovascular:  Positive for chest pain and leg swelling. Negative for palpitations.   Gastrointestinal:  Positive for abdominal distention. Negative for abdominal pain, blood in stool, nausea and vomiting.   Genitourinary:  Negative for difficulty urinating, dysuria and frequency.   Skin:  Positive for wound. Negative for color  change, pallor and rash.   Neurological:  Positive for dizziness, weakness and light-headedness. Negative for seizures, numbness and headaches.   Hematological:  Does not bruise/bleed easily.     I have reviewed the patient's PMH, PSH, Social History, Family History, Meds, and Allergies  Social History     Tobacco Use    Smoking status: Never    Smokeless tobacco: Never   Vaping Use    Vaping status: Never Used   Substance and Sexual Activity    Alcohol use: Yes     Alcohol/week: 15.0 standard drinks of alcohol     Types: 15 Cans of beer per week     Comment: 15 cans beer daily    Drug use: Never    Sexual activity: Not on file       Objective :  Temp:  [97.9 °F (36.6 °C)-98.4 °F (36.9 °C)] 98 °F (36.7 °C)  HR:  [62-79] 66  BP: (118-126)/(85-94) 122/89  Resp:  [18-20] 20  SpO2:  [88 %-97 %] 92 %      First Weight: Weight - Scale: 66.6 kg (146 lb 13.2 oz) (11/06/24 2124)  Vitals:    11/06/24 2124 11/07/24 0540   Weight: 66.6 kg (146 lb 13.2 oz) 65.1 kg (143 lb 8 oz)       Physical Exam  Constitutional:       Appearance: Normal appearance. He is underweight.   HENT:      Head: Normocephalic and atraumatic.   Cardiovascular:      Rate and Rhythm: Normal rate and regular rhythm.      Pulses:           Radial pulses are 2+ on the right side and 2+ on the left side.        Dorsalis pedis pulses are 2+ on the right side and 2+ on the left side.      Heart sounds: Normal heart sounds, S1 normal and S2 normal.   Pulmonary:      Effort: Pulmonary effort is normal.      Breath sounds: Normal air entry. Examination of the right-lower field reveals decreased breath sounds. Examination of the left-lower field reveals decreased breath sounds. Decreased breath sounds present.   Abdominal:      General: Abdomen is flat. Bowel sounds are normal.      Palpations: Abdomen is soft.   Musculoskeletal:      Right lower leg: No edema.      Left lower leg: No edema.   Neurological:      Mental Status: He is alert.         Lab Results: I  have reviewed the following results:CBC/BMP:   .     11/07/24  0448   WBC 6.30   HGB 14.5   HCT 45.4      SODIUM 141   K 4.6      CO2 26   BUN 27*   CREATININE 1.39*   GLUC 112   MG 1.1*    , Troponin,BNP:No new results in last 24 hours.   Results from last 7 days   Lab Units 11/07/24  0448 11/05/24  0528 11/04/24  1704   WBC Thousand/uL 6.30 8.49 9.86   HEMOGLOBIN g/dL 14.5 14.2 14.3   HEMATOCRIT % 45.4 44.9 44.8   PLATELETS Thousands/uL 229 207 240     Results from last 7 days   Lab Units 11/07/24  0448 11/06/24  0431 11/05/24 0528   POTASSIUM mmol/L 4.6 4.7 4.3   CHLORIDE mmol/L 105 107 109*   CO2 mmol/L 26 26 25   BUN mg/dL 27* 24 22   CREATININE mg/dL 1.39* 1.47* 1.33*   CALCIUM mg/dL 9.6 9.4 9.1         Lab Results   Component Value Date    HGBA1C 6.6 (H) 11/05/2024     Lab Results   Component Value Date    CKTOTAL 73 07/24/2022       Imaging Results Review: I reviewed radiology reports from this admission including: chest xray and Echocardiogram.  Other Study Results Review: EKG was reviewed.     VTE Prophylaxis: VTE covered by:  heparin (porcine), Subcutaneous, 5,000 Units at 11/07/24 0531

## 2024-11-07 NOTE — ASSESSMENT & PLAN NOTE
Patient with known HFrEF with new reduction of EF to 25% from 45%  Etiology: unclear, History to ETOH abuse and nonobstructive CAD, new LBB noted on EKG     Diagnostics  EKG: LBB - new from previous EKGs  11/05/24 TTE: EF 25%, global hypokinesis, abn diastolic fxn  LA/RA mildly dilated, RV: mildly dilated, Mild to moderate MR/TR, mild AR   4/4/23 TTE: EF 45%, mild global hypokinesis, grade I dd, mild LA dilatation,

## 2024-11-07 NOTE — H&P
H&P - Hospitalist   Name: Ryan Farnsworth 54 y.o. male I MRN: [id number]  Unit/Bed#: PPHP-327-01 I Date of Admission: 11/6/2024   Date of Service: 11/6/2024 I Hospital Day: 0     Assessment & Plan  Cardiomyopathy (HCC)  Admitted [facility] with acute on chronic CHF  Repeat echocardiogram shows drop in EF to 25%, 40 to 45% prior  Transferred to [address] [place] [address] for ischemic workup    Will make n.p.o. after midnight for cardiac catheterization potential cardiac catheterization tomorrow  Continue with furosemide, spironolactone, metoprolol  Monitor intake/output, daily weights    Acute on chronic systolic heart failure (HCC)  Initially admitted to the [facility] with acute on chronic CHF, as evidenced by lower extremity edema and shortness of breath  Status post IV diuresis    Since been transition back to p.o. furosemide 40 mg daily  GDMT: Has had difficulties in the past tolerating goal-directed medical therapy secondary to orthostatic hypotension  Continue spironolactone 50 mg daily  Continue metoprolol 12.5 mg twice daily  Monitor vital signs closely and for signs or symptoms of orthostatic hypotension  Transferred to here for ischemic workup given drop in EF on repeat echocardiogram      Type 2 diabetes mellitus with diabetic polyneuropathy, without long-term current use of insulin ([facility])  Hemoglobin A1c shows good control at 6.6%  Continue Lantus 5 units every morning  Sliding scale for correctional coverage  Hypoglycemia protocol  Consistent carb diet    Seizure disorder (HCC)  Continue on home Keppra and Lamictal  Orthostatic hypotension  Continue midodrine 10 mg twice daily  Stage 3a chronic kidney disease (HCC)  Renal function at baseline  Continue to monitor      VTE Pharmacologic Prophylaxis:   Moderate Risk (Score 3-4) - Pharmacological DVT Prophylaxis Ordered: heparin.  Code Status: Level 1 - Full Code   Discussion with family: Patient declined call to .     Anticipated  Length of Stay: Patient will be admitted on an inpatient basis with an anticipated length of stay of greater than 2 midnights secondary to cardiology evaluation, cardiac catheterization, medication titration, dispo planning,.    History of Present Illness   Chief Complaint: acute chf    Ryan Farnsworth is a 54 y.o. male with a PMH of alcohol abuse, diabetes, hypertension, orthostatic hypotension, PTSD, seizures, initially presented to the [facility] with dyspnea and lower extremity swelling.  He was admitted to the hospital service with cardiology consultation and treated for acute CHF.  He responded well to IV diuresis and has since been transition back to oral diuretics.  However when he repeat echocardiogram was obtained he was found to have a drop in EF to 25%, from 40 to 45% prior.  Due to this recommendation was to transfer to [facility] for cardiac catheterization.  At the time my evaluation patient is resting comfortably in bed with no complaints.  At this point in time, he feels well and feels as if he could go home pending the cardiac catheterization test.  Otherwise all questions and concerns were addressed.  Plan of care discussed at length.    REVIEW OF SYSTEMS  General Denies fevers or chills. Denies generalized weakness or fatigue.    HEENT Denies hearing or vision changes.   Cardiovascular Denies chest pain. Denies LE swelling. Denies palpitations. Denies dyspnea on exertion.   Respiratory Denies cough. Denies difficulty breathing. Denies shortness of breath.   Genitourinary Denies hematuria. Denies dysuria. Denies difficulty voiding.Denies incontinence.   Gastrointestinal Denies nausea, vomiting, or diarrhea. Denies hematochezia, melena, or hematemesis.    Musculoskeletal Denies arthralgias or myalgias. Denies joint swelling.   Psychiatric  Denies changes in mood. Denies anxiety or depression.   Neurologic Denies headache. Denies lightheadedness/dizziness.Denies  numbness/tingling. Denies weakness.   Endocrine Denies weight loss or weight gain. Denies excessive thirst, sweating, urination.         Historical Information   Past Medical History:   Diagnosis Date    Alcoholic hepatitis     Anxiety     Chronic liver disease     Depression     Diabetes (HCC)     Diabetes mellitus (HCC)     Diabetic ulcer of toe of right foot associated with type 2 diabetes mellitus (HCC) 06/27/2020    Diabetic ulcer of toe of right foot associated with type 2 diabetes mellitus, with fat layer exposed (HCC) 06/27/2020    Hypertension     Iron deficiency anemia     Lactic acidosis 06/27/2020    Pancreatitis     PTSD (post-traumatic stress disorder)     Seizure (HCC)     Seizure disorder (HCC) 06/27/2020    Seizures (HCC)     Sepsis, unspecified organism (HCC) 06/27/2020    Type 2 diabetes mellitus with diabetic polyneuropathy, without long-term current use of insulin (HCC) 03/25/2019     Past Surgical History:   Procedure Laterality Date    ABDOMINAL SURGERY      CARDIAC CATHETERIZATION      PANCREATECTOMY      SPLENECTOMY      TONSILLECTOMY       Social History     Tobacco Use    Smoking status: Never    Smokeless tobacco: Never   Vaping Use    Vaping status: Never Used   Substance and Sexual Activity    Alcohol use: Yes     Alcohol/week: 15.0 standard drinks of alcohol     Types: 15 Cans of beer per week     Comment: 15 cans beer daily    Drug use: Never    Sexual activity: Not on file     E-Cigarette/Vaping    E-Cigarette Use Never User      E-Cigarette/Vaping Substances     No family history on file.  Social History:  Marital Status:    Occupation:   Patient Pre-hospital Living Situation: Home  Patient Pre-hospital Level of Mobility: walks  Patient Pre-hospital Diet Restrictions: none    Meds/Allergies   I have reviewed home medications using recent Epic encounter.  Prior to Admission medications    Medication Sig Start Date End Date Taking? Authorizing Provider   busPIRone (BUSPAR) 15  mg tablet Take 30 mg by mouth 2 (two) times a day    Historical Provider, MD   carboxymethylcellulose 0.5 % SOLN Administer 1 drop to both eyes 3 (three) times a day    Historical Provider, MD   clindamycin (CLEOCIN) 150 mg capsule Take 150 mg by mouth 4 (four) times a day    Historical Provider, MD   ergocalciferol (ERGOCALCIFEROL) 39321 units capsule Take 50,000 Units by mouth once a week    Historical Provider, MD   ferrous sulfate 325 (65 Fe) mg tablet Take 325 mg by mouth daily with breakfast    Historical Provider, MD   folic acid (FOLVITE) 1 mg tablet Take 1 mg by mouth daily    Historical Provider, MD   furosemide (LASIX) 20 mg tablet Take 20 mg by mouth daily as needed (for increased swelling and sob)    Historical Provider, MD   gemfibrozil (LOPID) 600 mg tablet Take 600 mg by mouth daily    Historical Provider, MD   glucose 4 g chewable tablet Chew 16 g as needed for low blood sugar    Historical Provider, MD   HYDROcodone-acetaminophen (NORCO) 5-325 mg per tablet Take 1 tablet by mouth every 12 (twelve) hours as needed for pain    Historical Provider, MD   lamoTRIgine (LaMICtal) 25 mg tablet Take 100 mg by mouth daily    Historical Provider, MD   levETIRAcetam (KEPPRA) 500 mg tablet Take 500 mg by mouth every 12 (twelve) hours    Historical Provider, MD   Magnesium Oxide 400 MG CAPS Take 400 mg by mouth 2 (two) times a day    Historical Provider, MD   Melatonin 5 MG CAPS Take 5 mg by mouth daily at bedtime    Historical Provider, MD   metFORMIN (GLUCOPHAGE) 1000 MG tablet Take 1,000 mg by mouth 2 (two) times a day with meals    Historical Provider, MD   midodrine (PROAMATINE) 10 MG tablet Take 1 tablet (10 mg total) by mouth 2 (two) times a day 11/1/22   Benjamin Perez MD   mirtazapine (REMERON) 30 mg tablet Take 30 mg by mouth daily at bedtime    Historical Provider, MD   Multiple Vitamins-Minerals (MULTIVITAMIN WITH MINERALS) tablet Take 1 tablet by mouth daily    Historical Provider, MD   omeprazole  (PriLOSEC) 20 mg delayed release capsule Take 20 mg by mouth daily    Historical Provider, MD   potassium chloride (K-DUR,KLOR-CON) 20 mEq tablet Take 20 mEq by mouth 2 (two) times a day    Historical Provider, MD   sertraline (ZOLOFT) 100 mg tablet Take 100 mg by mouth 2 (two) times a day    Historical Provider, MD   silver sulfadiazine (SILVADENE,SSD) 1 % cream Apply 1 Application topically every other day    Historical Provider, MD   spironolactone (ALDACTONE) 50 mg tablet Take 50 mg by mouth daily 10/30/24   Historical Provider, MD   thiamine (VITAMIN B1) 100 mg tablet Take 100 mg by mouth daily    Historical Provider, MD   vitamin E, tocopherol, 400 units capsule Take 400 Units by mouth daily    Historical Provider, MD     Allergies   Allergen Reactions    Statins Other (See Comments)     Lost muscles       Objective :  Temp:  [97.9 °F (36.6 °C)-98.4 °F (36.9 °C)] 98.4 °F (36.9 °C)  HR:  [62-72] 68  BP: (118-126)/(85-93) 126/93  Resp:  [17-18] 18  SpO2:  [88 %-97 %] 97 %    PHYSICAL EXAM:    Vitals signs reviewed  Constitutional   Awake and cooperative. NAD.   Head/Neck   Normocephalic. Atraumatic.   HEENT   No scleral icterus. EOMI.   Heart   Regular rate and rhythm. No murmers.   Lungs   Clear to auscultation bilaterally. Respirations unlaboured.   Abdomen   Soft. Nontender. Nondistended.    Skin   Skin color normal. No rashes.   Extremities   No deformities.  Trace lower extremity edema bilaterally.   Neuro   Alert and oriented. No new deficits.   Psych   Mood stable. Affect normal.         Lines/Drains:            Lab Results: I have reviewed the following results:  Results from last 7 days   Lab Units 11/05/24 0528 11/04/24  1704   WBC Thousand/uL 8.49 9.86   HEMOGLOBIN g/dL 14.2 14.3   HEMATOCRIT % 44.9 44.8   PLATELETS Thousands/uL 207 240   SEGS PCT %  --  67   LYMPHO PCT %  --  23   MONO PCT %  --  7   EOS PCT %  --  2     Results from last 7 days   Lab Units 11/06/24  0431 11/05/24 0528  11/04/24  1704   SODIUM mmol/L 141   < > 141   POTASSIUM mmol/L 4.7   < > 4.5   CHLORIDE mmol/L 107   < > 110*   CO2 mmol/L 26   < > 24   BUN mg/dL 24   < > 23   CREATININE mg/dL 1.47*   < > 1.41*   ANION GAP mmol/L 8   < > 7   CALCIUM mg/dL 9.4   < > 8.8   ALBUMIN g/dL  --   --  3.4*   TOTAL BILIRUBIN mg/dL  --   --  0.58   ALK PHOS U/L  --   --  350*   ALT U/L  --   --  24   AST U/L  --   --  38   GLUCOSE RANDOM mg/dL 99   < > 249*    < > = values in this interval not displayed.         Results from last 7 days   Lab Units 11/06/24  1602 11/06/24  1058 11/06/24  0652 11/05/24  2121 11/05/24  1626 11/05/24  1102 11/05/24  0746 11/04/24  2113   POC GLUCOSE mg/dl 119 145* 89 143* 197* 110 74 297*     Lab Results   Component Value Date    HGBA1C 6.6 (H) 11/05/2024    HGBA1C 10.4 (H) 07/04/2023    HGBA1C 9.6 (H) 03/21/2023           Imaging Results Review: I reviewed radiology reports from this admission including: Echocardiogram.  Other Study Results Review: EKG was reviewed.     Administrative Statements   I have spent a total time of 60 minutes in caring for this patient on the day of the visit/encounter including Diagnostic results, Prognosis, Risks and benefits of tx options, Patient and family education, Importance of tx compliance, Impressions, Counseling / Coordination of care, Documenting in the medical record, Reviewing / ordering tests, medicine, procedures  , Obtaining or reviewing history  , and Communicating with other healthcare professionals .    ** Please Note: This note has been constructed using a voice recognition system. **

## 2024-11-07 NOTE — APP STUDENT NOTE
JONY STUDENT  Inpatient Progress Note for TRAINING ONLY  Not Part of Legal Medical Record     Progress Note - Ryan Farnsworth 54 y.o. male MRN: [id number]  Unit/Bed#: [facility]-327-01 Encounter: [id number]      SANJUANA is a 53 y/o M who presented to outside ED 11/4 for an acute exacerbation of HFrEF, s/p IV diuresis with good response - now on PO lasix. Transferred to this campus for potential cardiac catheterization after notable drop in EF (25% from 40-45% prior). Now s/p LHC and cleared by heart failure team for d/c to home with cardiac rehab follow up and [facility].     Cardiomyopathy   - Repeat echo shows drop in EF to 25% from 40-45%   - Transferred to [facility] for ischemic workup   - Cardiac cath 11/7 demonstrated mild LAD lesion, no ischemia  - appreciate cards recommendations,  cleared for d/c by heart failure team  -  Decrease to lasix 20 mg QOD   Continue ASA and Zetia at home    Acute on chronic congestive heart failure  - [facility] noticeable [facility] gain, b/l LE edema and shortness of breath - s/p IV lasix diuresis, now on PO   - Echo 11/5 - EF 25% systolic function severely reduced, severe global hypokinesis with regional variation; diastolic function is abnormal; right ventricular cavity size is mildly dilated, systolic function is moderately reduced; the L and R atrium mildly dilated, AV mild regurg, MV/TV mild to moderate regurg  - Likely 2/2 to high sodium intake, less likely CAD component at this point given non-ischemic by LHC  - Appreciate cardiology recommendations as above   - GDMT - pt has had issues with orthostatic hypotension in past when on GDMT - tolerating PO lopressor and spironolactone additions at this time; now added zetia and jardiance, consider adding ARNI if pt is able to wean midodrine outpatient   - [facility] and will follow up with our cardiac rehab for an outpatient cardiac MRI, repeat ECHO in 3 months and further medication reconciliation  and will be organized through [facility] home services  - Educate patient to monitor weight and urine output at home, as well as extensive education regarding cardiac diet and reducing sodium intake    Type II DM  - A1c with improvement since    - Transition to outpatient regimen (6 units lantus in AM and sliding scale with meals)  - follow up outpatient with podiatry for neuropathy and PCP    Diabetic leg wounds  - wound care consult   - All present PTA - see media tab   - Per wound care, leave SHADY and avoid itching     Seizure disorder  - continue home keppra and Lamictal     Acute on chronic diarrhea  - Noted after pancreas surgery   - Added imodium and probiotic - has since much improved    VTE Pharmacologic Prophylaxis:   Pharmacologic: Heparin  Mechanical VTE Prophylaxis in Place: No    Patient Centered Rounds: I have performed bedside rounds with nursing staff today.    Discussions with Specialists or Other Care Team Provider: rn      Time Spent for Care: 20 minutes.  More than 50% of total time spent on counseling and coordination of care as described above.    Current Length of Stay: 2 day(s)    Current Patient Status: Inpatient   Certification Statement: the patient is going to be d/c to home today.     Discharge Plan: PT/OT rec rehab but pt wishes to return home with [facility] services that are already established    Code Status: Level 1 - Full Code    Subjective:   Pt resting in bed upon assessment. Reports decreased swelling in b/l LE, urinating very frequently but denies burning with urination, reports diarrhea has improved drastically since addition of imodium and probiotic. Reports chronic abdominal pain that he rates 3/10 but does not require pain medication.     Objective:     Vitals:   Temp (24hrs), Av.2 °F (36.8 °C), Min:97.9 °F (36.6 °C), Max:98.4 °F (36.9 °C)    Temp:  [97.9 °F (36.6 °C)-98.4 °F (36.9 °C)] 98.4 °F (36.9 °C)  HR:  [54-79] 54  Resp:  [17-20] 17  BP: (114-128)/(72-94) 114/72  SpO2:  [92  %-97 %] 93 %  Body mass index is 18.93 kg/m².     Input and Output Summary (last 24 hours):       Intake/Output Summary (Last 24 hours) at 2024 1135  Last data filed at 2024 0713  Gross per 24 hour   Intake 60 ml   Output 100 ml   Net -40 ml       Physical Exam:       Physical Exam  Constitutional:       Appearance: He is ill-appearing.   HENT:      Mouth/Throat:      Mouth: Mucous membranes are dry.   Eyes:      Pupils: Pupils are equal, round, and reactive to light.   Cardiovascular:      Rate and Rhythm: Normal rate and regular rhythm.      Pulses: Normal pulses.      Heart sounds: Normal heart sounds.   Pulmonary:      Effort: Pulmonary effort is normal.      Breath sounds: Normal breath sounds.   Abdominal:      General: Abdomen is flat.      Palpations: Abdomen is soft.      Tenderness: There is no abdominal tenderness.   Musculoskeletal:      Right lower le+ Edema present.      Left lower le+ Edema present.   Feet:      Comments: R foot wound - no signs of infection. Several other pre-existing diabetic leg / foot wounds. See media for pictures.   Skin:     General: Skin is dry.      Capillary Refill: Capillary refill takes less than 2 seconds.      Coloration: Skin is pale.   Neurological:      General: No focal deficit present.      Mental Status: He is alert and oriented to person, place, and time. Mental status is at baseline.   Psychiatric:         Mood and Affect: Mood normal.         Behavior: Behavior normal.       Historical Information   Past Medical History:   Diagnosis Date   • Alcoholic hepatitis    • Anxiety    • Chronic liver disease    • Depression    • Diabetes (HCC)    • Diabetes mellitus (HCC)    • Diabetic ulcer of toe of right foot associated with type 2 diabetes mellitus (HCC) 2020   • Diabetic ulcer of toe of right foot associated with type 2 diabetes mellitus, with fat layer exposed (HCC) 2020   • Hypertension    • Iron deficiency anemia    • Lactic  acidosis 06/27/2020   • Pancreatitis    • PTSD (post-traumatic stress disorder)    • Seizure (HCC)    • Seizure disorder ([facility]) 06/27/2020   • Seizures (HCC)    • Sepsis, unspecified organism ([facility]) 06/27/2020   • Type 2 diabetes mellitus with diabetic polyneuropathy, without long-term current use of insulin ([facility]) 03/25/2019     Past Surgical History:   Procedure Laterality Date   • ABDOMINAL SURGERY     • CARDIAC CATHETERIZATION     • PANCREATECTOMY     • SPLENECTOMY     • TONSILLECTOMY       Social History   Social History     Substance and Sexual Activity   Alcohol Use Yes   • Alcohol/week: 15.0 standard drinks of alcohol   • Types: 15 Cans of beer per week    Comment: 15 cans beer daily     Social History     Substance and Sexual Activity   Drug Use Never     Social History     Tobacco Use   Smoking Status Never   Smokeless Tobacco Never     Family History: Family history non-contributory    Meds/Allergies   all medications and allergies reviewed  Allergies   Allergen Reactions   • Statins Other (See Comments)     Lost muscles       Additional Data:     Labs:    Results from last 7 days   Lab Units 11/07/24 0448 11/05/24 0528 11/04/24  1704   WBC Thousand/uL 6.30   < > 9.86   HEMOGLOBIN g/dL 14.5   < > 14.3   HEMATOCRIT % 45.4   < > 44.8   PLATELETS Thousands/uL 229   < > 240   SEGS PCT %  --   --  67   LYMPHO PCT %  --   --  23   MONO PCT %  --   --  7   EOS PCT %  --   --  2    < > = values in this interval not displayed.     Results from last 7 days   Lab Units 11/07/24 0448 11/05/24 0528 11/04/24  1704   SODIUM mmol/L 141   < > 141   POTASSIUM mmol/L 4.6   < > 4.5   CHLORIDE mmol/L 105   < > 110*   CO2 mmol/L 26   < > 24   BUN mg/dL 27*   < > 23   CREATININE mg/dL 1.39*   < > 1.41*   ANION GAP mmol/L 10   < > 7   CALCIUM mg/dL 9.6   < > 8.8   ALBUMIN g/dL  --   --  3.4*   TOTAL BILIRUBIN mg/dL  --   --  0.58   ALK PHOS U/L  --   --  350*   ALT U/L  --   --  24   AST U/L  --   --  38   GLUCOSE RANDOM mg/dL  112   < > 249*    < > = values in this interval not displayed.         Results from last 7 days   Lab Units 11/07/24  1132 11/07/24  0558 11/06/24  2251 11/06/24  1602 11/06/24  1058 11/06/24  0652 11/05/24  2121 11/05/24  1626 11/05/24  1102 11/05/24  0746 11/04/24  2113   POC GLUCOSE mg/dl 78 118 112 119 145* 89 143* 197* 110 74 297*     Results from last 7 days   Lab Units 11/05/24  0528   HEMOGLOBIN A1C % 6.6*             * I Have Reviewed All Lab Data Listed Above.  * Additional Pertinent Lab Tests Reviewed: All Labs For Current Hospital Admission Reviewed      Recent Cultures (last 7 days):           Last 24 Hours Medication List:   Current Facility-Administered Medications   Medication Dose Route Frequency Provider Last Rate   • acetaminophen  650 mg Oral Q6H PRN Hammad Wade DO     • busPIRone  30 mg Oral BID Hammad Wade DO     • ferrous sulfate  325 mg Oral Daily With Breakfast Hammad Wade DO     • furosemide  40 mg Oral Daily Hammad Wade DO     • heparin (porcine)  5,000 Units Subcutaneous Q8H [facility] Hammad Wade DO     • HYDROcodone-acetaminophen  1 tablet Oral Q12H PRN Hammad Wade DO     • insulin glargine  5 Units Subcutaneous QAM Hammad Wade DO     • insulin lispro  1-6 Units Subcutaneous 4x Daily (with meals and at bedtime) Hammad Wade DO     • lamoTRIgine  100 mg Oral Daily Hammad Wade DO     • levETIRAcetam  500 mg Oral Q12H [facility] Hammad Wade DO     • loperamide  2 mg Oral TID PRN ZEFERINO Alegre     • magnesium sulfate  4 g Intravenous Once ZEFERINO Alegre 4 g (11/07/24 0844)   • melatonin  6 mg Oral HS Hammad Wade DO     • metoprolol tartrate  12.5 mg Oral Q12H [facility] Hammad Wade DO     • midodrine  10 mg Oral BID Hammad Wade DO     • mirtazapine  30 mg Oral HS Hammad Wade DO     • multivitamin-minerals  1 tablet Oral Daily  Hammad Wade, DO     • pantoprazole  20 mg Oral Early Morning Hammad Wade DO     • saccharomyces boulardii  250 mg Oral BID ZEFERINO Alegre     • spironolactone  50 mg Oral Daily Hammad Wade DO     • thiamine  100 mg Oral Daily Hammad Wade DO          Today, Patient Was Seen By: Emely Ruffin RN    ** Please Note: Dictation voice to text software may have been used in the creation of this document. **

## 2024-11-07 NOTE — ASSESSMENT & PLAN NOTE
Hemoglobin A1c shows good control at 6.6%  Continue Lantus 5 units every morning  Sliding scale for correctional coverage  Hypoglycemia protocol  Consistent carb diet

## 2024-11-08 PROBLEM — J90 PLEURAL EFFUSION: Status: ACTIVE | Noted: 2024-11-08

## 2024-11-08 PROBLEM — E43 SEVERE PROTEIN-CALORIE MALNUTRITION (HCC): Status: ACTIVE | Noted: 2024-11-08

## 2024-11-08 LAB
ANION GAP SERPL CALCULATED.3IONS-SCNC: 11 MMOL/L (ref 4–13)
BUN SERPL-MCNC: 26 MG/DL (ref 5–25)
CALCIUM SERPL-MCNC: 10 MG/DL (ref 8.4–10.2)
CHLORIDE SERPL-SCNC: 101 MMOL/L (ref 96–108)
CO2 SERPL-SCNC: 28 MMOL/L (ref 21–32)
CREAT SERPL-MCNC: 1.4 MG/DL (ref 0.6–1.3)
ERYTHROCYTE [DISTWIDTH] IN BLOOD BY AUTOMATED COUNT: 16.7 % (ref 11.6–15.1)
FERRITIN SERPL-MCNC: 300 NG/ML (ref 24–336)
GFR SERPL CREATININE-BSD FRML MDRD: 56 ML/MIN/1.73SQ M
GLUCOSE SERPL-MCNC: 111 MG/DL (ref 65–140)
GLUCOSE SERPL-MCNC: 180 MG/DL (ref 65–140)
GLUCOSE SERPL-MCNC: 186 MG/DL (ref 65–140)
GLUCOSE SERPL-MCNC: 213 MG/DL (ref 65–140)
GLUCOSE SERPL-MCNC: 87 MG/DL (ref 65–140)
HCT VFR BLD AUTO: 46.9 % (ref 36.5–49.3)
HCV AB SER QL: NORMAL
HGB BLD-MCNC: 15.3 G/DL (ref 12–17)
HIV 1+2 AB+HIV1 P24 AG SERPL QL IA: NORMAL
HIV 2 AB SERPL QL IA: NORMAL
HIV1 AB SERPL QL IA: NORMAL
HIV1 P24 AG SERPL QL IA: NORMAL
IRON SATN MFR SERPL: 22 % (ref 15–50)
IRON SERPL-MCNC: 58 UG/DL (ref 50–212)
MAGNESIUM SERPL-MCNC: 1.5 MG/DL (ref 1.9–2.7)
MCH RBC QN AUTO: 30.2 PG (ref 26.8–34.3)
MCHC RBC AUTO-ENTMCNC: 32.6 G/DL (ref 31.4–37.4)
MCV RBC AUTO: 93 FL (ref 82–98)
PLATELET # BLD AUTO: 268 THOUSANDS/UL (ref 149–390)
PMV BLD AUTO: 11.8 FL (ref 8.9–12.7)
POTASSIUM SERPL-SCNC: 4.2 MMOL/L (ref 3.5–5.3)
RBC # BLD AUTO: 5.06 MILLION/UL (ref 3.88–5.62)
SODIUM SERPL-SCNC: 140 MMOL/L (ref 135–147)
T4 FREE SERPL-MCNC: 1.06 NG/DL (ref 0.61–1.12)
TIBC SERPL-MCNC: 267 UG/DL (ref 250–450)
TSH SERPL DL<=0.05 MIU/L-ACNC: 12.08 UIU/ML (ref 0.45–4.5)
UIBC SERPL-MCNC: 209 UG/DL (ref 155–355)
WBC # BLD AUTO: 5.73 THOUSAND/UL (ref 4.31–10.16)

## 2024-11-08 PROCEDURE — 99232 SBSQ HOSP IP/OBS MODERATE 35: CPT | Performed by: INTERNAL MEDICINE

## 2024-11-08 PROCEDURE — 80048 BASIC METABOLIC PNL TOTAL CA: CPT

## 2024-11-08 PROCEDURE — 82728 ASSAY OF FERRITIN: CPT

## 2024-11-08 PROCEDURE — 99232 SBSQ HOSP IP/OBS MODERATE 35: CPT | Performed by: NURSE PRACTITIONER

## 2024-11-08 PROCEDURE — 86803 HEPATITIS C AB TEST: CPT

## 2024-11-08 PROCEDURE — 82948 REAGENT STRIP/BLOOD GLUCOSE: CPT

## 2024-11-08 PROCEDURE — 87389 HIV-1 AG W/HIV-1&-2 AB AG IA: CPT

## 2024-11-08 PROCEDURE — 83735 ASSAY OF MAGNESIUM: CPT

## 2024-11-08 PROCEDURE — 83550 IRON BINDING TEST: CPT

## 2024-11-08 PROCEDURE — 84439 ASSAY OF FREE THYROXINE: CPT

## 2024-11-08 PROCEDURE — 84443 ASSAY THYROID STIM HORMONE: CPT

## 2024-11-08 PROCEDURE — 85027 COMPLETE CBC AUTOMATED: CPT

## 2024-11-08 PROCEDURE — 83540 ASSAY OF IRON: CPT

## 2024-11-08 RX ORDER — LANOLIN ALCOHOL/MO/W.PET/CERES
400 CREAM (GRAM) TOPICAL 2 TIMES DAILY
Status: DISCONTINUED | OUTPATIENT
Start: 2024-11-08 | End: 2024-11-09 | Stop reason: HOSPADM

## 2024-11-08 RX ORDER — LANOLIN ALCOHOL/MO/W.PET/CERES
800 CREAM (GRAM) TOPICAL 2 TIMES DAILY
Status: DISCONTINUED | OUTPATIENT
Start: 2024-11-08 | End: 2024-11-08

## 2024-11-08 RX ORDER — MAGNESIUM SULFATE HEPTAHYDRATE 40 MG/ML
4 INJECTION, SOLUTION INTRAVENOUS ONCE
Status: COMPLETED | OUTPATIENT
Start: 2024-11-08 | End: 2024-11-08

## 2024-11-08 RX ADMIN — Medication 6 MG: at 21:29

## 2024-11-08 RX ADMIN — MAGNESIUM SULFATE HEPTAHYDRATE 4 G: 40 INJECTION, SOLUTION INTRAVENOUS at 12:20

## 2024-11-08 RX ADMIN — MIDODRINE HYDROCHLORIDE 10 MG: 5 TABLET ORAL at 08:59

## 2024-11-08 RX ADMIN — HEPARIN SODIUM 5000 UNITS: 5000 INJECTION, SOLUTION INTRAVENOUS; SUBCUTANEOUS at 21:29

## 2024-11-08 RX ADMIN — MAGNESIUM OXIDE TAB 400 MG (241.3 MG ELEMENTAL MG) 400 MG: 400 (241.3 MG) TAB at 17:47

## 2024-11-08 RX ADMIN — PANTOPRAZOLE SODIUM 20 MG: 20 TABLET, DELAYED RELEASE ORAL at 05:53

## 2024-11-08 RX ADMIN — MIRTAZAPINE 30 MG: 30 TABLET, FILM COATED ORAL at 21:30

## 2024-11-08 RX ADMIN — INSULIN LISPRO 1 UNITS: 100 INJECTION, SOLUTION INTRAVENOUS; SUBCUTANEOUS at 15:54

## 2024-11-08 RX ADMIN — FUROSEMIDE 20 MG: 20 TABLET ORAL at 09:00

## 2024-11-08 RX ADMIN — ASPIRIN 81 MG CHEWABLE TABLET 81 MG: 81 TABLET CHEWABLE at 08:59

## 2024-11-08 RX ADMIN — Medication 250 MG: at 08:59

## 2024-11-08 RX ADMIN — LEVETIRACETAM 500 MG: 500 TABLET, FILM COATED ORAL at 21:30

## 2024-11-08 RX ADMIN — INSULIN LISPRO 1 UNITS: 100 INJECTION, SOLUTION INTRAVENOUS; SUBCUTANEOUS at 12:20

## 2024-11-08 RX ADMIN — INSULIN LISPRO 2 UNITS: 100 INJECTION, SOLUTION INTRAVENOUS; SUBCUTANEOUS at 21:30

## 2024-11-08 RX ADMIN — LAMOTRIGINE 100 MG: 100 TABLET ORAL at 09:00

## 2024-11-08 RX ADMIN — FERROUS SULFATE TAB 325 MG (65 MG ELEMENTAL FE) 325 MG: 325 (65 FE) TAB at 09:09

## 2024-11-08 RX ADMIN — EMPAGLIFLOZIN 10 MG: 10 TABLET, FILM COATED ORAL at 15:55

## 2024-11-08 RX ADMIN — Medication 1 TABLET: at 08:59

## 2024-11-08 RX ADMIN — BUSPIRONE HYDROCHLORIDE 30 MG: 10 TABLET ORAL at 21:30

## 2024-11-08 RX ADMIN — BUSPIRONE HYDROCHLORIDE 30 MG: 10 TABLET ORAL at 08:59

## 2024-11-08 RX ADMIN — MAGNESIUM OXIDE TAB 400 MG (241.3 MG ELEMENTAL MG) 800 MG: 400 (241.3 MG) TAB at 12:20

## 2024-11-08 RX ADMIN — METOPROLOL SUCCINATE 12.5 MG: 25 TABLET, EXTENDED RELEASE ORAL at 09:00

## 2024-11-08 RX ADMIN — MIDODRINE HYDROCHLORIDE 10 MG: 5 TABLET ORAL at 21:30

## 2024-11-08 RX ADMIN — Medication 250 MG: at 17:47

## 2024-11-08 RX ADMIN — THIAMINE HCL TAB 100 MG 100 MG: 100 TAB at 08:59

## 2024-11-08 RX ADMIN — LEVETIRACETAM 500 MG: 500 TABLET, FILM COATED ORAL at 08:59

## 2024-11-08 RX ADMIN — EZETIMIBE 10 MG: 10 TABLET ORAL at 21:29

## 2024-11-08 RX ADMIN — HEPARIN SODIUM 5000 UNITS: 5000 INJECTION, SOLUTION INTRAVENOUS; SUBCUTANEOUS at 05:53

## 2024-11-08 RX ADMIN — HEPARIN SODIUM 5000 UNITS: 5000 INJECTION, SOLUTION INTRAVENOUS; SUBCUTANEOUS at 15:55

## 2024-11-08 RX ADMIN — SPIRONOLACTONE 50 MG: 50 TABLET, FILM COATED ORAL at 08:59

## 2024-11-08 RX ADMIN — INSULIN GLARGINE 5 UNITS: 100 INJECTION, SOLUTION SUBCUTANEOUS at 09:07

## 2024-11-08 NOTE — ASSESSMENT & PLAN NOTE
CXR 11/04: CHF with bilateral pleural effusions and basilar edema   CXR: 11/06: pleural effusions and bibasilar consolidations redemonstrated

## 2024-11-08 NOTE — ASSESSMENT & PLAN NOTE
Malnutrition Findings:                                 BMI Findings:           Body mass index is 18.38 kg/m².   Consult nutrition   Encourage PO intake

## 2024-11-08 NOTE — PROGRESS NOTES
Progress Note - Heart Failure   Name: Ryan Farnsworth 54 y.o. male I MRN: [id number]  Unit/Bed#: PPHP-327-01 I Date of Admission: 11/6/2024   Date of Service: 11/8/2024 I Hospital Day: 2    Assessment & Plan  Acute on chronic systolic heart failure (HCC)  Patient to [facility] with complaints of increasing shortness of breath, and lower extremity edema.  States he noticed symptoms since April, with a 10 pound weight gain in 3 weeks. CXR on arrival showed with bilateral pleural effusions, BMP 3,272, Echo with drop in EF from 45% in 2023 to 25%  Patient initially treated with IV lasix and was transitioned to oral lasix once back to baseline weight. Patient was transferred to [facility] for left heart catheterization     Weight:  63.2 today - down from 66.8 yesterday   I & O - negative 465 yesterday     Neurohormonal blockade  BB: Toprol 12.5 mg twice daily   ACE/ARB/ARNI: Patient is on midodrine - will consider if no longer on midodrine   MRA: Sprinolactone 50 mg   GT2: Price check Jardiance - No more than $11.00 per [facility] staff, will add today   Diuretic: Lasix 20 mg daily     Device: Discussed life vest with patient, patient refusing at this time, will recheck TTE outpatient to assess for device need.  May benefit from CRT as well.    Cardiomyopathy (HCC)  Patient with known HFrEF with new reduction of EF to 25% from 45%  Etiology: unclear, History to ETOH abuse and nonobstructive CAD, new LBB noted on EKG.  Iron panel normal, TSH elevated but free T4 normal. HIV and hep C pending.    Diagnostics  [facility] 11/08/24: moderate mid LAD disease.    EKG: LBB - new from previous EKGs  11/05/24 TTE: EF 25%, global hypokinesis, abn diastolic fxn  LA/RA mildly dilated, RV: mildly dilated, Mild to moderate MR/TR, mild AR   4/4/23 TTE: EF 45%, mild global hypokinesis, grade I dd, mild LA dilatation,   Coronary artery disease  [facility] 11/08/24: moderate mid LAD disease  Continue Asa, Zetia   Orthostatic hypotension  Mildodrine 10 mg twice  daily   Pleural effusion  CXR 11/04: CHF with bilateral pleural effusions and basilar edema   CXR: 11/06: pleural effusions and bibasilar consolidations redemonstrated   Type 2 diabetes mellitus with diabetic polyneuropathy, without long-term current use of insulin ([facility])  Lab Results   Component Value Date    HGBA1C 6.6 (H) 11/05/2024   Management per primary team   Seizure disorder (HCC)  Last seizure 2007 per patient  Lamotrigine 100 mg daily, Levetiracetam 500 mg twice daily   Stage 3a chronic kidney disease (HCC)  Baseline appears 1.3-1.5  Mixed hyperlipidemia  Patient with history myositis/rhabdomyolysis in the past, does not take statin secondarily to this   Ezetimbe 10 mg daily   Plan/Recommendations   Start Jardiance 10 mg po daily   Follow up HIV/Hep C   Magnesium repletion - 4 mg IV x 1, start home dose of magnesium oxide   Will secure outpatient follow up as [facility]   Anticipate DC in 24-48 hours        Subjective   Patient without complaints this morning     Objective :  Temp:  [97.3 °F (36.3 °C)-98.4 °F (36.9 °C)] 97.8 °F (36.6 °C)  HR:  [52-68] 67  BP: (113-131)/(72-91) 118/86  Resp:  [16-18] 16  SpO2:  [90 %-97 %] 94 %  O2 Device: None (Room air)  Nasal Cannula O2 Flow Rate (L/min):  [2 L/min] 2 L/min  Orthostatic Blood Pressures      Flowsheet Row Most Recent Value   Blood Pressure 118/86 filed at 11/08/2024 0855   Patient Position - Orthostatic VS Lying filed at 11/07/2024 1533          First Weight: Weight - Scale: 66.6 kg (146 lb 13.2 oz) (11/06/24 2124)  Vitals:    11/07/24 0540 11/08/24 0600   Weight: 65.1 kg (143 lb 8 oz) 63.2 kg (139 lb 5.3 oz)     Physical Exam  Constitutional:       Appearance: Normal appearance.   HENT:      Head: Normocephalic and atraumatic.   Cardiovascular:      Pulses: Normal pulses.      Heart sounds: Normal heart sounds.   Pulmonary:      Effort: Pulmonary effort is normal.      Breath sounds: Examination of the right-lower field reveals decreased  breath sounds. Examination of the left-lower field reveals decreased breath sounds. Decreased breath sounds present.   Abdominal:      General: Abdomen is flat. Bowel sounds are normal.      Palpations: Abdomen is soft.   Musculoskeletal:      Right lower le+ Pitting Edema present.      Left lower le+ Pitting Edema present.   Skin:     General: Skin is warm.      Capillary Refill: Capillary refill takes less than 2 seconds.   Neurological:      General: No focal deficit present.      Mental Status: He is alert and oriented to person, place, and time.         Lab Results: I have reviewed the following results:  Results from last 7 days   Lab Units 24   WBC Thousand/uL 5.73 6.30 8.49   HEMOGLOBIN g/dL 15.3 14.5 14.2   HEMATOCRIT % 46.9 45.4 44.9   PLATELETS Thousands/uL 268 229 207     Results from last 7 days   Lab Units 24  043   POTASSIUM mmol/L 4.2 4.6 4.7   CHLORIDE mmol/L 101 105 107   CO2 mmol/L 28 26 26   BUN mg/dL 26* 27* 24   CREATININE mg/dL 1.40* 1.39* 1.47*   CALCIUM mg/dL 10.0 9.6 9.4         Lab Results   Component Value Date    HGBA1C 6.6 (H) 2024     Lab Results   Component Value Date    CKTOTAL 73 2022       Imaging Results Review: I reviewed radiology reports from this admission including: chest xray.  Other Study Results Review: No additional pertinent studies reviewed.    VTE Pharmacologic Prophylaxis: VTE covered by:  heparin (porcine), Subcutaneous, 5,000 Units at 24 0553     VTE Mechanical Prophylaxis: sequential compression device

## 2024-11-08 NOTE — ASSESSMENT & PLAN NOTE
Non-obstructive CAD   Noted to have 50% mid LAD lesion  cardiology medical management   C/w asa 81mg daily and zetia, unable to tolerate statin

## 2024-11-08 NOTE — CASE MANAGEMENT
Case Management Discharge Planning Note    Patient name Ryan Farnsworth  [facility] MRN [id number]  : 1970 Date 2024       Current Admission Date: 2024  Current Admission Diagnosis:Cardiomyopathy (HCC)   Patient Active Problem List    Diagnosis Date Noted Date Diagnosed    Pleural effusion 2024     Functional diarrhea 2024     Hypomagnesemia 2024     Cardiomyopathy (HCC) 2024     H/O splenectomy 2024     Obstructive sleep apnea syndrome 2024     Acute on chronic systolic heart failure (HCC) 2024     Stage 3a chronic kidney disease ([facility]) 2024     GERD (gastroesophageal reflux disease) 2023     Alcohol intake above recommended sensible limits 2023     Coronary artery disease 2023     Mixed hyperlipidemia 2022     Orthostatic hypotension 2022     Diabetic ulcer of toe of right foot associated with type 2 diabetes mellitus ([facility]) 2020     Seizure disorder ([facility]) 2020     PTSD (post-traumatic stress disorder) 2020     Gastrointestinal hemorrhage with hematemesis 2019     Alcoholic ketoacidosis 2019     Type 2 diabetes mellitus with diabetic polyneuropathy, without long-term current use of insulin ([facility]) 2019     Chronic alcohol abuse 2019     SUSIE (acute kidney injury) ([facility]) 2019     Anxiety 2017     Depression 2017     History of seizure 2017     Iron deficiency anemia 2017       LOS (days): 2  Geometric Mean LOS (GMLOS) (days): 2.7  Days to GMLOS:1.1     OBJECTIVE:  Risk of Unplanned Readmission Score: 17.49       Current admission status: Inpatient   Preferred Pharmacy:   [facility]  [address]  [place] [address]  Phone: [phone] Fax: [phone]    [facility][id number] - [place] - [address]  [address]  [place] [address]  Phone: [phone] Fax:  [phone]    Primary Care Provider: Guerrero Campa MD    Primary Insurance: [facility]  Secondary Insurance: MEDICARE    DISCHARGE DETAILS:  Discharge planning discussed with:: Pt bedside  Freedom of Choice: Yes     CM contacted family/caregiver?: No- see comments (declined)  Were Treatment Team discharge recommendations reviewed with patient/caregiver?: Yes  Did patient/caregiver verbalize understanding of patient care needs?: Yes  Were patient/caregiver advised of the risks associated with not following Treatment Team discharge recommendations?: Yes    Treatment Team Recommendation: Home with Home Health Care  Discharge Destination Plan:: Home with Home Health Care  Transport at Discharge : Wheelchair van (If family/friend cannot transport)      Additional Comments: CM reviewed chart and met with pt bedside to discuss DCP. Per chart review, pt currently [facility] the [facility] and nursing 1x/month. His doctor also visits in-home. 11/7 OP HF CM met with pt. Referral to complex CM has been made, per note, and will follow up with patient upon DC. CM met with pt bedside to review DCP. Pt is not interested in home OT/PT at this time. CM explained risks and benefits. Pt declined. CM to follow and assist with DCP    ADDENDUM  CM placed phone calls to JOSE Sharp with CM at [facility] ([phone]) and pt assigned RN with the [facility], Renetta ([phone]) left 's requesting call back inquiring pt eligibility for f/u with the HF team upon DC. CM to follow

## 2024-11-08 NOTE — ASSESSMENT & PLAN NOTE
Lab Results   Component Value Date    CREATININE 1.40 (H) 11/08/2024    CREATININE 1.39 (H) 11/07/2024    CREATININE 1.47 (H) 11/06/2024   Renal function at baseline  Continue to monitor

## 2024-11-08 NOTE — WOUND OSTOMY CARE
Consult Note - Wound   Ryan Farnsworth 54 y.o. male MRN: [id number]  Unit/Bed#: [facility]-327-01 Encounter: [id number]      Assessment Findings:   Patient seen today for wound care consult for diabetic foot wounds. Patient's B/L foot and legs have intact scabs that are not currently draining. They can be left SHADY and patient encouraged not to scratch.      Wounds:                          Cate SHELBYN, RN, CWOCN

## 2024-11-08 NOTE — PROGRESS NOTES
Progress Note - Hospitalist   Name: Ryan Farnsworth 54 y.o. male I MRN: [id number]  Unit/Bed#: PPHP-327-01 I Date of Admission: 11/6/2024   Date of Service: 11/8/2024 I Hospital Day: 2    Assessment & Plan  Cardiomyopathy (HCC)  Admitted [facility] with acute on chronic CHF  echocardiogram showed drop in EF to 25%, 40 to 45% prior  Transferred to [facility] for ischemic workup  Cardiac cath 11/7- moderate mid LAD disease   Cards recommending GDMT   C/w ASA and zetia- consider PCSK9i as well- pt doesn't tolerate statins   Cardiac rehab at discharge   Continue with furosemide 20mg daily   Declined life vest  Echo in 3 months   Monitor intake/output, daily weights    Acute on chronic systolic heart failure (HCC)  Initially admitted to the [facility] with a/c systolic CHF, as evidenced by lower extremity edema and shortness of breath. Status post IV diuresis and transitioned to PO lasix. Transferred to [facility] for ischemic workup given drop in EF on repeat echocardiogram  C/w p.o. furosemide 20 mg daily  ECHO 11/5/24- EF 25%, systolic function severely reduced, severe global hypokinesis with regional variation. Diastolic function is abnormal. Right ventricular cavity size is mildly dilated, systolic function is moderately reduced. The left and right atrium mildly dilated. AV mild regurgitation. MV/TV mild to moderate regurgitation.  Daily weights/Strict I & Os  LHC showing nonischemic nature   Per cards recommending echo at 3 months of GDMT   Pt declined lifevest   GDMT: Has had difficulties in the past tolerating goal-directed medical therapy secondary to orthostatic hypotension  Continue spironolactone 50 mg daily  Continue metoprolol 12.5 mg twice daily  Started on Jardiance 10mg daily   Type 2 diabetes mellitus with diabetic polyneuropathy, without long-term current use of insulin ([facility])  Hemoglobin A1c shows good control at 6.6%  Continue Lantus 5 units every morning  Sliding scale for  correctional coverage  Started on jardiance 10mg daily   Hypoglycemia protocol  Consistent carb diet    Seizure disorder (HCC)  Last seizure 2007   Continue on home Keppra and Lamictal  Orthostatic hypotension  Continue midodrine 10 mg twice daily  Stage 3a chronic kidney disease (HCC)  Lab Results   Component Value Date    CREATININE 1.40 (H) 11/08/2024    CREATININE 1.39 (H) 11/07/2024    CREATININE 1.47 (H) 11/06/2024   Renal function at baseline  Continue to monitor  Functional diarrhea  Chronic, noted after pancreas surgery   C/w probiotic   imodium prn   Hypomagnesemia  Will require aggressive repletion   Chronic alcohol abuse  Pt reports recently cutting back   Encourage complete cessation at discharge   PTSD (post-traumatic stress disorder)  C/w buspar and remeron   Mixed hyperlipidemia  Started zetia 10mg daily   Noted to not be able to tolerate statins   Coronary artery disease  Non-obstructive CAD   Noted to have 50% mid LAD lesion  cardiology medical management   C/w asa 81mg daily and zetia, unable to tolerate statin   Pleural effusion  Reassess as outpatient per cards  Currently on room air   C/w po lasix   Severe protein-calorie malnutrition (HCC)  Malnutrition Findings:                                 BMI Findings:           Body mass index is 18.38 kg/m².   Consult nutrition   Encourage PO intake     VTE Pharmacologic Prophylaxis:   High Risk (Score >/= 5) - Pharmacological DVT Prophylaxis Ordered: heparin. Sequential Compression Devices Ordered.    Mobility:   Basic Mobility Inpatient Raw Score: 19  JH-HLM Goal: 6: Walk 10 steps or more  JH-HLM Achieved: 7: Walk 25 feet or more  JH-HLM Goal achieved. Continue to encourage appropriate mobility.    Patient Centered Rounds: I performed bedside rounds with nursing staff today.   Discussions with Specialists or Other Care Team Provider: d/w RN d/w HF     Education and Discussions with Family / Patient: Patient declined call to .      Current Length of Stay: 2 day(s)  Current Patient Status: Inpatient   Certification Statement: The patient will continue to require additional inpatient hospital stay due to monitor labs   Discharge Plan: Anticipate discharge tomorrow to home with home services.    Code Status: Level 1 - Full Code    Subjective   Pt sitting oob in the chair, denies chest pain and reports sob improved. Reports still persisting with diarrhea same as chronic, and chronic abd pain no nausea. Denies all other complaints     Objective :  Temp:  [97.4 °F (36.3 °C)-98 °F (36.7 °C)] 97.5 °F (36.4 °C)  HR:  [56-68] 59  BP: ()/(68-88) 99/68  Resp:  [16-18] 16  SpO2:  [93 %-97 %] 93 %  O2 Device: None (Room air)    Body mass index is 18.38 kg/m².     Input and Output Summary (last 24 hours):     Intake/Output Summary (Last 24 hours) at 11/8/2024 1637  Last data filed at 11/8/2024 1200  Gross per 24 hour   Intake 776 ml   Output 325 ml   Net 451 ml       Physical Exam  Constitutional:       General: He is not in acute distress.     Appearance: He is cachectic.      Comments: Chronically ill appearing    Cardiovascular:      Rate and Rhythm: Normal rate and regular rhythm.      Pulses: Normal pulses.      Heart sounds: Normal heart sounds. No murmur heard.  Pulmonary:      Effort: No respiratory distress.      Breath sounds: Rales present. No wheezing.      Comments: Room air   Abdominal:      General: Bowel sounds are normal. There is no distension.      Palpations: Abdomen is soft.      Tenderness: There is no abdominal tenderness.   Musculoskeletal:         General: No swelling or tenderness.   Skin:     General: Skin is warm and dry.      Findings: No erythema or rash.   Neurological:      General: No focal deficit present.      Mental Status: He is alert. Mental status is at baseline.   Psychiatric:         Attention and Perception: Attention normal.         Mood and Affect: Mood normal.          Lines/Drains:        Telemetry:  Telemetry Orders (From admission, onward)               24 Hour Telemetry Monitoring  Continuous x 24 Hours (Telem)        Question:  Reason for 24 Hour Telemetry  Answer:  Decompensated CHF- and any one of the following: continuous diuretic infusion or total diuretic dose >200 mg daily, associated electrolyte derangement (I.e. K < 3.0), ionotropic drip (continuous infusion), hx of ventricular arrhythmia, or new EF < 35%                     Telemetry Reviewed: Normal Sinus Rhythm  Indication for Continued Telemetry Use: Lifevest (remains on tele entire hospital stay)               Lab Results: I have reviewed the following results:   Results from last 7 days   Lab Units 11/08/24 0419 11/05/24 0528 11/04/24  1704   WBC Thousand/uL 5.73   < > 9.86   HEMOGLOBIN g/dL 15.3   < > 14.3   HEMATOCRIT % 46.9   < > 44.8   PLATELETS Thousands/uL 268   < > 240   SEGS PCT %  --   --  67   LYMPHO PCT %  --   --  23   MONO PCT %  --   --  7   EOS PCT %  --   --  2    < > = values in this interval not displayed.     Results from last 7 days   Lab Units 11/08/24 0419 11/05/24 0528 11/04/24  1704   SODIUM mmol/L 140   < > 141   POTASSIUM mmol/L 4.2   < > 4.5   CHLORIDE mmol/L 101   < > 110*   CO2 mmol/L 28   < > 24   BUN mg/dL 26*   < > 23   CREATININE mg/dL 1.40*   < > 1.41*   ANION GAP mmol/L 11   < > 7   CALCIUM mg/dL 10.0   < > 8.8   ALBUMIN g/dL  --   --  3.4*   TOTAL BILIRUBIN mg/dL  --   --  0.58   ALK PHOS U/L  --   --  350*   ALT U/L  --   --  24   AST U/L  --   --  38   GLUCOSE RANDOM mg/dL 111   < > 249*    < > = values in this interval not displayed.         Results from last 7 days   Lab Units 11/08/24  1531 11/08/24  1041 11/08/24  0529 11/07/24  2032 11/07/24  1612 11/07/24  1132 11/07/24  0558 11/06/24  2251 11/06/24  1602 11/06/24  1058 11/06/24  0652 11/05/24  2121   POC GLUCOSE mg/dl 186* 180* 87 222* 94 78 118 112 119 145* 89 143*     Results from last 7 days   Lab Units  11/05/24  0528   HEMOGLOBIN A1C % 6.6*           Recent Cultures (last 7 days):         Imaging Results Review: I reviewed radiology reports from this admission including: chest xray and procedure reports.  Other Study Results Review: No additional pertinent studies reviewed.    Last 24 Hours Medication List:     Current Facility-Administered Medications:     acetaminophen (TYLENOL) tablet 650 mg, Q6H PRN    busPIRone (BUSPAR) tablet 30 mg, BID    Empagliflozin (JARDIANCE) tablet 10 mg, Daily    ezetimibe (ZETIA) tablet 10 mg, HS    ferrous sulfate tablet 325 mg, Daily With Breakfast    furosemide (LASIX) tablet 20 mg, Daily    heparin (porcine) subcutaneous injection 5,000 Units, Q8H HERBERTH    HYDROcodone-acetaminophen (NORCO) 5-325 mg per tablet 1 tablet, Q12H PRN    insulin glargine (LANTUS) subcutaneous injection 5 Units 0.05 mL, QAM    insulin lispro (HumALOG/ADMELOG) 100 units/mL subcutaneous injection 1-6 Units, 4x Daily (with meals and at bedtime) **AND** Fingerstick Glucose (POCT), 4x Daily AC and at bedtime    lamoTRIgine (LaMICtal) tablet 100 mg, Daily    levETIRAcetam (KEPPRA) tablet 500 mg, Q12H HERBERTH    loperamide (IMODIUM) capsule 2 mg, TID PRN    magnesium Oxide (MAG-OX) tablet 400 mg, BID    melatonin tablet 6 mg, HS    metoprolol succinate (TOPROL-XL) 24 hr tablet 12.5 mg, BID    midodrine (PROAMATINE) tablet 10 mg, BID    mirtazapine (REMERON) tablet 30 mg, HS    multivitamin-minerals (CENTRUM) tablet 1 tablet, Daily    pantoprazole (PROTONIX) EC tablet 20 mg, Early Morning    saccharomyces boulardii (FLORASTOR) capsule 250 mg, BID    spironolactone (ALDACTONE) tablet 50 mg, Daily    thiamine tablet 100 mg, Daily    Administrative Statements   Today, Patient Was Seen By: ZEFERINO Alegre      **Please Note: This note may have been constructed using a voice recognition system.**

## 2024-11-08 NOTE — ASSESSMENT & PLAN NOTE
Hemoglobin A1c shows good control at 6.6%  Continue Lantus 5 units every morning  Sliding scale for correctional coverage  Started on jardiance 10mg daily   Hypoglycemia protocol  Consistent carb diet

## 2024-11-08 NOTE — ASSESSMENT & PLAN NOTE
Patient with known HFrEF with new reduction of EF to 25% from 45%  Etiology: unclear, History to ETOH abuse and nonobstructive CAD, new LBB noted on EKG.  Iron panel normal, TSH elevated but free T4 normal. HIV and hep C pending.    Diagnostics  [facility] 11/08/24: moderate mid LAD disease.    EKG: LBB - new from previous EKGs  11/05/24 TTE: EF 25%, global hypokinesis, abn diastolic fxn  LA/RA mildly dilated, RV: mildly dilated, Mild to moderate MR/TR, mild AR   4/4/23 TTE: EF 45%, mild global hypokinesis, grade I dd, mild LA dilatation,

## 2024-11-08 NOTE — ASSESSMENT & PLAN NOTE
Initially admitted to the [facility] with a/c systolic CHF, as evidenced by lower extremity edema and shortness of breath. Status post IV diuresis and transitioned to PO lasix. Transferred to [facility] for ischemic workup given drop in EF on repeat echocardiogram  C/w p.o. furosemide 20 mg daily  ECHO 11/5/24- EF 25%, systolic function severely reduced, severe global hypokinesis with regional variation. Diastolic function is abnormal. Right ventricular cavity size is mildly dilated, systolic function is moderately reduced. The left and right atrium mildly dilated. AV mild regurgitation. MV/TV mild to moderate regurgitation.  Daily weights/Strict I & Os  LHC showing nonischemic nature   Per cards recommending echo at 3 months of GDMT   Pt declined lifevest   GDMT: Has had difficulties in the past tolerating goal-directed medical therapy secondary to orthostatic hypotension  Continue spironolactone 50 mg daily  Continue metoprolol 12.5 mg twice daily  Started on Jardiance 10mg daily

## 2024-11-08 NOTE — ASSESSMENT & PLAN NOTE
Patient to [facility] with complaints of increasing shortness of breath, and lower extremity edema.  States he noticed symptoms since April, with a 10 pound weight gain in 3 weeks. CXR on arrival showed with bilateral pleural effusions, BMP 3,272, Echo with drop in EF from 45% in 2023 to 25%  Patient initially treated with IV lasix and was transitioned to oral lasix once back to baseline weight. Patient was transferred to [facility] for left heart catheterization     Weight:  63.2 today - down from 66.8 yesterday   I & O - negative 465 yesterday     Neurohormonal blockade  BB: Toprol 12.5 mg twice daily   ACE/ARB/ARNI: Patient is on midodrine - will consider if no longer on midodrine   MRA: Sprinolactone 50 mg   SLGT2: Price check Jardiance - No more than $11.00 [facility], will add today   Diuretic: Lasix 20 mg daily     Device: Discussed life vest with patient, patient refusing at this time, will recheck TTE outpatient to assess for device need.  May benefit from CRT as well.

## 2024-11-08 NOTE — ASSESSMENT & PLAN NOTE
Admitted [facility] with acute on chronic CHF  echocardiogram showed drop in EF to 25%, 40 to 45% prior  Transferred to [facility] for ischemic workup  Cardiac cath 11/7- moderate mid LAD disease   Cards recommending GDMT   C/w ASA and zetia- consider PCSK9i as well- pt doesn't tolerate statins   Cardiac rehab at discharge   Continue with furosemide 20mg daily   Declined life vest  Echo in 3 months   Monitor intake/output, daily weights

## 2024-11-09 VITALS
HEART RATE: 72 BPM | TEMPERATURE: 98 F | DIASTOLIC BLOOD PRESSURE: 81 MMHG | HEIGHT: 73 IN | BODY MASS INDEX: 17.85 KG/M2 | OXYGEN SATURATION: 94 % | SYSTOLIC BLOOD PRESSURE: 117 MMHG | RESPIRATION RATE: 16 BRPM | WEIGHT: 134.7 LBS

## 2024-11-09 LAB
ANION GAP SERPL CALCULATED.3IONS-SCNC: 10 MMOL/L (ref 4–13)
BUN SERPL-MCNC: 28 MG/DL (ref 5–25)
CALCIUM SERPL-MCNC: 9.9 MG/DL (ref 8.4–10.2)
CHLORIDE SERPL-SCNC: 100 MMOL/L (ref 96–108)
CO2 SERPL-SCNC: 31 MMOL/L (ref 21–32)
CREAT SERPL-MCNC: 1.54 MG/DL (ref 0.6–1.3)
ERYTHROCYTE [DISTWIDTH] IN BLOOD BY AUTOMATED COUNT: 16.6 % (ref 11.6–15.1)
GFR SERPL CREATININE-BSD FRML MDRD: 50 ML/MIN/1.73SQ M
GLUCOSE SERPL-MCNC: 101 MG/DL (ref 65–140)
GLUCOSE SERPL-MCNC: 208 MG/DL (ref 65–140)
GLUCOSE SERPL-MCNC: 90 MG/DL (ref 65–140)
HCT VFR BLD AUTO: 48.4 % (ref 36.5–49.3)
HGB BLD-MCNC: 16.3 G/DL (ref 12–17)
MAGNESIUM SERPL-MCNC: 2.1 MG/DL (ref 1.9–2.7)
MCH RBC QN AUTO: 31.1 PG (ref 26.8–34.3)
MCHC RBC AUTO-ENTMCNC: 33.7 G/DL (ref 31.4–37.4)
MCV RBC AUTO: 92 FL (ref 82–98)
PLATELET # BLD AUTO: 264 THOUSANDS/UL (ref 149–390)
PMV BLD AUTO: 11 FL (ref 8.9–12.7)
POTASSIUM SERPL-SCNC: 4.6 MMOL/L (ref 3.5–5.3)
RBC # BLD AUTO: 5.24 MILLION/UL (ref 3.88–5.62)
SODIUM SERPL-SCNC: 141 MMOL/L (ref 135–147)
WBC # BLD AUTO: 5.72 THOUSAND/UL (ref 4.31–10.16)

## 2024-11-09 PROCEDURE — 83735 ASSAY OF MAGNESIUM: CPT | Performed by: NURSE PRACTITIONER

## 2024-11-09 PROCEDURE — 85027 COMPLETE CBC AUTOMATED: CPT | Performed by: NURSE PRACTITIONER

## 2024-11-09 PROCEDURE — 80048 BASIC METABOLIC PNL TOTAL CA: CPT | Performed by: NURSE PRACTITIONER

## 2024-11-09 PROCEDURE — 82948 REAGENT STRIP/BLOOD GLUCOSE: CPT

## 2024-11-09 PROCEDURE — 99239 HOSP IP/OBS DSCHRG MGMT >30: CPT | Performed by: NURSE PRACTITIONER

## 2024-11-09 PROCEDURE — 99232 SBSQ HOSP IP/OBS MODERATE 35: CPT | Performed by: INTERNAL MEDICINE

## 2024-11-09 RX ORDER — FUROSEMIDE 20 MG/1
20 TABLET ORAL EVERY OTHER DAY
Qty: 15 TABLET | Refills: 0 | Status: SHIPPED | OUTPATIENT
Start: 2024-11-10

## 2024-11-09 RX ORDER — LANOLIN ALCOHOL/MO/W.PET/CERES
400 CREAM (GRAM) TOPICAL 2 TIMES DAILY
Qty: 60 TABLET | Refills: 0 | Status: SHIPPED | OUTPATIENT
Start: 2024-11-09

## 2024-11-09 RX ORDER — FUROSEMIDE 20 MG/1
20 TABLET ORAL EVERY OTHER DAY
Status: DISCONTINUED | OUTPATIENT
Start: 2024-11-10 | End: 2024-11-09 | Stop reason: HOSPADM

## 2024-11-09 RX ORDER — INSULIN GLARGINE 100 [IU]/ML
5 INJECTION, SOLUTION SUBCUTANEOUS EVERY MORNING
Start: 2024-11-10

## 2024-11-09 RX ORDER — ASPIRIN 81 MG/1
81 TABLET ORAL DAILY
Status: DISCONTINUED | OUTPATIENT
Start: 2024-11-09 | End: 2024-11-09 | Stop reason: HOSPADM

## 2024-11-09 RX ORDER — SPIRONOLACTONE 50 MG/1
50 TABLET, FILM COATED ORAL DAILY
Qty: 30 TABLET | Refills: 0 | Status: SHIPPED | OUTPATIENT
Start: 2024-11-09

## 2024-11-09 RX ORDER — ASPIRIN 81 MG/1
81 TABLET ORAL DAILY
Qty: 30 TABLET | Refills: 0 | Status: SHIPPED | OUTPATIENT
Start: 2024-11-10

## 2024-11-09 RX ORDER — EZETIMIBE 10 MG/1
10 TABLET ORAL
Qty: 30 TABLET | Refills: 0 | Status: SHIPPED | OUTPATIENT
Start: 2024-11-09

## 2024-11-09 RX ORDER — POTASSIUM CHLORIDE 1500 MG/1
20 TABLET, EXTENDED RELEASE ORAL EVERY OTHER DAY
Start: 2024-11-09 | End: 2024-11-09

## 2024-11-09 RX ORDER — METOPROLOL SUCCINATE 25 MG/1
12.5 TABLET, EXTENDED RELEASE ORAL 2 TIMES DAILY
Qty: 60 TABLET | Refills: 0 | Status: SHIPPED | OUTPATIENT
Start: 2024-11-09

## 2024-11-09 RX ADMIN — LAMOTRIGINE 100 MG: 100 TABLET ORAL at 09:07

## 2024-11-09 RX ADMIN — METOPROLOL SUCCINATE 12.5 MG: 25 TABLET, EXTENDED RELEASE ORAL at 09:08

## 2024-11-09 RX ADMIN — EMPAGLIFLOZIN 10 MG: 10 TABLET, FILM COATED ORAL at 09:07

## 2024-11-09 RX ADMIN — MAGNESIUM OXIDE TAB 400 MG (241.3 MG ELEMENTAL MG) 400 MG: 400 (241.3 MG) TAB at 09:08

## 2024-11-09 RX ADMIN — PANTOPRAZOLE SODIUM 20 MG: 20 TABLET, DELAYED RELEASE ORAL at 05:40

## 2024-11-09 RX ADMIN — HEPARIN SODIUM 5000 UNITS: 5000 INJECTION, SOLUTION INTRAVENOUS; SUBCUTANEOUS at 05:40

## 2024-11-09 RX ADMIN — LEVETIRACETAM 500 MG: 500 TABLET, FILM COATED ORAL at 09:07

## 2024-11-09 RX ADMIN — INSULIN GLARGINE 5 UNITS: 100 INJECTION, SOLUTION SUBCUTANEOUS at 09:07

## 2024-11-09 RX ADMIN — MIDODRINE HYDROCHLORIDE 10 MG: 5 TABLET ORAL at 09:08

## 2024-11-09 RX ADMIN — THIAMINE HCL TAB 100 MG 100 MG: 100 TAB at 09:07

## 2024-11-09 RX ADMIN — Medication 250 MG: at 09:07

## 2024-11-09 RX ADMIN — SPIRONOLACTONE 50 MG: 50 TABLET, FILM COATED ORAL at 09:07

## 2024-11-09 RX ADMIN — INSULIN LISPRO 2 UNITS: 100 INJECTION, SOLUTION INTRAVENOUS; SUBCUTANEOUS at 11:18

## 2024-11-09 RX ADMIN — FERROUS SULFATE TAB 325 MG (65 MG ELEMENTAL FE) 325 MG: 325 (65 FE) TAB at 09:07

## 2024-11-09 RX ADMIN — ASPIRIN 81 MG: 81 TABLET, COATED ORAL at 09:07

## 2024-11-09 RX ADMIN — BUSPIRONE HYDROCHLORIDE 30 MG: 10 TABLET ORAL at 09:07

## 2024-11-09 RX ADMIN — Medication 1 TABLET: at 09:08

## 2024-11-09 NOTE — ASSESSMENT & PLAN NOTE
Admitted [facility] with acute on chronic CHF  echocardiogram showed drop in EF to 25%, 40 to 45% prior  Transferred to [facility] for ischemic workup  Cardiac cath 11/7- moderate mid LAD disease   Cards recommending GDMT   C/w ASA, zetia and jardiance  Cardiac rehab at discharge - [facility]  Continue with furosemide 20mg every other day  Declined life vest  Echo in 3 months   Monitor intake/output, daily weights

## 2024-11-09 NOTE — DISCHARGE SUMMARY
Discharge Summary - Hospitalist   Name: Ryan Farnsworth 54 y.o. male I MRN: [id number]  Unit/Bed#: PPHP-327-01 I Date of Admission: 11/6/2024   Date of Service: 11/9/2024 I Hospital Day: 3     Assessment & Plan  Cardiomyopathy (HCC)  Admitted [facility] with acute on chronic CHF  echocardiogram showed drop in EF to 25%, 40 to 45% prior  Transferred to [facility] for ischemic workup  Cardiac cath 11/7- moderate mid LAD disease   Cards recommending GDMT   C/w ASA, zetia and jardiance  Cardiac rehab at discharge - [facility]  Continue with furosemide 20mg every other day  Declined life vest  Echo in 3 months   Monitor intake/output, daily weights    Acute on chronic systolic heart failure (HCC)  Initially admitted to the [facility] with a/c systolic CHF, as evidenced by lower extremity edema and shortness of breath. Status post IV diuresis and transitioned to PO lasix. Transferred to [facility] for ischemic workup given drop in EF on repeat echocardiogram  Change to PO furosemide 20 mg every other day  ECHO 11/5/24- EF 25%, systolic function severely reduced, severe global hypokinesis with regional variation. Diastolic function is abnormal. Right ventricular cavity size is mildly dilated, systolic function is moderately reduced. The left and right atrium mildly dilated. AV mild regurgitation. MV/TV mild to moderate regurgitation.  Daily weights/Strict I & Os  LHC showing nonischemic nature   Per cards recommending echo at 3 months of GDMT   Pt declined lifevest   GDMT: Has had difficulties in the past tolerating goal-directed medical therapy secondary to orthostatic hypotension  Continue spironolactone 50 mg daily  Continue metoprolol 12.5 mg twice daily  Started on Jardiance 10mg daily   Type 2 diabetes mellitus with diabetic polyneuropathy, without long-term current use of insulin ([facility])  Hemoglobin A1c shows good control at 6.6%  Continue Lantus 5 units every morning at home and  sliding scale coverage with meals.   Consistent carb diet    Seizure disorder (HCC)  Last seizure 2007   Continue on home Keppra and Lamictal  Orthostatic hypotension  Continue midodrine 10 mg twice daily  Stage 3a chronic kidney disease (HCC)  Lab Results   Component Value Date    CREATININE 1.54 (H) 11/09/2024    CREATININE 1.40 (H) 11/08/2024    CREATININE 1.39 (H) 11/07/2024   Renal function at baseline  Continue to monitor  Repeat BMP within a week of discharge.   Functional diarrhea  Chronic, noted after pancreas surgery   Improved with probiotic and imodium    Hypomagnesemia  Recheck magneisum levels within a week of discharge  Continue magnesium oxide 400 mg at home  Chronic alcohol abuse  Refrain from consumption of alcohol- cessation discussed and encouraged  PTSD (post-traumatic stress disorder)  C/w buspar and remeron   Mixed hyperlipidemia  Continue zetia 10mg daily.   Noted to not be able to tolerate statins   Coronary artery disease  Non-obstructive CAD   Noted to have 50% mid LAD lesion  cardiology medical management   C/w asa 81mg daily and zetia, unable to tolerate statin   Pleural effusion  Reassess as outpatient per cards  Currently on room air   C/w po lasix   Severe protein-calorie malnutrition (HCC)  Malnutrition Findings:                                 BMI Findings:           Body mass index is 17.77 kg/m².   Consulted w/ nutrition during hospital admission and provided education  Continue with PO intake at home with added protein shakes     Medical Problems       Resolved Problems  Date Reviewed: 11/8/2024   None       Discharging Physician / Practitioner: ZEFERINO Alegre  PCP: Guerrero Campa MD  Admission Date:   Admission Orders (From admission, onward)       Ordered        11/07/24 0828  INPATIENT ADMISSION  Once                          Discharge Date: 11/09/24    Consultations During Hospital Stay:  Cardiology/heart failure  Wound care    Procedures Performed:    Echocardiogram  Left heart catheterization/cardiac coronary angiogram    Significant Findings / Test Results:   Echo 11/5/2024: Left ventricular ejection fraction is 25 percent. Systolic function is severely reduced with severe global hypokinesis and abnormal diastolic function. Mild regurg of AV/MV and mild to moderate TV regurg.   [facility] 11/7/2024: No angiographic evidence of significant obstructive CAD beyond moderate mid LAD findings. LVEDP is normal without gradient on LV-AO pullback.   Magnesium as low as 1.1, that increased to normal (2.1) with repletion.   Incidental Findings:   N/A    Test Results Pending at Discharge (will require follow up):   N/A     Outpatient Tests Requested:  CBC, CMP, magnesium in 1 week - [facility]    Complications:  None    Reason for Admission: Cardiomyopathy    Hospital Course:   Ryan Farnsworth is a 54 y.o. male patient who originally presented to the [facility] with a/c systolic CHF, as evidenced by lower extremity edema and shortness of breath - treated with IV diuresis and transitioned to PO lasix. Transferred to [facility] 11/6/2024 for ischemic workup given drop in EF to 25% on repeat echocardiogram. Now s/p [facility] 11/7/2024 with no angiographic evidence of significant obstructive CAD beyond moderate mid LAD findings. Started on Jardiance, Spironolactone, Zetia, with uptitration of Toprol per heart failure team for GDMT. Now decreased lasix to 20mg every other day. Will follow up with [facility].        Please see above list of diagnoses and related plan for additional information.     Condition at Discharge: good    Discharge Day Visit / Exam:   Subjective:  Pt resting in recliner upon exam. NAD, denies fevers/weakness, reports improved swelling in b/l LE, denies CP/SOB, reports chronic 3/10 RLQ abdominal pain but reports diarrhea improving as well. No acute complaints - reports he is ready to go home.     Vitals: Blood Pressure: 117/81  "(11/09/24 0657)  Pulse: 72 (11/09/24 0657)  Temperature: 98 °F (36.7 °C) (11/09/24 0657)  Temp Source: Oral (11/07/24 1533)  Respirations: 16 (11/09/24 0205)  Height: 6' 1\" (185.4 cm) (11/06/24 2124)  Weight - Scale: 61.1 kg (134 lb 11.2 oz) (11/09/24 0547)  SpO2: 94 % (11/09/24 0800)  Physical Exam  Constitutional:       General: He is not in acute distress.     Comments: Chronically ill appearing, cachetic.    HENT:      Mouth/Throat:      Mouth: Mucous membranes are moist.      Pharynx: Oropharynx is clear.   Eyes:      Pupils: Pupils are equal, round, and reactive to light.   Cardiovascular:      Rate and Rhythm: Normal rate and regular rhythm.      Pulses: Normal pulses.      Heart sounds: Normal heart sounds.      Comments: B/l non pitting trace edema in LE.   Pulmonary:      Effort: Pulmonary effort is normal. No respiratory distress.      Breath sounds: Normal breath sounds. No wheezing or rales.   Abdominal:      General: Abdomen is flat. Bowel sounds are normal. There is no distension.      Palpations: Abdomen is soft.      Tenderness: There is no abdominal tenderness.   Musculoskeletal:         General: Normal range of motion.      Left lower leg: No edema.   Skin:     General: Skin is warm and dry.   Neurological:      General: No focal deficit present.      Mental Status: He is alert. Mental status is at baseline.   Psychiatric:         Mood and Affect: Mood normal.         Behavior: Behavior normal.         Discussion with Family: Patient declined call to .     Discharge instructions/Information to patient and family:   See after visit summary for information provided to patient and family.      Provisions for Follow-Up Care:  See after visit summary for information related to follow-up care and any pertinent home health orders.      Mobility at time of Discharge:   Basic Mobility Inpatient Raw Score: 19  JH-HLM Goal: 6: Walk 10 steps or more  JH-HLM Achieved: 7: Walk 25 feet or more  HLM " Goal achieved. Continue to encourage appropriate mobility.     Disposition:   Home with [facility] Services (Reminder: Complete face to face encounter) - [facility]    Planned Readmission: No    Discharge Medications:  See after visit summary for reconciled discharge medications provided to patient and/or family.      Administrative Statements   Discharge Statement:  I have spent a total time of 30 minutes in caring for this patient on the day of the visit/encounter. >30 minutes of time was spent on: Risks and benefits of tx options, Instructions for management, Patient and family education, Importance of tx compliance, Risk factor reductions, Impressions, Counseling / Coordination of care, Documenting in the medical record, Reviewing / ordering tests, medicine, procedures  , and Communicating with other healthcare professionals .    **Please Note: This note may have been constructed using a voice recognition system**

## 2024-11-09 NOTE — ASSESSMENT & PLAN NOTE
Lab Results   Component Value Date    CREATININE 1.54 (H) 11/09/2024    CREATININE 1.40 (H) 11/08/2024    CREATININE 1.39 (H) 11/07/2024   Renal function at baseline  Continue to monitor  Repeat BMP within a week of discharge.

## 2024-11-09 NOTE — ASSESSMENT & PLAN NOTE
Hemoglobin A1c shows good control at 6.6%  Continue Lantus 5 units every morning at home and sliding scale coverage with meals.   Consistent carb diet

## 2024-11-09 NOTE — RESTORATIVE TECHNICIAN NOTE
Restorative Technician Note      Patient Name: Ryan Farnsworth     Restorative Tech Visit Date: 11/09/24  Note Type: Mobility  Patient Position Upon Consult: Bedside chair  Activity Performed: Ambulated  Assistive Device: Other (Comment) (personal cane)  Patient Position at End of Consult: Other (comment) (in the BR)

## 2024-11-09 NOTE — PROGRESS NOTES
Heart Failure Cardiology - Inpatient Consult Follow up Note    Ryan Farnsworth  MRN: [id number]  Unit/Bed#: PPHP-327-01; Encounter: [id number]        Assessment / Plan:  54 year old M with history of diabetes, hyperlipidemia, CKD 3, seizure disorder, orthostatic hypotension on midodrine, and cardiomyopathy with EF 45% on echo in 2023.  He was recommended to have a stress test to evaluate the mild LV dysfunction in 2023, but this was never performed.  Per notes the patient is mostly homebound and [facility]  He presented on November 4th to [facility] with shortness of breath, edema, weight gain.  He was found to be in acute decompensated heart failure and given IV diuresis.  Echo was performed demonstrating drop in EF to 25%.  He was transferred to [facility] for [facility].     # HFrEF - Acute on chronic     ETIOLOGY:  - previous EF 45% in 2023  -->  25% this admission  - non-ischemic by LH  - of note, hx of ETOH abuse which can cause cardiomyopathy  - of note, has LBBB which can cause cardiomyopathy  - ultimately can consider cardiac MRI as outpt    VOLUME:  - home diuretic:   lasix 20mg PRN  - inpatient diuretic:   s/p IV lasix at [facility] and transitioned to oral lasix 20 mg daily.  Weight trending down in setting of adding jardiance.  Rec lower lasix to 20mg QOD.    GDMT:       (Felt limited by orthostatic hypotension on midodrine)  - BB:  toprol xl 12.5 BID  - ACE/ARB/ARNI:   only if able to wean midodrine  - MRA:  spironolactone 50mg daily  - SGLT2i:   started jardiance    DEVICE:  - has LBBB  - Echo at 3 months of GDMT to make decision re CRT-D  - declines lifevest at this time     # Pleural effusion  - reassess as outpatient     # Non-obstructive CAD  - 50% mid LAD lesion, med mgt  - ASA 81mg daily  - on zetia.   cannot tolerate statin, last LDL 61     # HLD  - statin myopathy in the past, cannot tolerate (? rhabdo)  - on fibrate at home for high  TG's  - added zetia to optimize LDL     # chronic low magnesium  - finally normalized.  Continue magnesium oxide     # CKD 3  - baseline Cr 1.3-1.5  - today's Cr  1.4 -->  1.5  - rec outpatient labs shortly after discharge.  [facility]     # DM2  - poorly controlled in the past.   Recently A1c improved to 6.6     # seizure disorder  - on keppra and lamictal     # hx ETOH abuse  - reports used to drink from 11am - 2am  - still drinking but has cut back to twice per week  - recommend cessation, discussed again today     # severe protein calorie malnutrition  - BMI 18.9  - albumin low  - rec protein shakes     # PTSD  # Depression  - buspar, remeron        Today's Plan Summary:  - s/p IV lasix at [facility] and transitioned to oral lasix 20 mg daily.  Weight trending down in setting of adding jardiance.  Rec lower lasix to 20mg QOD.    - Okay for discharge from the heart failure perspective.    - [facility] the [facility] about [facility] this patient.  They are agreeable to cardiology follow-up at [facility]'s facility closer to his home but will not allow us to schedule the appointment.  The [facility] is aware he will be discharged this weekend and they will schedule a follow-up visit and transportation for him.                      Interval Events:  No overnight events.  Ins and outs not well recorded.  Hemodynamically stable  Sinus rhythm on telemetry    Subjective:  Reports feeling well and would like to go home today.  No fever or chills  No chest pain or shortness of breath  No palpitations, presyncope or syncope.    Objective:  Vitals:        reviewed all vitals past 24 hours.  See epic.  I+O's:         no output recorded.  Weights:    down 5 lbs, does not clinically appear accurate  Telemetry:  sinus. No events.    Cardiac Imaging personally reviewed:  [facility] 11-7-24  50% mid LAD.     EKG 11/4/2024  Sinus rhythm with first-degree AV block  wide left bundle branch block     Echo 11/5/2024  LVIDD 5.9  cm.  EF 25%.  RV mild dilation with moderate dysfunction  Mild AI  Mild to moderate MR  Mild to moderate TR  RVSP 37  IVC 1.8 cm     Echo 4/2023.  EF 45%      Patient Active Problem List    Diagnosis Date Noted    Pleural effusion 11/08/2024    Severe protein-calorie malnutrition (HCC) 11/08/2024    Functional diarrhea 11/07/2024    Hypomagnesemia 11/07/2024    Cardiomyopathy (HCC) 11/06/2024    H/O splenectomy 11/04/2024    Obstructive sleep apnea syndrome 11/04/2024    Acute on chronic systolic heart failure (HCC) 11/04/2024    Stage 3a chronic kidney disease (HCC) 11/04/2024    GERD (gastroesophageal reflux disease) 07/03/2023    Alcohol intake above recommended sensible limits 02/08/2023    Coronary artery disease 02/08/2023    Mixed hyperlipidemia 11/01/2022    Orthostatic hypotension 11/01/2022    Diabetic ulcer of toe of right foot associated with type 2 diabetes mellitus ([facility]) 06/27/2020    Seizure disorder ([facility]) 06/27/2020    PTSD (post-traumatic stress disorder) 06/27/2020    Gastrointestinal hemorrhage with hematemesis 03/26/2019    Alcoholic ketoacidosis 03/25/2019    Type 2 diabetes mellitus with diabetic polyneuropathy, without long-term current use of insulin ([facility]) 03/25/2019    Chronic alcohol abuse 03/25/2019    SUSIE (acute kidney injury) ([facility]) 03/25/2019    Anxiety 02/25/2017    Depression 02/25/2017    History of seizure 02/25/2017    Iron deficiency anemia 02/25/2017       Past Medical History:   Diagnosis Date    Alcoholic hepatitis     Anxiety     Chronic liver disease     Depression     Diabetes ([facility])     Diabetes mellitus ([facility])     Diabetic ulcer of toe of right foot associated with type 2 diabetes mellitus ([facility]) 06/27/2020    Diabetic ulcer of toe of right foot associated with type 2 diabetes mellitus, with fat layer exposed ([facility]) 06/27/2020    Hypertension     Iron deficiency anemia     Lactic acidosis 06/27/2020    Pancreatitis     PTSD (post-traumatic stress disorder)     Seizure ([facility])      Seizure disorder ([facility]) 06/27/2020    Seizures ([facility])     Sepsis, unspecified organism ([facility]) 06/27/2020    Type 2 diabetes mellitus with diabetic polyneuropathy, without long-term current use of insulin ([facility]) 03/25/2019       Allergies   Allergen Reactions    Statins Other (See Comments)     Lost muscles         Current Facility-Administered Medications:     acetaminophen (TYLENOL) tablet 650 mg, 650 mg, Oral, Q6H PRN, Cristina Reisek, CRNP    busPIRone (BUSPAR) tablet 30 mg, 30 mg, Oral, BID, Cristina Grecsek, CRNP, 30 mg at 11/08/24 2130    Empagliflozin (JARDIANCE) tablet 10 mg, 10 mg, Oral, Daily, CHARLETTE CarmenNP, 10 mg at 11/08/24 1555    ezetimibe (ZETIA) tablet 10 mg, 10 mg, Oral, HS, Cristina Soliman, CRNP, 10 mg at 11/08/24 2129    ferrous sulfate tablet 325 mg, 325 mg, Oral, Daily With Breakfast, Cristina Soliman, CRNP, 325 mg at 11/08/24 0909    furosemide (LASIX) tablet 20 mg, 20 mg, Oral, Daily, Rissa Mishel Rock CRNP, 20 mg at 11/08/24 0900    heparin (porcine) subcutaneous injection 5,000 Units, 5,000 Units, Subcutaneous, Q8H HERBERTH, Cristina Soliman CRNP, 5,000 Units at 11/09/24 0540    HYDROcodone-acetaminophen (NORCO) 5-325 mg per tablet 1 tablet, 1 tablet, Oral, Q12H PRN, Cristina Soliman, CRNP    insulin glargine (LANTUS) subcutaneous injection 5 Units 0.05 mL, 5 Units, Subcutaneous, QAM, Cristina Soliman, CRNP, 5 Units at 11/08/24 0907    insulin lispro (HumALOG/ADMELOG) 100 units/mL subcutaneous injection 1-6 Units, 1-6 Units, Subcutaneous, 4x Daily (with meals and at bedtime), 2 Units at 11/08/24 2130 **AND** Fingerstick Glucose (POCT), , , 4x Daily AC and at bedtime, ZEFERINO Grimm    lamoTRIgine (LaMICtal) tablet 100 mg, 100 mg, Oral, Daily, ZEFERINO Grimm, 100 mg at 11/08/24 0900    levETIRAcetam (KEPPRA) tablet 500 mg, 500 mg, Oral, Q12H HERBERTH, ZEFERINO Grimm, 500 mg at 11/08/24 2130    loperamide (IMODIUM) capsule 2 mg, 2 mg, Oral, TID PRN, ZEFERINO Grimm     magnesium Oxide (MAG-OX) tablet 400 mg, 400 mg, Oral, BID, Rissa Mishel Pranav, CRNP, 400 mg at 11/08/24 1747    melatonin tablet 6 mg, 6 mg, Oral, HS, Cristina Grecsek, CRNP, 6 mg at 11/08/24 2129    metoprolol succinate (TOPROL-XL) 24 hr tablet 12.5 mg, 12.5 mg, Oral, BID, Rissa Mishel Pranav, CRNP, 12.5 mg at 11/08/24 0900    midodrine (PROAMATINE) tablet 10 mg, 10 mg, Oral, BID, Cristina Grecsek, CRNP, 10 mg at 11/08/24 2130    mirtazapine (REMERON) tablet 30 mg, 30 mg, Oral, HS, Cristina Grecsek, CRNP, 30 mg at 11/08/24 2130    multivitamin-minerals (CENTRUM) tablet 1 tablet, 1 tablet, Oral, Daily, Cristina Grecsek, CRNP, 1 tablet at 11/08/24 0859    pantoprazole (PROTONIX) EC tablet 20 mg, 20 mg, Oral, Early Morning, Cristina Grecsek, CRNP, 20 mg at 11/09/24 0540    saccharomyces boulardii (FLORASTOR) capsule 250 mg, 250 mg, Oral, BID, Cristina Grecsek, CRNP, 250 mg at 11/08/24 1747    spironolactone (ALDACTONE) tablet 50 mg, 50 mg, Oral, Daily, Cristina Grecsek, CRNP, 50 mg at 11/08/24 0859    thiamine tablet 100 mg, 100 mg, Oral, Daily, Cristina Grecsek, CRNP, 100 mg at 11/08/24 0859    Social History     Socioeconomic History    Marital status:      Spouse name: Not on file    Number of children: Not on file    Years of education: Not on file    Highest education level: Not on file   Occupational History    Not on file   Tobacco Use    Smoking status: Never    Smokeless tobacco: Never   Vaping Use    Vaping status: Never Used   Substance and Sexual Activity    Alcohol use: Yes     Alcohol/week: 15.0 standard drinks of alcohol     Types: 15 Cans of beer per week     Comment: 15 cans beer daily    Drug use: Never    Sexual activity: Not on file   Other Topics Concern    Not on file   Social History Narrative    Not on file     Social Determinants of Health     Financial Resource Strain: Not on file   Food Insecurity: No Food Insecurity (11/7/2024)    Hunger Vital Sign     Worried About Running Out of Food in  "the Last Year: Never true     Ran Out of Food in the Last Year: Never true   Transportation Needs: No Transportation Needs (2024)    PRAPARE - Transportation     Lack of Transportation (Medical): No     Lack of Transportation (Non-Medical): No   Physical Activity: Not on file   Stress: Not on file   Social Connections: Unknown (2024)    Received from [employer]     How often do you feel lonely or isolated from those around you? (Adult - for ages 18 years and over): Not on file   Intimate Partner Violence: At Risk (2024)    Nursing IPS     Feels Physically and Emotionally Safe: Not on file     Physically Hurt by Someone: Not on file     Humiliated or Emotionally Abused by Someone: Not on file     Physically Hurt by Someone: 1     Hurt or Threatened by Someone: 1   Housing Stability: Low Risk  (2024)    Housing Stability Vital Sign     Unable to Pay for Housing in the Last Year: No     Number of Times Moved in the Last Year: 0     Homeless in the Last Year: No   Recent Concern: Housing Stability - At Risk (2024)    Nursing: Inadequate Housing Risk Classification     Has Housing: Not on file     Worried About Losing Housing: Not on file     Unable to Get Utilities: Not on file     Unable to Pay for Housing in the Last Year: 1     Has Housin       History reviewed. No pertinent family history.    Physical Exam:  Blood pressure 117/81, pulse 72, temperature 98 °F (36.7 °C), resp. rate 16, height 6' 1\" (1.854 m), weight 61.1 kg (134 lb 11.2 oz), SpO2 94%.  Body mass index is 17.77 kg/m².  Wt Readings from Last 3 Encounters:   24 61.1 kg (134 lb 11.2 oz)   24 66.8 kg (147 lb 4.3 oz)   23 74.4 kg (164 lb)     General: Chronically ill-appearing but in no acute distress  Neck: Supple, JVP is not elevated at all.  Cardiovascular: Distant, regular, no murmur rub or gallop  Lung: Clear with the exception of decreased breath sounds at the right base.  No " wheezes.  Abdomen: Soft nontender nondistended  Extremities: Warm with trace to mild pitting edema distally.  There is some scabbing of prior wounds that have healed.    Labs & Results:      Results from last 7 days   Lab Units 11/09/24 0445 11/08/24 0419 11/07/24 0448   WBC Thousand/uL 5.72 5.73 6.30   HEMOGLOBIN g/dL 16.3 15.3 14.5   HEMATOCRIT % 48.4 46.9 45.4   PLATELETS Thousands/uL 264 268 229         Results from last 7 days   Lab Units 11/09/24 0445 11/08/24 0419 11/07/24 0448 11/05/24  0528 11/04/24  1704   POTASSIUM mmol/L 4.6 4.2 4.6   < > 4.5   CHLORIDE mmol/L 100 101 105   < > 110*   CO2 mmol/L 31 28 26   < > 24   BUN mg/dL 28* 26* 27*   < > 23   CREATININE mg/dL 1.54* 1.40* 1.39*   < > 1.41*   CALCIUM mg/dL 9.9 10.0 9.6   < > 8.8   ALK PHOS U/L  --   --   --   --  350*   ALT U/L  --   --   --   --  24   AST U/L  --   --   --   --  38    < > = values in this interval not displayed.               ________________________________________    Ryan Eller MD, [facility]  Advanced Heart Failure and Transplant Cardiologist  Director of Cardio-Oncology  [facility]

## 2024-11-09 NOTE — ASSESSMENT & PLAN NOTE
Malnutrition Findings:                                 BMI Findings:           Body mass index is 17.77 kg/m².   Consulted w/ nutrition during hospital admission and provided education  Continue with PO intake at home with added protein shakes

## 2024-11-09 NOTE — ASSESSMENT & PLAN NOTE
Initially admitted to the [facility] with a/c systolic CHF, as evidenced by lower extremity edema and shortness of breath. Status post IV diuresis and transitioned to PO lasix. [facility] ischemic workup given drop in EF on repeat echocardiogram  Change to PO furosemide 20 mg every other day  ECHO 11/5/24- EF 25%, systolic function severely reduced, severe global hypokinesis with regional variation. Diastolic function is abnormal. Right ventricular cavity size is mildly dilated, systolic function is moderately reduced. The left and right atrium mildly dilated. AV mild regurgitation. MV/TV mild to moderate regurgitation.  Daily weights/Strict I & Os  LHC showing nonischemic nature   Per cards recommending echo at 3 months of GDMT   Pt declined lifevest   GDMT: Has had difficulties in the past tolerating goal-directed medical therapy secondary to orthostatic hypotension  Continue spironolactone 50 mg daily  Continue metoprolol 12.5 mg twice daily  Started on Jardiance 10mg daily

## 2024-11-09 NOTE — DISCHARGE INSTR - AVS FIRST PAGE
Dear Ryan Farnsworth,     It was our pleasure to care for you here at [facility].  It is our hope that we were always able to meet and exceed the expected standards for your care during your stay.  You were hospitalized due to Congestive Heart Failure .  You were cared for on the 3rd floor under the service of ZEFERINO Alegre with the [facility] who covers for your primary care physician (PCP), Guerrero Campa MD, while you were hospitalized.  If you have any questions or concerns related to this hospitalization, you may contact us at .  For follow up, we recommend that you follow up with your primary care physician.  Please review this entire discharge summary as additional general instructions may be provided later as well.  However, at this time we provide for you here, the most important instructions / recommendations at discharge:     Limit your total sodium intake each day to less than 2000 mg (2 grams) per day maximum.  It is very important to read food labels as there is hidden sodium in various types of foods.  Also, do not add salt to your food.  Sodium / salt causes fluid retention and can cause your heart failure to act up again.  Limit total fluid intake.  This includes all fluids consumed.  Take your diuretic (water pill) and other prescribed heart medications as recommended.  Follow up with your heart doctor within 1 week- this will be [facility] by the [facility] after [facility]   Recommend cardiac rehab at discharge- once again this should be arranged with the [facility]   Weigh yourself daily on the same scale with the same amount of clothing each day.  If you have a weight gain > 3 lbs in a day or 5 lbs in a week, please contact the the [facility] cardiology team for further instructions especially if weight gain is associated with increased shortness of breath or leg swelling.  Check blood work through the [facility] in one week. You should check a BMP and a Mag  level to monitor kidney function and electrolytes.     Sincerely,     ZEFERINO Alegre

## 2024-11-09 NOTE — RESTORATIVE TECHNICIAN NOTE
Restorative Technician Note      Patient Name: Ryan Farnsworth     Restorative Tech Visit Date: 11/09/24  Note Type: Mobility  Patient Position Upon Consult: Other (Comment) (in the BR)  Activity Performed: Ambulated  Assistive Device: Cane; Other (Comment) (personal)  Patient Position at End of Consult: Bed/Chair alarm activated; All needs within reach; Bedside chair

## 2024-11-11 ENCOUNTER — PATIENT OUTREACH (OUTPATIENT)
Dept: CASE MANAGEMENT | Facility: OTHER | Age: 54
End: 2024-11-11

## 2024-11-11 NOTE — PROGRESS NOTES
Outpatient Care Manager Note: Received ADT alert patient discharged to home with [facility] to resume. Chart notes reviewed and call made to patient and left message. Will follow up.

## 2024-11-12 ENCOUNTER — PATIENT OUTREACH (OUTPATIENT)
Dept: CASE MANAGEMENT | Facility: OTHER | Age: 54
End: 2024-11-12

## 2024-11-12 NOTE — PROGRESS NOTES
Outpatient Care Manager Note: Call made to patient and left message. Will attempt again this week.

## 2024-11-15 ENCOUNTER — PATIENT OUTREACH (OUTPATIENT)
Dept: CASE MANAGEMENT | Facility: OTHER | Age: 54
End: 2024-11-15

## 2024-11-15 NOTE — LETTER
Marcos Davis  My name is Yahir Garrison.  I am a registered nurse care manager working with [facility] Outpatient [facility]. I met with you over the TV when you were admitted at [place]  My work is to help patients that have been in the hospital with a primary diagnosis of heart failure help manage their condition and get the care they need.    I have been unable to reach you.  Please call me with any questions you may have or if you need assistance in managing your heart failure.  Sincerely,  Yahir Garrison MS, RN,CNL  [phone]  RN Outpatient Care Manager

## 2024-11-15 NOTE — PROGRESS NOTES
Outpatient Care Manager Note: Call made to patient and left message. Will mail unable to reach letter.

## 2024-11-22 ENCOUNTER — PATIENT OUTREACH (OUTPATIENT)
Dept: CASE MANAGEMENT | Facility: OTHER | Age: 54
End: 2024-11-22

## 2024-11-22 NOTE — PROGRESS NOTES
Outpatient Care Manager Note: Chart reviewed and no new notes. Will continue follow chart for remainder of 30 day complex episode.

## 2024-11-29 ENCOUNTER — PATIENT OUTREACH (OUTPATIENT)
Dept: CASE MANAGEMENT | Facility: OTHER | Age: 54
End: 2024-11-29

## 2024-11-29 NOTE — PROGRESS NOTES
Outpatient Care Manager Note: Chart notes reviewed. Will continue to monitor to chart noes for remainder of 30 day complex episode.

## 2024-12-06 ENCOUNTER — PATIENT OUTREACH (OUTPATIENT)
Dept: CASE MANAGEMENT | Facility: OTHER | Age: 54
End: 2024-12-06

## 2024-12-06 NOTE — PROGRESS NOTES
Outpatient Care Manager Note: Chart notes reviewed and there is documentation from [facility] of PCP visit. No admissions or ED visits. Will resolve CHF/complex episode and remove self from care team.

## 2025-01-06 ENCOUNTER — TELEPHONE (OUTPATIENT)
Age: 55
End: 2025-01-06

## 2025-01-06 NOTE — TELEPHONE ENCOUNTER
Caller: Ryan     Doctor: N/A    Reason for call: patient had a left cath done on 11/7/24 and stated that he never scheduled a f/u appt due to [facility]. [facility] was supposed to fax over a referral stating patient will f/u on care. He stated that he requested referral goes to [place] which would be the closest for him. Offered to schedule something in the mean time but preferred to wait. Please advise and contact patient if it was or was not received.     Call back#: [phone]

## 2025-01-09 ENCOUNTER — OFFICE VISIT (OUTPATIENT)
Dept: CARDIOLOGY CLINIC | Facility: HOSPITAL | Age: 55
End: 2025-01-09
Payer: COMMERCIAL

## 2025-01-09 VITALS
SYSTOLIC BLOOD PRESSURE: 120 MMHG | DIASTOLIC BLOOD PRESSURE: 78 MMHG | WEIGHT: 135.6 LBS | BODY MASS INDEX: 17.97 KG/M2 | HEART RATE: 86 BPM | HEIGHT: 73 IN | OXYGEN SATURATION: 96 %

## 2025-01-09 DIAGNOSIS — I95.1 ORTHOSTATIC HYPOTENSION: ICD-10-CM

## 2025-01-09 DIAGNOSIS — E11.42 TYPE 2 DIABETES MELLITUS WITH DIABETIC POLYNEUROPATHY, WITHOUT LONG-TERM CURRENT USE OF INSULIN (HCC): Chronic | ICD-10-CM

## 2025-01-09 DIAGNOSIS — I42.9 CARDIOMYOPATHY, UNSPECIFIED TYPE (HCC): Primary | ICD-10-CM

## 2025-01-09 DIAGNOSIS — N18.31 STAGE 3A CHRONIC KIDNEY DISEASE (HCC): ICD-10-CM

## 2025-01-09 PROCEDURE — 99214 OFFICE O/P EST MOD 30 MIN: CPT | Performed by: INTERNAL MEDICINE
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
## 2025-01-30 ENCOUNTER — HOSPITAL ENCOUNTER (OUTPATIENT)
Dept: NON INVASIVE DIAGNOSTICS | Facility: HOSPITAL | Age: 55
Discharge: HOME | End: 2025-01-30
Attending: INTERNAL MEDICINE
Payer: COMMERCIAL

## 2025-01-30 ENCOUNTER — RESULTS FOLLOW-UP (OUTPATIENT)
Dept: CARDIOLOGY CLINIC | Facility: CLINIC | Age: 55
End: 2025-01-30

## 2025-01-30 VITALS
BODY MASS INDEX: 17.97 KG/M2 | SYSTOLIC BLOOD PRESSURE: 126 MMHG | HEART RATE: 89 BPM | DIASTOLIC BLOOD PRESSURE: 102 MMHG | WEIGHT: 135.58 LBS | HEIGHT: 73 IN

## 2025-01-30 DIAGNOSIS — I42.9 CARDIOMYOPATHY, UNSPECIFIED TYPE (HCC): ICD-10-CM

## 2025-01-30 LAB
AORTIC ROOT: 4.1 CM
ASCENDING AORTA: 3.6 CM
AV LVOT MEAN GRADIENT: 1 MMHG
AV LVOT PEAK GRADIENT: 2 MMHG
AV REGURGITATION PRESSURE HALF TIME: 474 MS
BSA FOR ECHO PROCEDURE: 1.82 M2
DOP CALC LVOT AREA: 3.14 CM2
DOP CALC LVOT CARDIAC INDEX: 1.56 L/MIN/M2
DOP CALC LVOT CARDIAC OUTPUT: 2.85 L/MIN
DOP CALC LVOT DIAMETER: 2 CM
DOP CALC LVOT PEAK VEL VTI: 9.56 CM
DOP CALC LVOT PEAK VEL: 0.69 M/S
DOP CALC LVOT STROKE INDEX: 16.5 ML/M2
DOP CALC LVOT STROKE VOLUME: 30
E WAVE DECELERATION TIME: 143 MS
FRACTIONAL SHORTENING: 11 (ref 28–44)
INTERVENTRICULAR SEPTUM IN DIASTOLE (PARASTERNAL SHORT AXIS VIEW): 1.1 CM
INTERVENTRICULAR SEPTUM: 1.1 CM (ref 0.6–1.1)
LAAS-AP2: 29.8 CM2
LAAS-AP4: 29.3 CM2
LEFT ATRIUM SIZE: 3.9 CM
LEFT ATRIUM VOLUME (MOD BIPLANE): 122 ML
LEFT ATRIUM VOLUME INDEX (MOD BIPLANE): 67 ML/M2
LEFT INTERNAL DIMENSION IN SYSTOLE: 5.6 CM (ref 2.1–4)
LEFT VENTRICULAR INTERNAL DIMENSION IN DIASTOLE: 6.3 CM (ref 3.5–6)
LEFT VENTRICULAR POSTERIOR WALL IN END DIASTOLE: 0.9 CM
LEFT VENTRICULAR STROKE VOLUME: 47 ML
LVSV (TEICH): 47 ML
MV E'TISSUE VEL-LAT: 11 CM/S
MV E'TISSUE VEL-SEP: 4 CM/S
MV PEAK E VEL: 98 CM/S
MV STENOSIS PRESSURE HALF TIME: 42 MS
MV VALVE AREA P 1/2 METHOD: 5.2
RIGHT VENTRICLE ID DIMENSION: 3.6 CM
SL CV AV DECELERATION TIME RETROGRADE: 1636 MS
SL CV AV PEAK GRADIENT RETROGRADE: 30 MMHG
SL CV LEFT ATRIUM LENGTH A2C: 6.5 CM
SL CV LV EF: 25
SL CV PED ECHO LEFT VENTRICLE DIASTOLIC VOLUME (MOD BIPLANE) 2D: 202 ML
SL CV PED ECHO LEFT VENTRICLE SYSTOLIC VOLUME (MOD BIPLANE) 2D: 155 ML
TRICUSPID ANNULAR PLANE SYSTOLIC EXCURSION: 2.7 CM
TRICUSPID VALVE PEAK E WAVE VELOCITY: 0.22 M/S

## 2025-01-30 PROCEDURE — 93306 TTE W/DOPPLER COMPLETE: CPT

## 2025-01-30 PROCEDURE — 93306 TTE W/DOPPLER COMPLETE: CPT | Performed by: INTERNAL MEDICINE

## 2025-01-30 NOTE — TELEPHONE ENCOUNTER
----- Message from Benjamin Perez MD sent at 1/30/2025  3:07 PM EST -----   please let him know that heart muscle function looks about the same as before, no improvement at this time, please have him send a list of his blood pressures from home   we might be able to make some changes and and try to get him off the midodrine to add other medications that might help his heart.

## 2025-07-02 ENCOUNTER — TELEPHONE (OUTPATIENT)
Dept: CARDIOLOGY CLINIC | Facility: HOSPITAL | Age: 55
End: 2025-07-02

## 2025-07-02 NOTE — TELEPHONE ENCOUNTER
7/2/25 @ 8:43am [facility] for [facility] to call [facility] to get referral in time for upcoming appt on 7/15/25 with Dr. Perez otherwise the appt would need rescheduled if we can not get this referral in time. If pt calls back get referral #, Auth # from [facility] referral and keep appt or reschedule depending on if referral is gotten in time.        [facility] Referral NEEDED  Received: Today  Carolyn Randolph sent to  Cardiology [facility]  Hello,    Patient is scheduled 7/15/2025 and has [facility]. A referral is required and not on file. Patient has to contact [facility] to request a cardiology referral as soon as possible to allow time for processing.    Please notify patient and document. Thank you.    Network Prior Authorization Team

## (undated) DEVICE — CATH GUIDE LAUNCHER 6FR EBU 3.5

## (undated) DEVICE — GLIDESHEATH BASIC HYDROPHILIC COATED INTRODUCER SHEATH: Brand: GLIDESHEATH

## (undated) DEVICE — GUIDEWIRE WHOLEY HI TORQUE INTERM MOD J .035 145CM

## (undated) DEVICE — RADIFOCUS OPTITORQUE ANGIOGRAPHIC CATHETER: Brand: OPTITORQUE

## (undated) DEVICE — TR BAND RADIAL ARTERY COMPRESSION DEVICE: Brand: TR BAND

## (undated) DEVICE — DGW .035 FC J3MM 260CM TEF: Brand: EMERALD